# Patient Record
Sex: FEMALE | Race: WHITE | NOT HISPANIC OR LATINO | Employment: UNEMPLOYED | URBAN - METROPOLITAN AREA
[De-identification: names, ages, dates, MRNs, and addresses within clinical notes are randomized per-mention and may not be internally consistent; named-entity substitution may affect disease eponyms.]

---

## 2017-01-19 ENCOUNTER — GENERIC CONVERSION - ENCOUNTER (OUTPATIENT)
Dept: OTHER | Facility: OTHER | Age: 57
End: 2017-01-19

## 2017-02-03 ENCOUNTER — GENERIC CONVERSION - ENCOUNTER (OUTPATIENT)
Dept: OTHER | Facility: OTHER | Age: 57
End: 2017-02-03

## 2017-02-13 ENCOUNTER — GENERIC CONVERSION - ENCOUNTER (OUTPATIENT)
Dept: OTHER | Facility: OTHER | Age: 57
End: 2017-02-13

## 2017-03-05 ENCOUNTER — GENERIC CONVERSION - ENCOUNTER (OUTPATIENT)
Dept: OTHER | Facility: OTHER | Age: 57
End: 2017-03-05

## 2017-03-08 ENCOUNTER — GENERIC CONVERSION - ENCOUNTER (OUTPATIENT)
Dept: OTHER | Facility: OTHER | Age: 57
End: 2017-03-08

## 2017-04-19 ENCOUNTER — ALLSCRIPTS OFFICE VISIT (OUTPATIENT)
Dept: OTHER | Facility: OTHER | Age: 57
End: 2017-04-19

## 2017-04-19 DIAGNOSIS — Z12.31 ENCOUNTER FOR SCREENING MAMMOGRAM FOR MALIGNANT NEOPLASM OF BREAST: ICD-10-CM

## 2017-05-18 ENCOUNTER — GENERIC CONVERSION - ENCOUNTER (OUTPATIENT)
Dept: OTHER | Facility: OTHER | Age: 57
End: 2017-05-18

## 2017-09-05 ENCOUNTER — GENERIC CONVERSION - ENCOUNTER (OUTPATIENT)
Dept: OTHER | Facility: OTHER | Age: 57
End: 2017-09-05

## 2017-09-14 ENCOUNTER — GENERIC CONVERSION - ENCOUNTER (OUTPATIENT)
Dept: OTHER | Facility: OTHER | Age: 57
End: 2017-09-14

## 2017-09-20 ENCOUNTER — GENERIC CONVERSION - ENCOUNTER (OUTPATIENT)
Dept: OTHER | Facility: OTHER | Age: 57
End: 2017-09-20

## 2017-10-27 ENCOUNTER — ALLSCRIPTS OFFICE VISIT (OUTPATIENT)
Dept: OTHER | Facility: OTHER | Age: 57
End: 2017-10-27

## 2017-10-28 NOTE — PROGRESS NOTES
Assessment  1  Frequent headaches (784 0) (R51)    Plan  Frequent headaches    · Amoxicillin-Pot Clavulanate 875-125 MG Oral Tablet; TAKE 1 TABLET EVERY 12  HOURS DAILY    Discussion/Summary    Possible frontal sinusitis to call if not better after treatmentto the office as needed  Possible side effects of new medications were reviewed with the patient/guardian today  The treatment plan was reviewed with the patient/guardian  The patient/guardian understands and agrees with the treatment plan      Chief Complaint  Patient presents for c/o headache x 2 weeks and eye discomfort  nil/lpn      History of Present Illness  HPI: 55years old female seen for a frontal headache and pain behind both eyes for last 3 weeks, nasal congestion, started as upper respiratory infection, has been using Tylenol, Advil cold and Sinus was minimal help, no fever      Review of Systems    Constitutional: No fever, no chills, feels well, no tiredness, no recent weight gain or loss  ENT: no earache,-- no sore throat,-- no nasal discharge-- and-- no hoarseness  Respiratory: no complaints of shortness of breath, no wheezing, no dyspnea on exertion, no orthopnea or PND  Gastrointestinal: no complaints of abdominal pain, no constipation, no nausea or diarrhea, no vomiting, no bloody stools  Family History  Father    1  Family history of Warren Disease (V17 2)  Family History    2  Family history of Coronary Artery Disease (V17 49)    Social History   · Being A Social Drinker   · Daily Coffee Consumption (2  Cups/Day)   · Former smoker (V15 82) (L75 243)  The social history was reviewed and updated today  Surgical History  1  History of Diagnostic Esophagogastroduodenoscopy   2  History of Laparoscopic Sling Operation For Stress Incontinence    Current Meds   1  Actemra 200 MG/10ML Intravenous Solution; as per Rheumatologist;   Therapy: 47DEQ4263 to (Last Rx:20Nov2013) Ordered   2   BuPROPion HCl ER (XL) 300 MG Oral Tablet Extended Release 24 Hour; 1 EVERY   MORNING  Requested for: 14Zyk3167; Last Rx:25Wxu0603 Ordered   3  Diclofenac Sodium 75 MG Oral Tablet Delayed Release; Therapy: (Recorded:12Oct2012) to Recorded   4  Escitalopram Oxalate 20 MG Oral Tablet; One daily  Requested for: 27Oct2016; Last   Rx:63Rat7793 Ordered   5  Famciclovir 250 MG Oral Tablet; TAKE 1 TABLET BY MOUTH ONCE A DAY; Therapy: 52KKV5058 to (Sherry Cha)  Requested for: 69Mvz2217; Last   Rx:97Mta2055 Ordered   6  Folic Acid 1 MG Oral Tablet; Therapy: (Recorded:12Oct2012) to Recorded   7  Levothyroxine Sodium 125 MCG Oral Tablet; TAKE 1 TABLET BY MOUTH ONCE A DAY; Therapy: 59FMJ2949 to (Evaluate:04Ngn2059)  Requested for: 55YTS6136; Last   Rx:27Ibr5135 Ordered   8  Methotrexate 2 5 MG Oral Tablet; TAKE 4 TABLET WEEKLY; Therapy: (Recorded:78Wll4483) to Recorded   9  Plaquenil 200 MG Oral Tablet; Therapy: (Recorded:12Oct2012) to Recorded   10  PredniSONE 5 MG Oral Tablet; TAKE 1 TABLET AS DIRECTED; Therapy: (Recorded:26Scv1535) to Recorded   11  Simvastatin 10 MG Oral Tablet; TAKE 1 TABLET BY MOUTH ONCE A DAY; Therapy: 79GTX5438 to (Evaluate:02Iml0862)  Requested for: 33Xbq8059; Last    Rx:98Dvy5913; Status: ACTIVE - Transmit to [facility] Verification Ordered   12  Vitamin D3 2000 UNIT Oral Capsule; TAKE 1 CAPSULE BY MOUTH ONCE A DAY; Therapy: (Recorded:08Jun2015) to Recorded    The medication list was reviewed and updated today  Allergies  1  Sulfa Drugs   2  Valtrex TABS    Vitals   Recorded: 04XSJ1253 02:40PM   Temperature 98 F   Heart Rate 80   Respiration Quality Normal   Respiration 16   Systolic 124   Diastolic 90   Height 5 ft 0 25 in   Weight 115 lb 8 oz   BMI Calculated 22 37   BSA Calculated 1 48     Physical Exam    Constitutional   General appearance: No acute distress, well appearing and well nourished  Eyes   Conjunctiva and lids: No swelling, erythema or discharge      Ears, Nose, Mouth, and Throat Otoscopic examination: Tympanic membranes translucent with normal light reflex  Canals patent without erythema  Oropharynx: Normal with no erythema, edema, exudate or lesions  Pulmonary   Respiratory effort: No increased work of breathing or signs of respiratory distress  Auscultation of lungs: Clear to auscultation           Signatures   Electronically signed by : RANCHO Agosto ; Oct 27 2017  3:09PM EST                       (Author)

## 2017-11-02 ENCOUNTER — GENERIC CONVERSION - ENCOUNTER (OUTPATIENT)
Dept: OTHER | Facility: OTHER | Age: 57
End: 2017-11-02

## 2017-11-06 ENCOUNTER — APPOINTMENT (OUTPATIENT)
Dept: PHYSICAL THERAPY | Facility: CLINIC | Age: 57
End: 2017-11-06
Payer: COMMERCIAL

## 2017-11-06 PROCEDURE — G8978 MOBILITY CURRENT STATUS: HCPCS

## 2017-11-06 PROCEDURE — 97163 PT EVAL HIGH COMPLEX 45 MIN: CPT

## 2017-11-06 PROCEDURE — G8979 MOBILITY GOAL STATUS: HCPCS

## 2017-11-09 ENCOUNTER — APPOINTMENT (OUTPATIENT)
Dept: PHYSICAL THERAPY | Facility: CLINIC | Age: 57
End: 2017-11-09
Payer: COMMERCIAL

## 2017-11-13 ENCOUNTER — APPOINTMENT (OUTPATIENT)
Dept: PHYSICAL THERAPY | Facility: CLINIC | Age: 57
End: 2017-11-13
Payer: COMMERCIAL

## 2017-11-13 PROCEDURE — 97112 NEUROMUSCULAR REEDUCATION: CPT

## 2017-11-13 PROCEDURE — 97110 THERAPEUTIC EXERCISES: CPT

## 2017-11-15 ENCOUNTER — APPOINTMENT (OUTPATIENT)
Dept: PHYSICAL THERAPY | Facility: CLINIC | Age: 57
End: 2017-11-15
Payer: COMMERCIAL

## 2017-11-15 PROCEDURE — 97110 THERAPEUTIC EXERCISES: CPT

## 2017-11-15 PROCEDURE — 97112 NEUROMUSCULAR REEDUCATION: CPT

## 2017-11-20 ENCOUNTER — APPOINTMENT (OUTPATIENT)
Dept: PHYSICAL THERAPY | Facility: CLINIC | Age: 57
End: 2017-11-20
Payer: COMMERCIAL

## 2017-11-20 PROCEDURE — 97112 NEUROMUSCULAR REEDUCATION: CPT

## 2017-11-22 ENCOUNTER — APPOINTMENT (OUTPATIENT)
Dept: PHYSICAL THERAPY | Facility: CLINIC | Age: 57
End: 2017-11-22
Payer: COMMERCIAL

## 2017-11-22 PROCEDURE — 97112 NEUROMUSCULAR REEDUCATION: CPT

## 2017-11-22 PROCEDURE — 97110 THERAPEUTIC EXERCISES: CPT

## 2017-11-27 ENCOUNTER — APPOINTMENT (OUTPATIENT)
Dept: PHYSICAL THERAPY | Facility: CLINIC | Age: 57
End: 2017-11-27
Payer: COMMERCIAL

## 2017-11-27 PROCEDURE — 97110 THERAPEUTIC EXERCISES: CPT

## 2017-11-27 PROCEDURE — 97112 NEUROMUSCULAR REEDUCATION: CPT

## 2017-11-30 ENCOUNTER — APPOINTMENT (OUTPATIENT)
Dept: PHYSICAL THERAPY | Facility: CLINIC | Age: 57
End: 2017-11-30
Payer: COMMERCIAL

## 2017-11-30 PROCEDURE — 97112 NEUROMUSCULAR REEDUCATION: CPT

## 2017-11-30 PROCEDURE — 97110 THERAPEUTIC EXERCISES: CPT

## 2017-12-04 ENCOUNTER — APPOINTMENT (OUTPATIENT)
Dept: PHYSICAL THERAPY | Facility: CLINIC | Age: 57
End: 2017-12-04
Payer: COMMERCIAL

## 2017-12-04 PROCEDURE — 97112 NEUROMUSCULAR REEDUCATION: CPT

## 2017-12-04 PROCEDURE — G8979 MOBILITY GOAL STATUS: HCPCS

## 2017-12-04 PROCEDURE — G8980 MOBILITY D/C STATUS: HCPCS

## 2017-12-05 ENCOUNTER — GENERIC CONVERSION - ENCOUNTER (OUTPATIENT)
Dept: FAMILY MEDICINE CLINIC | Facility: CLINIC | Age: 57
End: 2017-12-05

## 2017-12-05 ENCOUNTER — GENERIC CONVERSION - ENCOUNTER (OUTPATIENT)
Dept: OTHER | Facility: OTHER | Age: 57
End: 2017-12-05

## 2017-12-07 ENCOUNTER — APPOINTMENT (OUTPATIENT)
Dept: PHYSICAL THERAPY | Facility: CLINIC | Age: 57
End: 2017-12-07
Payer: COMMERCIAL

## 2017-12-11 ENCOUNTER — APPOINTMENT (OUTPATIENT)
Dept: PHYSICAL THERAPY | Facility: CLINIC | Age: 57
End: 2017-12-11
Payer: COMMERCIAL

## 2017-12-14 ENCOUNTER — APPOINTMENT (OUTPATIENT)
Dept: PHYSICAL THERAPY | Facility: CLINIC | Age: 57
End: 2017-12-14
Payer: COMMERCIAL

## 2017-12-19 ENCOUNTER — APPOINTMENT (OUTPATIENT)
Dept: PHYSICAL THERAPY | Facility: CLINIC | Age: 57
End: 2017-12-19
Payer: COMMERCIAL

## 2017-12-21 ENCOUNTER — APPOINTMENT (OUTPATIENT)
Dept: PHYSICAL THERAPY | Facility: CLINIC | Age: 57
End: 2017-12-21
Payer: COMMERCIAL

## 2017-12-26 ENCOUNTER — APPOINTMENT (OUTPATIENT)
Dept: PHYSICAL THERAPY | Facility: CLINIC | Age: 57
End: 2017-12-26
Payer: COMMERCIAL

## 2017-12-28 ENCOUNTER — APPOINTMENT (OUTPATIENT)
Dept: PHYSICAL THERAPY | Facility: CLINIC | Age: 57
End: 2017-12-28
Payer: COMMERCIAL

## 2018-01-09 NOTE — RESULT NOTES
Verified Results  * XR FEMUR 2 VIEW RIGHT 19Tlz2033 11:43AM Urban Ivanoff Order Number: [id number]     Test Name Result Flag Reference   XR FEMUR 2 VW RIGHT (Report)     RIGHT FEMUR     INDICATION: Js Finner last week, mid thigh pain  COMPARISON: None     VIEWS: AP and lateral; 4 images     FINDINGS:     There is no acute fracture or dislocation  No degenerative changes  No lytic or blastic lesions are seen  Soft tissues are unremarkable  IMPRESSION:     No acute osseous abnormality         Workstation performed: [id number]     Signed by:   Disha Weston MD   2/29/16

## 2018-01-11 NOTE — MISCELLANEOUS
Message   Recorded as Task   Date: 02/29/2016 08:14 AM, Created By: Jennifer Craig   Task Name: Medical Complaint Callback   Assigned To: Uziel Del Angel   Regarding Patient: Sebastian Larkin, Status: Active   [facility] - 29 Feb 2016 8:14 AM     TASK CREATED  Caller: Self; [phone] (Home)  DR Federico Robles PT IS NOT GETTING ANY BETTER WHAT SHOULD SHE DO NEXT   [facility] - 29 Feb 2016 8:15 AM     TASK REASSIGNED: Previously Assigned To [address]   Patient continues to have right thigh pain  This started with her fall  She rested and used ice all weekend without improvement  We'll get x-ray of the femur and recommend physical therapy      Plan  Pain of right thigh    · * XR FEMUR 2 VIEW RIGHT; Status:Active;  Requested for:52Htk2106;    · Physical Therapy Referral Other Physician Referral  Consult  Status: Hold For -  Scheduling  Requested for: 44GIS6160  are Referring to a non- Preferred Provider : Established Patient  Care Summary provided  : Yes    Signatures   Electronically signed by : RANCHO Gallegos ; Feb 29 2016 10:12AM EST                       (Author)

## 2018-01-12 VITALS
SYSTOLIC BLOOD PRESSURE: 130 MMHG | DIASTOLIC BLOOD PRESSURE: 90 MMHG | HEART RATE: 80 BPM | BODY MASS INDEX: 22.68 KG/M2 | HEIGHT: 60 IN | RESPIRATION RATE: 16 BRPM | WEIGHT: 115.5 LBS | TEMPERATURE: 98 F

## 2018-01-12 VITALS
OXYGEN SATURATION: 98 % | WEIGHT: 118.4 LBS | TEMPERATURE: 98.5 F | HEART RATE: 78 BPM | DIASTOLIC BLOOD PRESSURE: 80 MMHG | BODY MASS INDEX: 23.25 KG/M2 | HEIGHT: 60 IN | RESPIRATION RATE: 16 BRPM | SYSTOLIC BLOOD PRESSURE: 100 MMHG

## 2018-01-12 NOTE — RESULT NOTES
Verified Results  ([facility]) Pap IG, rfx HPV ASCU 84OLI7401 12:00AM Bill Julio     Test Name Result Flag Reference   DIAGNOSIS: Comment     NEGATIVE FOR INTRAEPITHELIAL LESION AND MALIGNANCY  THE CYTOLOGY PROCESSING WAS PERFORMED AT THE [facility] LOCATED AT  [facility], [address] [place] [address], [address] [place] [id number]  Specimen adequacy: Comment     Satisfactory for evaluation  Endocervical and/or squamous metaplastic  cells (endocervical component) are present  Clinician provided ICD10: Comment     Z01 419   Performed by: Sai Garcia, Cytotechnologist (ASCP)     Carlos Angela Note: Comment     The Pap smear is a screening test designed to aid in the detection of  premalignant and malignant conditions of the uterine cervix  It is not a  diagnostic procedure and should not be used as the sole means of detecting  cervical cancer  Both false-positive and false-negative reports do occur    Comment     The HPV DNA reflex criteria were not met with this specimen result  therefore, no HPV testing was performed         Discussion/Summary   normal PAP test - Dr LEZAMA

## 2018-01-14 NOTE — PROGRESS NOTES
Assessment    1  Annual physical exam (V70 0) (Z00 00)   2  Encounter for gynecological examination with Papanicolaou smear of cervix   (V72 31,V76 2) (D02 234,B54  4)    Plan  Encounter for gynecological examination with Papanicolaou smear of cervix    · (1) THIN PREP PAP FOLLOW UP WITH IMAGING; Status:Active; Requested  IEM:98WUZ5442;   Maturation index required? : No  HPV? : if ASCUS  Herpes simplex infection    · Famciclovir 250 MG Oral Tablet; 1 tab PO daily  Hypercholesterolemia, Hypothyroidism    · (1) COMPREHENSIVE METABOLIC PANEL; Status:Active; Requested TVI:11JFL1985;    · (1) LIPID PANEL, FASTING; Status:Active; Requested SFB:50QAD5433;    · (1) TSH; Status:Active; Requested FXE:43HUN0670;     Discussion/Summary  health maintenance visit healthy adult female Currently, she eats an adequate diet and has an inadequate exercise regimen  Pap test was done today cervical cancer screening is needed every three years Breast cancer screening: the risks and benefits of breast cancer screening were discussed and mammogram is current  Colorectal cancer screening: colonoscopy has been ordered  Patient discussion: discussed with the patient  Rto in 1 y  Chief Complaint  Annual PE  kss,cma      History of Present Illness  HM, Adult Female: The patient is being seen for a health maintenance and gynecology evaluation  The last health maintenance visit was 1 year(s) ago  General Health: The patient's health since the last visit is described as good  She has regular dental visits  She complains of vision problems  Vision care includes wearing glasses and having regular eye examinations  She denies hearing loss  Immunizations status: up to date  Lifestyle:  She consumes a diverse and healthy diet  She does not have any weight concerns  She does not exercise regularly  She does not use tobacco  She denies alcohol use  She denies drug use  Reproductive health: the patient is postmenopausal   LMP 8 y ago  Screening:      Review of Systems    Constitutional: No fever, no chills, feels well, no tiredness, no recent weight gain or weight loss  Eyes: No complaints of eye pain, no red eyes, no eyesight problems, no discharge, no dry eyes, no itching of eyes  ENT: no complaints of earache, no loss of hearing, no nose bleeds, no nasal discharge, no sore throat, no hoarseness  Cardiovascular: No complaints of slow heart rate, no fast heart rate, no chest pain, no palpitations, no leg claudication, no lower extremity edema  Respiratory: No complaints of shortness of breath, no wheezing, no cough, no SOB on exertion, no orthopnea, no PND  Gastrointestinal: No complaints of abdominal pain, no constipation, no nausea or vomiting, no diarrhea, no bloody stools  Genitourinary: No complaints of dysuria, no incontinence, no pelvic pain, no dysmenorrhea, no vaginal discharge or bleeding  Musculoskeletal: No complaints of arthralgias, no myalgias, no joint swelling or stiffness, no limb pain or swelling  Integumentary: No complaints of skin rash or lesions, no itching, no skin wounds, no breast pain or lump  Neurological: No complaints of headache, no confusion, no convulsions, no numbness, no dizziness or fainting, no tingling, no limb weakness, no difficulty walking  Psychiatric: Not suicidal, no sleep disturbance, no anxiety or depression, no change in personality, no emotional problems  Endocrine: No complaints of proptosis, no hot flashes, no muscle weakness, no deepening of the voice, no feelings of weakness  Hematologic/Lymphatic: No complaints of swollen glands, no swollen glands in the neck, does not bleed easily, does not bruise easily  Active Problems    1  Annual physical exam (V70 0) (Z00 00)   2  Depression with anxiety (300 4) (F41 8)   3  Encounter for routine gynecological examination (V72 31) (Z01 419)   4  Encounter for screening for malignant neoplasm of colon (V76 51) (Z12 11)   5  Encounter for screening for malignant neoplasm of colon (V76 51) (Z12 11)   6  Encounter for screening mammogram for malignant neoplasm of breast (V76 12)   (Z12 31)   7  Herpes gingivostomatitis (054 2) (B00 2)   8  High risk for fracture due to osteoporosis by DEXA scan (733 00) (M81 0)   9  [place] disease (333 4) (G10)   10  Hypercholesterolemia (272 0) (E78 0)   11  Hypothyroidism (244 9) (E03 9)   12  Need for prophylactic vaccination and inoculation against influenza (V04 81) (Z23)   13  Sjogren's syndrome (710 2) (M35 00)   14  Sleep disorder (780 50) (G47 9)   15  Vitamin D deficiency (268 9) (E55 9)    Past Medical History    · History of Benign Polyps Of The Large Intestine (V12 72)    Surgical History    · History of Diagnostic Esophagogastroduodenoscopy   · History of Laparoscopic Sling Operation For Stress Incontinence    Family History  Father    · Family history of [place] Disease (V17 2)  Family History    · Family history of Coronary Artery Disease (V17 49)    Social History    · Being A Social Drinker   · Daily Coffee Consumption (2  Cups/Day)   · Former smoker (V15 82) (Q23 087)    Current Meds   1  Actemra 200 MG/10ML Intravenous Solution; as per Rheumatologist;   Therapy: 09PNH7295 to (Last Rx:20Nov2013) Ordered   2  BuPROPion HCl ER (XL) 300 MG Oral Tablet Extended Release 24 Hour; 1 EVERY   MORNING  Requested for: 03MVL3646; Last Rx:96Ege8743 Ordered   3  Diclofenac Sodium 75 MG Oral Tablet Delayed Release; Therapy: (Recorded:12Oct2012) to Recorded   4  Escitalopram Oxalate 20 MG Oral Tablet; One daily  Requested for: 41Sbf4454; Last   Rx:13Yhz9065 Ordered   5  Fexofenadine-Pseudoephed -240 MG Oral Tablet Extended Release 24 Hour;   TAKE 1 TABLET DAILY; Therapy: 51UAQ7228 to (Evaluate:43Vnm5194)  Requested for: 97RVT9482; Last   Rx:13Mar2015 Ordered   6  Folic Acid 1 MG Oral Tablet; Therapy: (Recorded:12Oct2012) to Recorded   7   Methotrexate 2 5 MG Oral Tablet; TAKE 1 TABLET WEEKLY; Therapy: (Recorded:12Oct2012) to Recorded   8  Nasonex 50 MCG/ACT Nasal Suspension; 2 PUFFS EACH NOSTRIL DAILY; Therapy: 21UHS3868 to (Last Rx:13Mar2015)  Requested for: 51QND7638 Ordered   9  Plaquenil 200 MG Oral Tablet; Therapy: (Recorded:12Oct2012) to Recorded   10  PredniSONE 5 MG Oral Tablet; TAKE 1 TABLET AS DIRECTED; Therapy: (Recorded:12Oct2012) to Recorded   11  Simvastatin 10 MG Oral Tablet; take 1 tablet by mouth every day; Therapy: 39XPX4043 to (Evaluate:16Oct2016)  Requested for: 20Jan2016; Last    Rx:20Jan2016 Ordered   12  Synthroid 125 MCG Oral Tablet; TAKE 1 TABLET BY MOUTH ONCE A DAY; Therapy: 51Cnh6672 to (Last Rx:68Qtp1185)  Requested for: 92Mev8574 Ordered   13  Vitamin D3 2000 UNIT Oral Capsule; TAKE 1 CAPSULE BY MOUTH ONCE A DAY; Therapy: (Recorded:08Jun2015) to Recorded    Allergies    1  Sulfa Drugs   2  Valtrex TABS    Vitals   Recorded: 31CIT3280 33:39SO   Systolic 564   Diastolic 82   Heart Rate 84   Respiration 16   Temperature 98 F   Height 5 ft 0 25 in   Weight 115 lb 4 oz   BMI Calculated 22 32   BSA Calculated 1 48     Physical Exam    Constitutional   General appearance: No acute distress, well appearing and well nourished  Head and Face   Head and face: Normal     Eyes   Conjunctiva and lids: No swelling, erythema or discharge  Ears, Nose, Mouth, and Throat   Hearing: Normal     Oropharynx: Normal with no erythema, edema, exudate or lesions  Neck   Neck: Supple, symmetric, trachea midline, no masses  Thyroid: Normal, no thyromegaly  Pulmonary   Respiratory effort: No increased work of breathing or signs of respiratory distress  Auscultation of lungs: Clear to auscultation  Cardiovascular   Auscultation of heart: Normal rate and rhythm, normal S1 and S2, no murmurs  Examination of extremities for edema and/or varicosities: Normal     Chest   Breasts: Normal, no dimpling or skin changes appreciated      Palpation of breasts and axillae: Normal, no masses palpated  Abdomen   Abdomen: Non-tender, no masses  Genitourinary   External genitalia and vagina: Normal, no lesions appreciated  + atrophy  Cervix: Normal, no lesions  Uterus: Normal size, no tenderness, no masses  Adnexa/Parametria: Normal, no masses or tenderness  Lymphatic   Palpation of lymph nodes in neck: No lymphadenopathy  Palpation of lymph nodes in axillae: No lymphadenopathy  Musculoskeletal   Gait and station: Normal     Joints, bones, and muscles: Normal     Muscle strength/tone: Normal     Skin   Skin and subcutaneous tissue: Normal without rashes or lesions  Neurologic   Cranial nerves: Cranial nerves II-XII intact  Coordination: Normal finger to nose and heel to shin  Psychiatric   Judgment and insight: Normal     Mood and affect: Normal        Health Management  Encounter for screening mammogram for malignant neoplasm of breast   Digital Bilateral Screening Mammogram With CAD; every 1 year; Last 10JLL3206; Next  Due: 96Nis4765; Overdue  History of Need for prophylactic vaccination and inoculation against influenza   Influenza; every 1 year; Next Due: 18Vbm4752;  Overdue    Signatures   Electronically signed by : RANCHO Villalobos ; Sep 27 2016  1:49PM EST                       (Author)

## 2018-01-15 NOTE — RESULT NOTES
Verified Results  * MAMMO SCREENING BILATERAL W CAD 42Jtb1994 08:42AM Radha Bautista Order Number: [id number]     Test Name Result Flag Reference   MAMMO SCREENING BILATERAL W CAD (Report)     Patient History:   Patient is postmenopausal    Took hormonal contraceptives for 5 years beginning at age 22  Patient's BMI is 22 6  Reason for exam: screening (asymptomatic)  Mammo Screening Bilateral W CAD: July 12, 2016 - Check In #:    [de-identified]   Bilateral CC and MLO view(s) were taken  Technologist: RT Raul(R)(M)   Prior study comparison: February 3, 2015, bilateral screening    mammogram performed at [address]  August 23, 2012, bilateral screening mammogram performed at [address]  The breast tissue is almost entirely fat  No new dominant soft    tissue mass, architectural distortion or suspicious    calcifications are noted  The skin and nipple structures are    within normal limits  No mammographic evidence of malignancy  No    significant changes when compared with prior studies  ASSESSMENT: BiRad:2 - Benign     Recommendation:   Routine screening mammogram of both breasts in 1 year  Analyzed by CAD     8-10% of cancers will be missed on mammography  Management of a    palpable abnormality must be based on clinical grounds  Patients   will be notified of their results via letter from our facility  Accredited by [employer] of [employer] and FDA  Transcription Location: [facility] 98: [id number]     Risk Value(s):   Tyrer-Cuzick 10 Year: 1 998%, Tyrer-Cuzick Lifetime: 7 144%,    Myriad Table: 1 5%, ISRAEL 5 Year: 1 6%, NCI Lifetime: 11 4%   Signed by:   Romain Burnham MD   7/12/16       Discussion/Summary   normal mammogram  - DR LEZAMA

## 2018-01-16 NOTE — RESULT NOTES
Verified Results  (1) COMPREHENSIVE METABOLIC PANEL [id number] 65:22US Timmothy Opitz     Test Name Result Flag Reference   Glucose, Serum 93 mg/dL  65-99   BUN 9 mg/dL  6-24   Creatinine, Serum 0 76 mg/dL  0 57-1 00   eGFR If NonAfricn Am 89 mL/min/1 73  >59   eGFR If Africn Am 103 mL/min/1 73  >59   BUN/Creatinine Ratio 12  9-23   Sodium, Serum 139 mmol/L  134-144   Potassium, Serum 4 8 mmol/L  3 5-5 2   Chloride, Serum 100 mmol/L     Carbon Dioxide, Total 25 mmol/L  18-29   Calcium, Serum 9 5 mg/dL  8 7-10 2   Protein, Total, Serum 6 6 g/dL  6 0-8 5   Albumin, Serum 4 5 g/dL  3 5-5 5   Globulin, Total 2 1 g/dL  1 5-4 5   A/G Ratio 2 1  1 1-2 5   Bilirubin, Total 0 3 mg/dL  0 0-1 2   Alkaline Phosphatase, S 58 IU/L     AST (SGOT) 15 IU/L  0-40   ALT (SGPT) 14 IU/L  0-32     (1) LIPID PANEL, FASTING [id number] 12:51PM Timmothy Opitz     Test Name Result Flag Reference   Cholesterol, Total 230 mg/dL H 100-199   Triglycerides 148 mg/dL  0-149   HDL Cholesterol 77 mg/dL  >39   According to ATP-III Guidelines, HDL-C >59 mg/dL is considered a  negative risk factor for CHD  VLDL Cholesterol Rene 30 mg/dL  5-40   LDL Cholesterol Calc 123 mg/dL H 0-99   T  Chol/HDL Ratio 3 0 ratio units  0 0-4 4   T  Chol/HDL Ratio                                                             Men  Women                                               1/2 Avg  Risk  3 4    3 3                                                   Avg Risk  5 0    4 4                                                2X Avg  Risk  9 6    7 1                                                3X Avg  Risk 23 4   11 0     (1) TSH [id number] 12:51PM Timmothy Opitz     Test Name Result Flag Reference   TSH 0 701 uIU/mL  0 450-4 500     [facility]) Cardiovascular Risk Assessment [id number] 12:51PM Timmothy Opitz     Test Name Result Flag Reference   Interpretation Note     -------------------------------  CARDIOVASCULAR REPORT:  -------------------------------  Current available clinical information suggests the  patient's risk is at least LOW  If the patient has two or  more major risk factors, the risk category is intermediate  If the patient has CHD or a CHD risk equivalent, the risk  category is high  If patient does not have CHD or a CHD risk  equivalent, consider use of the Pooled Cohort Equations to  estimate 10-year CVD risk, as individuals with greater than  7 5% risk may warrant more intensive therapy  The calculator  can be found at:  http://tools  cardiosource org/HGSRP-Kbvl-Akptgvztn/  -  Insulin resistance, obesity, excessive alcohol use, smoking,  nephrotic syndrome, liver disease, and certain medications  can cause secondary dyslipidemia  Consider evaluation if  clinically indicated  -  Therapeutic lifestyle changes are always valuable to achieve  optimal blood lipid status (diet, exercise, weight  management)  -------------------------------  LIPID MANAGEMENT  Select one patient risk category based upon medical history  and clinical judgment  Additional risk factors such as  personal or family history of premature CHD, smoking, and  hypertension modify a patient's goals of therapy  In CVD  prevention, the intensity of therapy should be adjusted to  the level of patient risk  MODERATE intensity statin therapy  generally results in an average LDL-C reduction of 30% to  less than 50% from the untreated baseline  Examples include  (daily doses): atorvastatin 10-20 mg, rosuvastatin 5-10 mg,  simvastatin 20-40 mg, pravastatin 40-80 mg, lovastatin 40  mg  HIGH intensity statin therapy generally results in an  average LDL-C reduction of 50% or more from the untreated  baseline  Examples include (daily doses): atorvastatin 40-80  mg and rosuvastatin 20 mg   -------------------------------  LOW RISK ASSESSMENT AND TREATMENT SUGGESTIONS  -------------------------------  LDL-C is acceptable, was 107 and now is 123 mg/dL  Non-HDL  Cholesterol is acceptable, was 121 and now is 153 mg/dL  -  Considerations for use of statin therapy include family  history of premature atherosclerotic disease, elevated  coronary artery calcium score, ankle-brachial index < 0 9,  elevated CRP, or elevated 10-year or lifetime CVD risk   -------------------------------  INTERMEDIATE RISK ASSESSMENT AND TREATMENT SUGGESTIONS  -------------------------------  LDL-C is acceptable, was 107 and now is 123 mg/dL  Non-HDL  Cholesterol is acceptable, was 121 and now is 153 mg/dL  -  Consider measurement of LDL particle number or Apo B to  adjudicate need for further LDL lowering therapy  Consider  beginning or increasing statin  Factors that may influence  statin use include family history of premature  atherosclerotic disease, elevated coronary artery calcium  score, ankle-brachial index < 0 9, elevated CRP, or elevated  10-year or lifetime CVD risk  If statin cannot be tolerated  or increased, alternatives include use of an intestinal  agent (ezetimibe or bile acid sequestrant) or niacin   -------------------------------  HIGH RISK ASSESSMENT AND TREATMENT SUGGESTIONS  -------------------------------  LDL-C is borderline high, was 107 and now is 123 mg/dL  Non-HDL Cholesterol is borderline high, was 121 and now is  153 mg/dL  -  Begin statin  If statin already in use, consider increasing  dose to achieve at least a 50% LDL reduction from baseline  Moderate or high intensity statin is preferred  If statin  cannot be tolerated or increased, alternatives include use  of an intestinal agent (ezetimibe or bile acid sequestrant)  or niacin   -------------------------------  DISCLAIMER  These assessments and treatment suggestions are provided as  a convenience in support of the physician-patient  relationship and are not intended to replace the physician's  clinical judgment   They are derived from the national  guidelines in addition to other evidence and expert opinion  The clinician should consider this information within the  context of clinical opinion and the individual patient  SEE GUIDANCE FOR CARDIOVASCULAR REPORT: National Heart,  Lung, and Blood Smiley's Third Report of the NCEP Expert  Panel on Detection, Evaluation and Treatment of High Blood  Cholesterol in Adults (ATP III) (2002  NIH publication  ); Collins et al  Diabetes Care 2008; 31(4):811-82;  Lincoln et al  Clin Chem 2009; 55(3):407-419; Pam Mcfarland et  al  2013 ACC/AHA guideline on the treatment of blood  cholesterol to reduce atherosclerotic cardiovascular risk in  adults: a report of the [employer] of  Cardiology/American Heart Association Task Force on Practice  Guidelines  Circulation 3473;921(ANAMARIA 2):S1? S45  PDF Image            Discussion/Summary   all labs are good except for worsening Lipids frm last time - LDL is still OK - pl, cont with Simvastatin and follow low cholestero diet - Dr LEZAMA

## 2018-02-01 ENCOUNTER — CLINICAL SUPPORT (OUTPATIENT)
Dept: FAMILY MEDICINE CLINIC | Facility: CLINIC | Age: 58
End: 2018-02-01
Payer: COMMERCIAL

## 2018-02-01 DIAGNOSIS — Z23 NEED FOR INFLUENZA VACCINATION: Primary | ICD-10-CM

## 2018-02-01 PROCEDURE — 99999 PR OFFICE/OUTPT VISIT,PROCEDURE ONLY: CPT

## 2018-02-01 PROCEDURE — 90688 IIV4 VACCINE SPLT 0.5 ML IM: CPT | Performed by: FAMILY MEDICINE

## 2018-02-01 PROCEDURE — 90472 IMMUNIZATION ADMIN EACH ADD: CPT

## 2018-02-01 PROCEDURE — 90746 HEPB VACCINE 3 DOSE ADULT IM: CPT

## 2018-02-01 PROCEDURE — 99999 PR OFFICE/OUTPT VISIT,PROCEDURE ONLY: CPT | Performed by: FAMILY MEDICINE

## 2018-02-08 ENCOUNTER — TELEPHONE (OUTPATIENT)
Dept: FAMILY MEDICINE CLINIC | Facility: CLINIC | Age: 58
End: 2018-02-08

## 2018-02-14 ENCOUNTER — OFFICE VISIT (OUTPATIENT)
Dept: FAMILY MEDICINE CLINIC | Facility: CLINIC | Age: 58
End: 2018-02-14
Payer: COMMERCIAL

## 2018-02-14 VITALS
TEMPERATURE: 97.9 F | BODY MASS INDEX: 21.6 KG/M2 | DIASTOLIC BLOOD PRESSURE: 80 MMHG | SYSTOLIC BLOOD PRESSURE: 118 MMHG | RESPIRATION RATE: 16 BRPM | HEART RATE: 82 BPM | WEIGHT: 110 LBS | HEIGHT: 60 IN

## 2018-02-14 DIAGNOSIS — G10 HUNTINGTON DISEASE (HCC): ICD-10-CM

## 2018-02-14 DIAGNOSIS — Z12.39 BREAST CANCER SCREENING: ICD-10-CM

## 2018-02-14 DIAGNOSIS — E78.00 HYPERCHOLESTEROLEMIA: ICD-10-CM

## 2018-02-14 DIAGNOSIS — Z12.11 SCREENING FOR COLON CANCER: ICD-10-CM

## 2018-02-14 DIAGNOSIS — E78.5 HYPERLIPIDEMIA, UNSPECIFIED HYPERLIPIDEMIA TYPE: ICD-10-CM

## 2018-02-14 DIAGNOSIS — E03.9 HYPOTHYROIDISM, UNSPECIFIED TYPE: Primary | ICD-10-CM

## 2018-02-14 PROBLEM — G89.4 CHRONIC PAIN SYNDROME: Status: ACTIVE | Noted: 2018-02-14

## 2018-02-14 PROCEDURE — 3008F BODY MASS INDEX DOCD: CPT | Performed by: FAMILY MEDICINE

## 2018-02-14 PROCEDURE — 3725F SCREEN DEPRESSION PERFORMED: CPT | Performed by: FAMILY MEDICINE

## 2018-02-14 PROCEDURE — 99214 OFFICE O/P EST MOD 30 MIN: CPT | Performed by: FAMILY MEDICINE

## 2018-02-14 RX ORDER — SIMVASTATIN 10 MG
1 TABLET ORAL DAILY
COMMUNITY
Start: 2014-03-25 | End: 2018-02-14 | Stop reason: SDUPTHER

## 2018-02-14 RX ORDER — ESCITALOPRAM OXALATE 20 MG/1
1 TABLET ORAL DAILY
COMMUNITY
Start: 2017-11-21 | End: 2018-12-05 | Stop reason: SDUPTHER

## 2018-02-14 RX ORDER — SIMVASTATIN 10 MG
TABLET ORAL
Qty: 30 TABLET | Refills: 0 | Status: SHIPPED | OUTPATIENT
Start: 2018-02-14 | End: 2018-05-10 | Stop reason: SDUPTHER

## 2018-02-14 RX ORDER — LEVOTHYROXINE SODIUM 0.12 MG/1
1 TABLET ORAL DAILY
COMMUNITY
Start: 2017-04-09 | End: 2018-02-14 | Stop reason: SDUPTHER

## 2018-02-14 RX ORDER — BUPROPION HYDROCHLORIDE 300 MG/1
TABLET ORAL
COMMUNITY
End: 2018-04-21 | Stop reason: SDUPTHER

## 2018-02-14 RX ORDER — FAMCICLOVIR 250 MG/1
1 TABLET, FILM COATED ORAL DAILY
COMMUNITY
Start: 2016-09-27 | End: 2018-06-15 | Stop reason: SDUPTHER

## 2018-02-14 RX ORDER — HYDROXYCHLOROQUINE SULFATE 200 MG/1
200 TABLET, FILM COATED ORAL
COMMUNITY
End: 2021-10-27 | Stop reason: SDUPTHER

## 2018-02-14 RX ORDER — DICLOFENAC SODIUM 75 MG/1
75 TABLET, DELAYED RELEASE ORAL 2 TIMES DAILY
COMMUNITY
End: 2021-06-10

## 2018-02-14 RX ORDER — LEVOTHYROXINE SODIUM 0.12 MG/1
TABLET ORAL
Qty: 30 TABLET | Refills: 2 | Status: SHIPPED | OUTPATIENT
Start: 2018-02-14 | End: 2018-06-14 | Stop reason: SDUPTHER

## 2018-02-14 NOTE — PROGRESS NOTES
Chief Complaint   Patient presents with    Follow-up    Hyperlipidemia    Hypothyroidism        Patient ID: Lm Marc is a 65 y o  female  Pt seen and examined  Here because she lost her orders and needs new ones  Need referral for c-scope-- sees Dr Jayshree Cordova rheum yesterday-- needs EMG for carpal tunnel  Sees neuro for Anne Marie--feels quite fatigued     Sees ortho          The following portions of the patient's history were reviewed and updated as appropriate: allergies, current medications, past family history, past medical history, past social history, past surgical history and problem list     Review of Systems   Constitutional: Positive for activity change and fatigue  Negative for appetite change  Respiratory: Negative for cough and chest tightness  Cardiovascular: Negative for chest pain, palpitations and leg swelling  Gastrointestinal: Negative for abdominal pain, constipation, diarrhea, nausea and vomiting  Genitourinary: Negative for difficulty urinating  Musculoskeletal: Positive for arthralgias, gait problem and myalgias  Neurological: Negative for dizziness, weakness and headaches  Hematological: Negative for adenopathy  Psychiatric/Behavioral: Negative for behavioral problems  The patient is not nervous/anxious            Current Outpatient Prescriptions   Medication Sig Dispense Refill    buPROPion (WELLBUTRIN XL) 300 mg 24 hr tablet Take by mouth      diclofenac (VOLTAREN) 75 mg EC tablet Take by mouth      escitalopram (LEXAPRO) 20 mg tablet Take 1 tablet by mouth daily      famciclovir (FAMVIR) 250 MG tablet Take 1 tablet by mouth daily      hydroxychloroquine (PLAQUENIL) 200 mg tablet Take by mouth      levothyroxine 125 mcg tablet Take 1 tablet by mouth daily      simvastatin (ZOCOR) 10 mg tablet Take 1 tablet by mouth daily      tocilizumab (ACTEMRA) 200 mg/10 mL Infuse into a venous catheter       No current facility-administered medications for this visit  Objective:    /80 (BP Location: Left arm, Patient Position: Sitting, Cuff Size: Standard)   Pulse 82   Temp 97 9 °F (36 6 °C)   Resp 16   Ht 5' 0 25" (1 53 m)   Wt 49 9 kg (110 lb)   BMI 21 30 kg/m²        Physical Exam   Constitutional: She is oriented to person, place, and time  thin   Eyes: Conjunctivae are normal    Neck: Normal range of motion  No thyromegaly present  Cardiovascular: Normal rate, regular rhythm, normal heart sounds and intact distal pulses  Pulmonary/Chest: Effort normal and breath sounds normal    Abdominal: Soft  Musculoskeletal: She exhibits no edema  Neurological: She is alert and oriented to person, place, and time  Psychiatric: She has a normal mood and affect  Assessment/Plan:        Diagnoses and all orders for this visit:    Hypothyroidism, unspecified type  -     TSH, 3rd generation with T4 reflex; Future  -     TSH, 3rd generation with T4 reflex    Hypercholesterolemia  -     Lipid panel; Future  -     Lipid panel    Anne Marie disease (HCC)  -     CBC and differential; Future  -     Comprehensive metabolic panel; Future  -     Vitamin D 25 hydroxy; Future  -     CBC and differential  -     Comprehensive metabolic panel  -     Vitamin D 25 hydroxy    Breast cancer screening  -     Mammo screening bilateral w cad; Future    Screening for colon cancer  -     Ambulatory referral to Gastroenterology;  Future                  Return for Annual physical           Duong Henderson DO

## 2018-04-21 DIAGNOSIS — F32.A DEPRESSION, UNSPECIFIED DEPRESSION TYPE: Primary | ICD-10-CM

## 2018-04-23 RX ORDER — BUPROPION HYDROCHLORIDE 300 MG/1
TABLET ORAL
Qty: 30 TABLET | Refills: 3 | Status: SHIPPED | OUTPATIENT
Start: 2018-04-23 | End: 2018-11-01 | Stop reason: SDUPTHER

## 2018-04-24 ENCOUNTER — TELEPHONE (OUTPATIENT)
Dept: FAMILY MEDICINE CLINIC | Facility: CLINIC | Age: 58
End: 2018-04-24

## 2018-04-24 NOTE — TELEPHONE ENCOUNTER
Dr Bassem Palma,    Patient would like a recommendation of a Neuro doctor that you would recommend for her to see? Please call patient back and advise

## 2018-05-09 LAB
25(OH)D3+25(OH)D2 SERPL-MCNC: 23.8 NG/ML (ref 30–100)
ALBUMIN SERPL-MCNC: 4.2 G/DL (ref 3.5–5.5)
ALBUMIN/GLOB SERPL: 2 {RATIO} (ref 1.2–2.2)
ALP SERPL-CCNC: 67 IU/L (ref 39–117)
ALT SERPL-CCNC: 15 IU/L (ref 0–32)
AST SERPL-CCNC: 19 IU/L (ref 0–40)
BASOPHILS # BLD AUTO: 0 X10E3/UL (ref 0–0.2)
BASOPHILS NFR BLD AUTO: 0 %
BILIRUB SERPL-MCNC: 0.4 MG/DL (ref 0–1.2)
BUN SERPL-MCNC: 8 MG/DL (ref 6–24)
BUN/CREAT SERPL: 9 (ref 9–23)
CALCIUM SERPL-MCNC: 9.4 MG/DL (ref 8.7–10.2)
CHLORIDE SERPL-SCNC: 98 MMOL/L (ref 96–106)
CHOLEST SERPL-MCNC: 245 MG/DL (ref 100–199)
CHOLEST/HDLC SERPL: 3 RATIO (ref 0–4.4)
CO2 SERPL-SCNC: 24 MMOL/L (ref 18–29)
CREAT SERPL-MCNC: 0.89 MG/DL (ref 0.57–1)
EOSINOPHIL # BLD AUTO: 0.1 X10E3/UL (ref 0–0.4)
EOSINOPHIL NFR BLD AUTO: 3 %
ERYTHROCYTE [DISTWIDTH] IN BLOOD BY AUTOMATED COUNT: 13.1 % (ref 12.3–15.4)
GLOBULIN SER-MCNC: 2.1 G/DL (ref 1.5–4.5)
GLUCOSE SERPL-MCNC: 88 MG/DL (ref 65–99)
HCT VFR BLD AUTO: 41.1 % (ref 34–46.6)
HDLC SERPL-MCNC: 83 MG/DL
HGB BLD-MCNC: 13.9 G/DL (ref 11.1–15.9)
IMM GRANULOCYTES # BLD: 0 X10E3/UL (ref 0–0.1)
IMM GRANULOCYTES NFR BLD: 0 %
LDLC SERPL CALC-MCNC: 144 MG/DL (ref 0–99)
LYMPHOCYTES # BLD AUTO: 1.1 X10E3/UL (ref 0.7–3.1)
LYMPHOCYTES NFR BLD AUTO: 33 %
MCH RBC QN AUTO: 30.5 PG (ref 26.6–33)
MCHC RBC AUTO-ENTMCNC: 33.8 G/DL (ref 31.5–35.7)
MCV RBC AUTO: 90 FL (ref 79–97)
MICRODELETION SYND BLD/T FISH: NORMAL
MONOCYTES # BLD AUTO: 0.2 X10E3/UL (ref 0.1–0.9)
MONOCYTES NFR BLD AUTO: 6 %
NEUTROPHILS # BLD AUTO: 1.9 X10E3/UL (ref 1.4–7)
NEUTROPHILS NFR BLD AUTO: 58 %
PLATELET # BLD AUTO: 198 X10E3/UL (ref 150–379)
POTASSIUM SERPL-SCNC: 4.1 MMOL/L (ref 3.5–5.2)
PROT SERPL-MCNC: 6.3 G/DL (ref 6–8.5)
RBC # BLD AUTO: 4.56 X10E6/UL (ref 3.77–5.28)
SL AMB EGFR AFRICAN AMERICAN: 84 ML/MIN/1.73
SL AMB EGFR NON AFRICAN AMERICAN: 73 ML/MIN/1.73
SL AMB VLDL CHOLESTEROL CALC: 18 MG/DL (ref 5–40)
SODIUM SERPL-SCNC: 139 MMOL/L (ref 134–144)
TRIGL SERPL-MCNC: 92 MG/DL (ref 0–149)
TSH SERPL DL<=0.005 MIU/L-ACNC: 1.2 UIU/ML (ref 0.45–4.5)
WBC # BLD AUTO: 3.4 X10E3/UL (ref 3.4–10.8)

## 2018-05-10 DIAGNOSIS — E78.5 HYPERLIPIDEMIA, UNSPECIFIED HYPERLIPIDEMIA TYPE: ICD-10-CM

## 2018-05-10 RX ORDER — SIMVASTATIN 10 MG
TABLET ORAL
Qty: 30 TABLET | Refills: 0 | Status: SHIPPED | OUTPATIENT
Start: 2018-05-10 | End: 2018-06-17 | Stop reason: SDUPTHER

## 2018-05-11 ENCOUNTER — TELEPHONE (OUTPATIENT)
Dept: FAMILY MEDICINE CLINIC | Facility: CLINIC | Age: 58
End: 2018-05-11

## 2018-05-11 NOTE — TELEPHONE ENCOUNTER
----- Message from Ashley Gray DO sent at 5/11/2018 12:17 PM EDT -----  Lipid panel is elevated  Continue heart healthy diet     Will recheck in 6 months

## 2018-06-14 DIAGNOSIS — E03.9 HYPOTHYROIDISM, UNSPECIFIED TYPE: ICD-10-CM

## 2018-06-15 DIAGNOSIS — B00.9 HERPES: Primary | ICD-10-CM

## 2018-06-15 RX ORDER — FAMCICLOVIR 250 MG/1
250 TABLET, FILM COATED ORAL DAILY
Qty: 30 TABLET | Refills: 0 | Status: SHIPPED | OUTPATIENT
Start: 2018-06-15 | End: 2018-10-02 | Stop reason: SDUPTHER

## 2018-06-15 RX ORDER — LEVOTHYROXINE SODIUM 0.12 MG/1
TABLET ORAL
Qty: 30 TABLET | Refills: 1 | Status: SHIPPED | OUTPATIENT
Start: 2018-06-15 | End: 2018-08-16 | Stop reason: SDUPTHER

## 2018-06-17 DIAGNOSIS — E78.5 HYPERLIPIDEMIA, UNSPECIFIED HYPERLIPIDEMIA TYPE: ICD-10-CM

## 2018-06-18 RX ORDER — SIMVASTATIN 10 MG
TABLET ORAL
Qty: 30 TABLET | Refills: 3 | Status: SHIPPED | OUTPATIENT
Start: 2018-06-18 | End: 2019-04-01 | Stop reason: SDUPTHER

## 2018-08-16 DIAGNOSIS — E03.9 HYPOTHYROIDISM, UNSPECIFIED TYPE: ICD-10-CM

## 2018-08-17 RX ORDER — LEVOTHYROXINE SODIUM 0.12 MG/1
125 TABLET ORAL DAILY
Qty: 30 TABLET | Refills: 0 | Status: SHIPPED | OUTPATIENT
Start: 2018-08-17 | End: 2018-09-30 | Stop reason: SDUPTHER

## 2018-09-30 DIAGNOSIS — E03.9 HYPOTHYROIDISM, UNSPECIFIED TYPE: ICD-10-CM

## 2018-10-01 RX ORDER — LEVOTHYROXINE SODIUM 0.12 MG/1
TABLET ORAL
Qty: 30 TABLET | Refills: 5 | Status: SHIPPED | OUTPATIENT
Start: 2018-10-01 | End: 2019-04-01 | Stop reason: SDUPTHER

## 2018-10-02 DIAGNOSIS — B00.9 HERPES: ICD-10-CM

## 2018-10-02 RX ORDER — FAMCICLOVIR 250 MG/1
TABLET, FILM COATED ORAL
Qty: 30 TABLET | Refills: 2 | Status: SHIPPED | OUTPATIENT
Start: 2018-10-02 | End: 2019-04-05 | Stop reason: SDUPTHER

## 2018-10-03 ENCOUNTER — TRANSCRIBE ORDERS (OUTPATIENT)
Dept: ADMINISTRATIVE | Facility: HOSPITAL | Age: 58
End: 2018-10-03

## 2018-10-03 DIAGNOSIS — M85.89 OTHER SPECIFIED DISORDERS OF BONE DENSITY AND STRUCTURE, MULTIPLE SITES: Primary | ICD-10-CM

## 2018-10-08 ENCOUNTER — HOSPITAL ENCOUNTER (OUTPATIENT)
Dept: RADIOLOGY | Facility: CLINIC | Age: 58
Discharge: HOME | End: 2018-10-08
Payer: COMMERCIAL

## 2018-10-08 DIAGNOSIS — M85.89 OTHER SPECIFIED DISORDERS OF BONE DENSITY AND STRUCTURE, MULTIPLE SITES: ICD-10-CM

## 2018-10-08 PROCEDURE — 77080 DXA BONE DENSITY AXIAL: CPT

## 2018-10-09 ENCOUNTER — CLINICAL SUPPORT (OUTPATIENT)
Dept: FAMILY MEDICINE CLINIC | Facility: CLINIC | Age: 58
End: 2018-10-09
Payer: COMMERCIAL

## 2018-10-09 DIAGNOSIS — Z23 NEED FOR INFLUENZA VACCINATION: Primary | ICD-10-CM

## 2018-10-09 PROCEDURE — 90471 IMMUNIZATION ADMIN: CPT

## 2018-10-09 PROCEDURE — 90686 IIV4 VACC NO PRSV 0.5 ML IM: CPT

## 2018-10-15 ENCOUNTER — OFFICE VISIT (OUTPATIENT)
Dept: FAMILY MEDICINE CLINIC | Facility: CLINIC | Age: 58
End: 2018-10-15
Payer: COMMERCIAL

## 2018-10-15 ENCOUNTER — TRANSCRIBE ORDERS (OUTPATIENT)
Dept: RADIOLOGY | Facility: CLINIC | Age: 58
End: 2018-10-15

## 2018-10-15 ENCOUNTER — HOSPITAL ENCOUNTER (OUTPATIENT)
Dept: RADIOLOGY | Facility: CLINIC | Age: 58
Discharge: HOME | End: 2018-10-15
Payer: COMMERCIAL

## 2018-10-15 VITALS
DIASTOLIC BLOOD PRESSURE: 80 MMHG | HEIGHT: 60 IN | TEMPERATURE: 97.6 F | SYSTOLIC BLOOD PRESSURE: 130 MMHG | HEART RATE: 78 BPM | RESPIRATION RATE: 16 BRPM | WEIGHT: 111 LBS | BODY MASS INDEX: 21.79 KG/M2

## 2018-10-15 DIAGNOSIS — F41.8 DEPRESSION WITH ANXIETY: ICD-10-CM

## 2018-10-15 DIAGNOSIS — E78.00 HYPERCHOLESTEROLEMIA: ICD-10-CM

## 2018-10-15 DIAGNOSIS — Z12.11 SCREENING FOR COLON CANCER: ICD-10-CM

## 2018-10-15 DIAGNOSIS — E55.9 VITAMIN D DEFICIENCY: ICD-10-CM

## 2018-10-15 DIAGNOSIS — Z12.39 SCREENING FOR MALIGNANT NEOPLASM OF BREAST: ICD-10-CM

## 2018-10-15 DIAGNOSIS — E03.9 HYPOTHYROIDISM, UNSPECIFIED TYPE: Primary | ICD-10-CM

## 2018-10-15 PROBLEM — M48.061 SPINAL STENOSIS OF LUMBAR REGION: Status: ACTIVE | Noted: 2018-10-15

## 2018-10-15 PROBLEM — H40.9 GLAUCOMA: Status: ACTIVE | Noted: 2018-10-15

## 2018-10-15 PROBLEM — G93.32 CHRONIC FATIGUE SYNDROME: Status: ACTIVE | Noted: 2018-10-15

## 2018-10-15 PROBLEM — M06.9 RHEUMATOID ARTHRITIS (HCC): Status: ACTIVE | Noted: 2017-02-08

## 2018-10-15 PROBLEM — M48.00 SPINAL STENOSIS: Status: ACTIVE | Noted: 2018-10-15

## 2018-10-15 PROBLEM — R53.82 CHRONIC FATIGUE SYNDROME: Status: ACTIVE | Noted: 2018-10-15

## 2018-10-15 PROCEDURE — 1036F TOBACCO NON-USER: CPT | Performed by: FAMILY MEDICINE

## 2018-10-15 PROCEDURE — 77067 SCR MAMMO BI INCL CAD: CPT

## 2018-10-15 PROCEDURE — 3008F BODY MASS INDEX DOCD: CPT | Performed by: FAMILY MEDICINE

## 2018-10-15 PROCEDURE — 99214 OFFICE O/P EST MOD 30 MIN: CPT | Performed by: FAMILY MEDICINE

## 2018-10-15 NOTE — PROGRESS NOTES
Laura Rust 1960 female MRN: [id number]    [facility] - [address]  Follow-up Visit    ASSESSMENT/PLAN  Laura Rust is a 65 y o  female with significant PmhX of [place], Hypothyroidism, Sjorgrens, RA, Chronic fatigue, HLD, Vit D def , depression with anxiety presents to the office for     1  Screening Mammogram  - Mammo screening bilateral w cad; Future    2  Screening for colon cancer  - Ambulatory referral to Gastroenterology; Future    3  Hypothyroidism, unspecified type  TSH of 1 20  - Continue on Levothyroxine 125mcg daily    4  Vitamin D deficiency  - Started on 50,000 units x 8 weeks, then will continue taking 2000 units daily w/ repeat in 6 months  5  Hypercholesterolemia  - Reviewed FLP  - Continue on Simvastatin 10mg daily    6  Depression w/ Anxiety  - Controlled on Lexapro 20mg and Wellbutrin 300mg  Recently started Austedo 6mg per nuerology  - Of note patient with memory difficulties, will asses at our next visit  Disposition: Return to the clinic in 6 months      Future Appointments  Date Time Provider Yoon Haider   4/15/2019 9:30 AM Stan Laboy MD [facility]          SUBJECTIVE  CC: Follow-up (med check  and est PCP)      HPI:  Laura Rust is a 65 y o  female with significant PmhX of [place], Hypothyroidism, Sjorgrens, RA, Chronic fatigue, HLD, Vit D def , depression with anxiety presents to the office for chronic conditions    - Started on Austedo given her symptoms of memory diffculties 2/2 to [place] disease  She told her son, she is worried and hasn't told her daughter, struggling with how to do it  - Vit D  Just got prescribed 50,000 weekly by her rheumatologist   - Glaucoma being managed by opthal    -Depression w/ anxiety, takes Wellbutrin/ lexpro  She usually get refill from here  NO concerns with the medications  Feels controlled  - Thyroid : takes medications without s/e     HLD: Takes simvastatin w/ s/e  Denies any CP,SOB,GI/ symptoms at this time  Has no complaints today to discuss  Review of Systems   Constitutional: Positive for fatigue (chronic)  Negative for activity change, appetite change, chills and fever  HENT: Negative for congestion  Eyes: Negative for visual disturbance  Respiratory: Negative for cough, chest tightness and shortness of breath  Cardiovascular: Negative for chest pain and leg swelling  Gastrointestinal: Negative for abdominal distention, abdominal pain, constipation, diarrhea, nausea and vomiting  Musculoskeletal: Positive for arthralgias (chronic) and back pain (chronic)  Allergic/Immunologic: Negative for environmental allergies  Neurological: Negative for dizziness, light-headedness and headaches  Psychiatric/Behavioral: Positive for confusion (given huntingtons)  All other systems reviewed and are negative        Historical Information   The patient history was reviewed as follows:    Past Medical History:   Diagnosis Date    Allergic rhinitis     Glaucoma     Herpes gingivostomatitis     Herpes simplex infection     Multiple benign polyps of large intestine     Osteopenia      Past Surgical History:   Procedure Laterality Date    ESOPHAGOGASTRODUODENOSCOPY      DIAGNOSTIC    INCONTINENCE SURGERY      LAPAROSCOPIC SLING OPERATION FOR STRESS INCONTINENCE      Family History   Problem Relation Age of Onset    [place]'s disease Father     Coronary artery disease Family       Social History   History   Alcohol Use    Yes     Comment: SOCIAL      History   Drug use: Unknown     History   Smoking Status    Former Smoker   Smokeless Tobacco    Never Used       Medications:     Current Outpatient Prescriptions:     buPROPion (WELLBUTRIN XL) 300 mg 24 hr tablet, TAKE ONE TABLET BY MOUTH EVERY DAY IN THE MORNING  , Disp: 30 tablet, Rfl: 3    Deutetrabenazine (AUSTEDO) 6 MG TABS, Take by mouth, Disp: , Rfl:     diclofenac (VOLTAREN) 75 mg EC tablet, Take by mouth, Disp: , Rfl:     escitalopram (LEXAPRO) 20 mg tablet, Take 1 tablet by mouth daily, Disp: , Rfl:     famciclovir (FAMVIR) 250 MG tablet, TAKE 1 TABLET BY MOUTH ONCE A DAY , Disp: 30 tablet, Rfl: 2    hydroxychloroquine (PLAQUENIL) 200 mg tablet, Take by mouth, Disp: , Rfl:     levothyroxine 125 mcg tablet, TAKE 1 TABLET BY MOUTH ONCE A DAY , Disp: 30 tablet, Rfl: 5    simvastatin (ZOCOR) 10 mg tablet, TAKE 1 TABLET BY MOUTH ONCE A DAY , Disp: 30 tablet, Rfl: 3    tocilizumab (ACTEMRA) 200 mg/10 mL, Infuse into a venous catheter, Disp: , Rfl:   Allergies   Allergen Reactions    Sulfa Antibiotics Hives    Valacyclovir Palpitations       OBJECTIVE    Vitals:   Vitals:    10/15/18 1040   BP: 130/80   BP Location: Left arm   Patient Position: Sitting   Cuff Size: Standard   Pulse: 78   Resp: 16   Temp: 97 6 °F (36 4 °C)   Weight: 50 3 kg (111 lb)   Height: 5' (1 524 m)           Physical Exam   Constitutional: She is oriented to person, place, and time  Vital signs are normal  She appears well-developed and well-nourished  HENT:   Head: Normocephalic and atraumatic  Right Ear: Hearing normal    Left Ear: Hearing normal    Eyes: Pupils are equal, round, and reactive to light  Conjunctivae and EOM are normal    Neck: Normal range of motion  Neck supple  Cardiovascular: Normal rate, regular rhythm, S1 normal, S2 normal, normal heart sounds and intact distal pulses  No murmur heard  Pulmonary/Chest: Effort normal and breath sounds normal  No respiratory distress  She has no wheezes  Musculoskeletal: Normal range of motion  She exhibits no edema  Neurological: She is alert and oriented to person, place, and time  She has normal strength  Skin: Skin is warm  No rash noted  Psychiatric: Her speech is normal and behavior is normal  Judgment and thought content normal    Restless didn't sit for the entire visit, states that his is her baseline   Vitals reviewed  Alessandra Gutierres MD  [facility] [id number]

## 2018-11-01 DIAGNOSIS — F32.A DEPRESSION, UNSPECIFIED DEPRESSION TYPE: ICD-10-CM

## 2018-11-02 RX ORDER — BUPROPION HYDROCHLORIDE 300 MG/1
TABLET ORAL
Qty: 30 TABLET | Refills: 2 | Status: SHIPPED | OUTPATIENT
Start: 2018-11-02 | End: 2019-03-07 | Stop reason: SDUPTHER

## 2018-12-05 DIAGNOSIS — F32.A DEPRESSION, UNSPECIFIED DEPRESSION TYPE: Primary | ICD-10-CM

## 2018-12-05 RX ORDER — ESCITALOPRAM OXALATE 20 MG/1
TABLET ORAL
Qty: 30 TABLET | Refills: 1 | Status: SHIPPED | OUTPATIENT
Start: 2018-12-05 | End: 2019-04-25 | Stop reason: SDUPTHER

## 2019-03-07 DIAGNOSIS — F32.A DEPRESSION, UNSPECIFIED DEPRESSION TYPE: ICD-10-CM

## 2019-03-08 RX ORDER — BUPROPION HYDROCHLORIDE 300 MG/1
300 TABLET ORAL EVERY MORNING
Qty: 30 TABLET | Refills: 0 | Status: SHIPPED | OUTPATIENT
Start: 2019-03-08 | End: 2019-03-18 | Stop reason: SDUPTHER

## 2019-03-18 DIAGNOSIS — F32.A DEPRESSION, UNSPECIFIED DEPRESSION TYPE: ICD-10-CM

## 2019-03-18 RX ORDER — BUPROPION HYDROCHLORIDE 300 MG/1
300 TABLET ORAL EVERY MORNING
Qty: 30 TABLET | Refills: 0 | Status: SHIPPED | OUTPATIENT
Start: 2019-03-18 | End: 2019-05-13 | Stop reason: SDUPTHER

## 2019-04-01 DIAGNOSIS — E78.5 HYPERLIPIDEMIA, UNSPECIFIED HYPERLIPIDEMIA TYPE: ICD-10-CM

## 2019-04-01 DIAGNOSIS — E03.9 HYPOTHYROIDISM, UNSPECIFIED TYPE: ICD-10-CM

## 2019-04-01 RX ORDER — LEVOTHYROXINE SODIUM 0.12 MG/1
125 TABLET ORAL DAILY
Qty: 90 TABLET | Refills: 5 | Status: SHIPPED | OUTPATIENT
Start: 2019-04-01 | End: 2019-08-23 | Stop reason: SDUPTHER

## 2019-04-01 RX ORDER — SIMVASTATIN 10 MG
10 TABLET ORAL DAILY
Qty: 30 TABLET | Refills: 3 | Status: SHIPPED | OUTPATIENT
Start: 2019-04-01 | End: 2019-07-26 | Stop reason: SDUPTHER

## 2019-04-05 DIAGNOSIS — B00.9 HERPES: ICD-10-CM

## 2019-04-05 RX ORDER — FAMCICLOVIR 250 MG/1
250 TABLET, FILM COATED ORAL DAILY
Qty: 30 TABLET | Refills: 2 | Status: SHIPPED | OUTPATIENT
Start: 2019-04-05 | End: 2019-08-19 | Stop reason: SDUPTHER

## 2019-04-15 ENCOUNTER — OFFICE VISIT (OUTPATIENT)
Dept: FAMILY MEDICINE CLINIC | Facility: CLINIC | Age: 59
End: 2019-04-15
Payer: COMMERCIAL

## 2019-04-15 VITALS
HEIGHT: 60 IN | BODY MASS INDEX: 22.07 KG/M2 | WEIGHT: 112.4 LBS | RESPIRATION RATE: 12 BRPM | DIASTOLIC BLOOD PRESSURE: 84 MMHG | TEMPERATURE: 98 F | HEART RATE: 76 BPM | SYSTOLIC BLOOD PRESSURE: 132 MMHG

## 2019-04-15 DIAGNOSIS — Z12.4 PAP SMEAR FOR CERVICAL CANCER SCREENING: ICD-10-CM

## 2019-04-15 DIAGNOSIS — N95.2 VAGINAL ATROPHY: ICD-10-CM

## 2019-04-15 DIAGNOSIS — Z00.00 PHYSICAL EXAM: Primary | ICD-10-CM

## 2019-04-15 PROCEDURE — 99396 PREV VISIT EST AGE 40-64: CPT | Performed by: FAMILY MEDICINE

## 2019-04-17 LAB
CYTOLOGIST CVX/VAG CYTO: NORMAL
DX ICD CODE: NORMAL
HPV I/H RISK 1 DNA CVX QL PROBE+SIG AMP: NEGATIVE
OTHER STN SPEC: NORMAL
PATH REPORT.FINAL DX SPEC: NORMAL
SL AMB NOTE:: NORMAL
SL AMB SPECIMEN ADEQUACY: NORMAL

## 2019-04-18 ENCOUNTER — TELEPHONE (OUTPATIENT)
Dept: FAMILY MEDICINE CLINIC | Facility: CLINIC | Age: 59
End: 2019-04-18

## 2019-04-25 DIAGNOSIS — F32.A DEPRESSION, UNSPECIFIED DEPRESSION TYPE: ICD-10-CM

## 2019-04-25 RX ORDER — ESCITALOPRAM OXALATE 20 MG/1
20 TABLET ORAL DAILY
Qty: 90 TABLET | Refills: 2 | Status: SHIPPED | OUTPATIENT
Start: 2019-04-25 | End: 2019-08-23 | Stop reason: SDUPTHER

## 2019-05-10 ENCOUNTER — TELEPHONE (OUTPATIENT)
Dept: GASTROENTEROLOGY | Facility: CLINIC | Age: 59
End: 2019-05-10

## 2019-05-10 ENCOUNTER — OFFICE VISIT (OUTPATIENT)
Dept: GASTROENTEROLOGY | Facility: CLINIC | Age: 59
End: 2019-05-10
Payer: COMMERCIAL

## 2019-05-10 VITALS
TEMPERATURE: 98.6 F | SYSTOLIC BLOOD PRESSURE: 147 MMHG | BODY MASS INDEX: 22.78 KG/M2 | HEIGHT: 59 IN | WEIGHT: 113 LBS | HEART RATE: 69 BPM | DIASTOLIC BLOOD PRESSURE: 99 MMHG

## 2019-05-10 DIAGNOSIS — Z12.11 COLON CANCER SCREENING: Primary | ICD-10-CM

## 2019-05-10 DIAGNOSIS — K21.9 GASTROESOPHAGEAL REFLUX DISEASE, ESOPHAGITIS PRESENCE NOT SPECIFIED: ICD-10-CM

## 2019-05-10 PROCEDURE — 99244 OFF/OP CNSLTJ NEW/EST MOD 40: CPT | Performed by: INTERNAL MEDICINE

## 2019-05-10 RX ORDER — OMEPRAZOLE 20 MG/1
20 CAPSULE, DELAYED RELEASE ORAL DAILY
Qty: 30 CAPSULE | Refills: 3 | Status: SHIPPED | OUTPATIENT
Start: 2019-05-10 | End: 2019-09-09 | Stop reason: SDUPTHER

## 2019-05-10 RX ORDER — FOLIC ACID 1 MG/1
1 TABLET ORAL DAILY
COMMUNITY

## 2019-05-10 RX ORDER — ATORVASTATIN CALCIUM 10 MG/1
TABLET, FILM COATED ORAL DAILY
COMMUNITY
End: 2019-05-15

## 2019-05-13 DIAGNOSIS — F32.A DEPRESSION, UNSPECIFIED DEPRESSION TYPE: ICD-10-CM

## 2019-05-13 RX ORDER — BUPROPION HYDROCHLORIDE 300 MG/1
300 TABLET ORAL EVERY MORNING
Qty: 90 TABLET | Refills: 1 | Status: SHIPPED | OUTPATIENT
Start: 2019-05-13 | End: 2019-09-09 | Stop reason: SDUPTHER

## 2019-05-15 ENCOUNTER — ANESTHESIA EVENT (OUTPATIENT)
Dept: GASTROENTEROLOGY | Facility: AMBULARY SURGERY CENTER | Age: 59
End: 2019-05-15

## 2019-05-16 ENCOUNTER — HOSPITAL ENCOUNTER (OUTPATIENT)
Dept: GASTROENTEROLOGY | Facility: AMBULARY SURGERY CENTER | Age: 59
Setting detail: OUTPATIENT SURGERY
Discharge: HOME | End: 2019-05-16
Attending: INTERNAL MEDICINE
Payer: COMMERCIAL

## 2019-05-16 ENCOUNTER — ANESTHESIA (OUTPATIENT)
Dept: GASTROENTEROLOGY | Facility: AMBULARY SURGERY CENTER | Age: 59
End: 2019-05-16

## 2019-05-16 VITALS
TEMPERATURE: 99.1 F | BODY MASS INDEX: 22.82 KG/M2 | RESPIRATION RATE: 18 BRPM | SYSTOLIC BLOOD PRESSURE: 133 MMHG | HEIGHT: 59 IN | DIASTOLIC BLOOD PRESSURE: 64 MMHG | HEART RATE: 72 BPM | OXYGEN SATURATION: 96 %

## 2019-05-16 DIAGNOSIS — Z12.11 COLON CANCER SCREENING: ICD-10-CM

## 2019-05-16 PROCEDURE — 43239 EGD BIOPSY SINGLE/MULTIPLE: CPT | Performed by: INTERNAL MEDICINE

## 2019-05-16 PROCEDURE — NC001 PR NO CHARGE: Performed by: INTERNAL MEDICINE

## 2019-05-16 PROCEDURE — 88305 TISSUE EXAM BY PATHOLOGIST: CPT | Performed by: PATHOLOGY

## 2019-05-16 PROCEDURE — 45380 COLONOSCOPY AND BIOPSY: CPT | Performed by: INTERNAL MEDICINE

## 2019-05-16 RX ORDER — ONDANSETRON 2 MG/ML
4 INJECTION INTRAMUSCULAR; INTRAVENOUS ONCE AS NEEDED
Status: CANCELLED | OUTPATIENT
Start: 2019-05-16

## 2019-05-16 RX ORDER — SODIUM CHLORIDE, SODIUM LACTATE, POTASSIUM CHLORIDE, CALCIUM CHLORIDE 600; 310; 30; 20 MG/100ML; MG/100ML; MG/100ML; MG/100ML
125 INJECTION, SOLUTION INTRAVENOUS CONTINUOUS
Status: DISCONTINUED | OUTPATIENT
Start: 2019-05-16 | End: 2019-05-20 | Stop reason: HOSPADM

## 2019-05-16 RX ORDER — LIDOCAINE HYDROCHLORIDE 10 MG/ML
INJECTION, SOLUTION EPIDURAL; INFILTRATION; INTRACAUDAL; PERINEURAL AS NEEDED
Status: DISCONTINUED | OUTPATIENT
Start: 2019-05-16 | End: 2019-05-16 | Stop reason: SURG

## 2019-05-16 RX ORDER — PROPOFOL 10 MG/ML
INJECTION, EMULSION INTRAVENOUS AS NEEDED
Status: DISCONTINUED | OUTPATIENT
Start: 2019-05-16 | End: 2019-05-16 | Stop reason: SURG

## 2019-05-16 RX ADMIN — PROPOFOL 20 MG: 10 INJECTION, EMULSION INTRAVENOUS at 11:03

## 2019-05-16 RX ADMIN — PROPOFOL 20 MG: 10 INJECTION, EMULSION INTRAVENOUS at 11:25

## 2019-05-16 RX ADMIN — PROPOFOL 20 MG: 10 INJECTION, EMULSION INTRAVENOUS at 11:15

## 2019-05-16 RX ADMIN — SODIUM CHLORIDE, SODIUM LACTATE, POTASSIUM CHLORIDE, AND CALCIUM CHLORIDE: .6; .31; .03; .02 INJECTION, SOLUTION INTRAVENOUS at 10:44

## 2019-05-16 RX ADMIN — PROPOFOL 20 MG: 10 INJECTION, EMULSION INTRAVENOUS at 11:05

## 2019-05-16 RX ADMIN — PROPOFOL 20 MG: 10 INJECTION, EMULSION INTRAVENOUS at 11:09

## 2019-05-16 RX ADMIN — PROPOFOL 20 MG: 10 INJECTION, EMULSION INTRAVENOUS at 11:13

## 2019-05-16 RX ADMIN — PROPOFOL 100 MG: 10 INJECTION, EMULSION INTRAVENOUS at 11:01

## 2019-05-16 RX ADMIN — PROPOFOL 20 MG: 10 INJECTION, EMULSION INTRAVENOUS at 11:21

## 2019-05-16 RX ADMIN — PROPOFOL 20 MG: 10 INJECTION, EMULSION INTRAVENOUS at 11:16

## 2019-05-16 RX ADMIN — PROPOFOL 20 MG: 10 INJECTION, EMULSION INTRAVENOUS at 11:18

## 2019-05-16 RX ADMIN — LIDOCAINE HYDROCHLORIDE 50 MG: 10 INJECTION, SOLUTION EPIDURAL; INFILTRATION; INTRACAUDAL; PERINEURAL at 11:01

## 2019-05-16 RX ADMIN — PROPOFOL 20 MG: 10 INJECTION, EMULSION INTRAVENOUS at 11:06

## 2019-05-16 RX ADMIN — PROPOFOL 20 MG: 10 INJECTION, EMULSION INTRAVENOUS at 11:11

## 2019-05-22 ENCOUNTER — TELEPHONE (OUTPATIENT)
Dept: GASTROENTEROLOGY | Facility: AMBULARY SURGERY CENTER | Age: 59
End: 2019-05-22

## 2019-07-26 DIAGNOSIS — E78.5 HYPERLIPIDEMIA, UNSPECIFIED HYPERLIPIDEMIA TYPE: ICD-10-CM

## 2019-07-26 RX ORDER — SIMVASTATIN 10 MG
10 TABLET ORAL DAILY
Qty: 90 TABLET | Refills: 1 | Status: SHIPPED | OUTPATIENT
Start: 2019-07-26 | End: 2019-08-23 | Stop reason: SDUPTHER

## 2019-08-19 DIAGNOSIS — B00.9 HERPES: ICD-10-CM

## 2019-08-19 RX ORDER — FAMCICLOVIR 250 MG/1
250 TABLET, FILM COATED ORAL DAILY
Qty: 90 TABLET | Refills: 0 | Status: SHIPPED | OUTPATIENT
Start: 2019-08-19 | End: 2019-12-04 | Stop reason: SDUPTHER

## 2019-08-23 DIAGNOSIS — E03.9 HYPOTHYROIDISM, UNSPECIFIED TYPE: ICD-10-CM

## 2019-08-23 DIAGNOSIS — E78.5 HYPERLIPIDEMIA, UNSPECIFIED HYPERLIPIDEMIA TYPE: ICD-10-CM

## 2019-08-23 DIAGNOSIS — F32.A DEPRESSION, UNSPECIFIED DEPRESSION TYPE: ICD-10-CM

## 2019-08-23 RX ORDER — ESCITALOPRAM OXALATE 20 MG/1
20 TABLET ORAL DAILY
Qty: 90 TABLET | Refills: 0 | Status: SHIPPED | OUTPATIENT
Start: 2019-08-23 | End: 2021-01-22 | Stop reason: SDUPTHER

## 2019-08-23 RX ORDER — SIMVASTATIN 10 MG
10 TABLET ORAL DAILY
Qty: 90 TABLET | Refills: 0 | Status: SHIPPED | OUTPATIENT
Start: 2019-08-23 | End: 2019-11-23 | Stop reason: SDUPTHER

## 2019-08-23 RX ORDER — LEVOTHYROXINE SODIUM 0.12 MG/1
125 TABLET ORAL DAILY
Qty: 90 TABLET | Refills: 0 | Status: SHIPPED | OUTPATIENT
Start: 2019-08-23 | End: 2019-11-23 | Stop reason: SDUPTHER

## 2019-09-09 ENCOUNTER — TELEPHONE (OUTPATIENT)
Dept: GASTROENTEROLOGY | Facility: AMBULARY SURGERY CENTER | Age: 59
End: 2019-09-09

## 2019-09-09 DIAGNOSIS — Z12.11 COLON CANCER SCREENING: ICD-10-CM

## 2019-09-09 DIAGNOSIS — F32.A DEPRESSION, UNSPECIFIED DEPRESSION TYPE: ICD-10-CM

## 2019-09-09 RX ORDER — OMEPRAZOLE 20 MG/1
20 CAPSULE, DELAYED RELEASE ORAL DAILY
Qty: 30 CAPSULE | Refills: 5 | Status: SHIPPED | OUTPATIENT
Start: 2019-09-09 | End: 2019-09-10 | Stop reason: SDUPTHER

## 2019-09-09 RX ORDER — BUPROPION HYDROCHLORIDE 300 MG/1
300 TABLET ORAL EVERY MORNING
Qty: 90 TABLET | Refills: 0 | Status: SHIPPED | OUTPATIENT
Start: 2019-09-09 | End: 2019-09-18 | Stop reason: SDUPTHER

## 2019-09-09 NOTE — TELEPHONE ENCOUNTER
[facility] patient    [facility] to get refill    Refill Request for Medication: omeprazole    Dose: 20 mg    Quantity: 30 caps    Pharmacy: [facility]

## 2019-09-10 DIAGNOSIS — Z12.11 COLON CANCER SCREENING: ICD-10-CM

## 2019-09-10 RX ORDER — OMEPRAZOLE 20 MG/1
20 CAPSULE, DELAYED RELEASE ORAL DAILY
Qty: 90 CAPSULE | Refills: 1 | Status: SHIPPED | OUTPATIENT
Start: 2019-09-10 | End: 2019-09-11 | Stop reason: SDUPTHER

## 2019-09-11 DIAGNOSIS — Z12.11 COLON CANCER SCREENING: ICD-10-CM

## 2019-09-11 DIAGNOSIS — F32.A DEPRESSION, UNSPECIFIED DEPRESSION TYPE: ICD-10-CM

## 2019-09-11 RX ORDER — OMEPRAZOLE 20 MG/1
20 CAPSULE, DELAYED RELEASE ORAL DAILY
Qty: 90 CAPSULE | Refills: 1 | Status: SHIPPED | OUTPATIENT
Start: 2019-09-11 | End: 2020-04-09 | Stop reason: SDUPTHER

## 2019-09-11 RX ORDER — BUPROPION HYDROCHLORIDE 300 MG/1
300 TABLET ORAL EVERY MORNING
Qty: 90 TABLET | Refills: 0 | OUTPATIENT
Start: 2019-09-11

## 2019-09-18 DIAGNOSIS — F32.A DEPRESSION, UNSPECIFIED DEPRESSION TYPE: ICD-10-CM

## 2019-09-18 RX ORDER — BUPROPION HYDROCHLORIDE 300 MG/1
300 TABLET ORAL EVERY MORNING
Qty: 90 TABLET | Refills: 0 | Status: SHIPPED | OUTPATIENT
Start: 2019-09-18 | End: 2020-01-28

## 2019-10-24 ENCOUNTER — CLINICAL SUPPORT (OUTPATIENT)
Dept: FAMILY MEDICINE CLINIC | Facility: CLINIC | Age: 59
End: 2019-10-24
Payer: COMMERCIAL

## 2019-10-24 DIAGNOSIS — Z23 NEEDS FLU SHOT: Primary | ICD-10-CM

## 2019-10-24 PROCEDURE — 90471 IMMUNIZATION ADMIN: CPT

## 2019-10-24 PROCEDURE — 90682 RIV4 VACC RECOMBINANT DNA IM: CPT

## 2019-11-23 DIAGNOSIS — E78.5 HYPERLIPIDEMIA, UNSPECIFIED HYPERLIPIDEMIA TYPE: ICD-10-CM

## 2019-11-23 DIAGNOSIS — E03.9 HYPOTHYROIDISM, UNSPECIFIED TYPE: ICD-10-CM

## 2019-11-25 RX ORDER — SIMVASTATIN 10 MG
TABLET ORAL
Qty: 30 TABLET | Refills: 2 | Status: SHIPPED | OUTPATIENT
Start: 2019-11-25 | End: 2020-02-28

## 2019-11-25 RX ORDER — LEVOTHYROXINE SODIUM 0.12 MG/1
TABLET ORAL
Qty: 30 TABLET | Refills: 2 | Status: SHIPPED | OUTPATIENT
Start: 2019-11-25 | End: 2020-02-28

## 2019-12-04 DIAGNOSIS — B00.9 HERPES: ICD-10-CM

## 2019-12-04 RX ORDER — FAMCICLOVIR 250 MG/1
250 TABLET, FILM COATED ORAL DAILY
Qty: 90 TABLET | Refills: 0 | Status: SHIPPED | OUTPATIENT
Start: 2019-12-04 | End: 2020-03-12

## 2020-01-26 DIAGNOSIS — F32.A DEPRESSION, UNSPECIFIED DEPRESSION TYPE: ICD-10-CM

## 2020-01-28 RX ORDER — BUPROPION HYDROCHLORIDE 300 MG/1
TABLET ORAL
Qty: 30 TABLET | Refills: 4 | Status: SHIPPED | OUTPATIENT
Start: 2020-01-28 | End: 2020-05-04 | Stop reason: SDUPTHER

## 2020-02-28 DIAGNOSIS — E78.5 HYPERLIPIDEMIA, UNSPECIFIED HYPERLIPIDEMIA TYPE: ICD-10-CM

## 2020-02-28 DIAGNOSIS — Z12.31 ENCOUNTER FOR SCREENING MAMMOGRAM FOR BREAST CANCER: Primary | ICD-10-CM

## 2020-02-28 DIAGNOSIS — E03.9 HYPOTHYROIDISM, UNSPECIFIED TYPE: ICD-10-CM

## 2020-02-28 RX ORDER — LEVOTHYROXINE SODIUM 0.12 MG/1
TABLET ORAL
Qty: 30 TABLET | Refills: 2 | Status: SHIPPED | OUTPATIENT
Start: 2020-02-28 | End: 2020-04-09 | Stop reason: SDUPTHER

## 2020-02-28 RX ORDER — SIMVASTATIN 10 MG
TABLET ORAL
Qty: 30 TABLET | Refills: 2 | Status: SHIPPED | OUTPATIENT
Start: 2020-02-28 | End: 2021-01-22 | Stop reason: SDUPTHER

## 2020-02-28 NOTE — TELEPHONE ENCOUNTER
Refill given, However patietn is due for repeat labs and a follow up on chronic conditions  Also do for a Mammo see below  Please advise patient that we have a notification that says she is due for a Mammogram     If she already had one, please obtain info [facility]    If not please place an order, and explain that she will need to call [phone]  Explain to her that Order is placed in the computer and doesn't need to be picked up by the patient

## 2020-03-12 DIAGNOSIS — B00.9 HERPES: ICD-10-CM

## 2020-03-12 RX ORDER — FAMCICLOVIR 250 MG/1
TABLET, FILM COATED ORAL
Qty: 30 TABLET | Refills: 2 | Status: SHIPPED | OUTPATIENT
Start: 2020-03-12 | End: 2020-03-23 | Stop reason: SDUPTHER

## 2020-03-23 ENCOUNTER — TELEPHONE (OUTPATIENT)
Dept: FAMILY MEDICINE CLINIC | Facility: CLINIC | Age: 60
End: 2020-03-23

## 2020-03-23 DIAGNOSIS — B00.9 HERPES: ICD-10-CM

## 2020-03-23 RX ORDER — FAMCICLOVIR 250 MG/1
250 TABLET, FILM COATED ORAL DAILY
Qty: 30 TABLET | Refills: 2 | Status: SHIPPED | OUTPATIENT
Start: 2020-03-23 | End: 2020-08-27

## 2020-03-23 NOTE — TELEPHONE ENCOUNTER
Dr Lilli Marie,      Please resend her medications to [facility]  Phone number is [phone]

## 2020-03-23 NOTE — TELEPHONE ENCOUNTER
Sent in medication Famvir   Please call and see if she wants any other medications to be called into this pharmacy

## 2020-04-09 ENCOUNTER — TELEPHONE (OUTPATIENT)
Dept: GASTROENTEROLOGY | Facility: AMBULARY SURGERY CENTER | Age: 60
End: 2020-04-09

## 2020-04-09 DIAGNOSIS — Z12.11 COLON CANCER SCREENING: ICD-10-CM

## 2020-04-09 DIAGNOSIS — E03.9 HYPOTHYROIDISM, UNSPECIFIED TYPE: ICD-10-CM

## 2020-04-09 RX ORDER — LEVOTHYROXINE SODIUM 0.12 MG/1
125 TABLET ORAL DAILY
Qty: 90 TABLET | Refills: 2 | Status: SHIPPED | OUTPATIENT
Start: 2020-04-09 | End: 2020-11-14

## 2020-04-09 RX ORDER — OMEPRAZOLE 20 MG/1
20 CAPSULE, DELAYED RELEASE ORAL DAILY
Qty: 90 CAPSULE | Refills: 1 | Status: SHIPPED | OUTPATIENT
Start: 2020-04-09 | End: 2020-10-15 | Stop reason: SDUPTHER

## 2020-05-04 DIAGNOSIS — F32.A DEPRESSION, UNSPECIFIED DEPRESSION TYPE: ICD-10-CM

## 2020-05-04 RX ORDER — BUPROPION HYDROCHLORIDE 300 MG/1
300 TABLET ORAL EVERY MORNING
Qty: 60 TABLET | Refills: 0 | Status: SHIPPED | OUTPATIENT
Start: 2020-05-04 | End: 2020-09-02 | Stop reason: SDUPTHER

## 2020-06-04 ENCOUNTER — OFFICE VISIT (OUTPATIENT)
Dept: FAMILY MEDICINE CLINIC | Facility: CLINIC | Age: 60
End: 2020-06-04
Payer: COMMERCIAL

## 2020-06-04 VITALS
WEIGHT: 116 LBS | HEART RATE: 72 BPM | RESPIRATION RATE: 14 BRPM | HEIGHT: 59 IN | BODY MASS INDEX: 23.39 KG/M2 | DIASTOLIC BLOOD PRESSURE: 78 MMHG | SYSTOLIC BLOOD PRESSURE: 118 MMHG | TEMPERATURE: 99.2 F

## 2020-06-04 DIAGNOSIS — E03.9 HYPOTHYROIDISM, UNSPECIFIED TYPE: Primary | ICD-10-CM

## 2020-06-04 DIAGNOSIS — E78.00 HYPERCHOLESTEROLEMIA: ICD-10-CM

## 2020-06-04 DIAGNOSIS — Z00.00 PHYSICAL EXAM: ICD-10-CM

## 2020-06-04 PROCEDURE — 3008F BODY MASS INDEX DOCD: CPT | Performed by: FAMILY MEDICINE

## 2020-06-04 PROCEDURE — 99396 PREV VISIT EST AGE 40-64: CPT | Performed by: FAMILY MEDICINE

## 2020-08-26 DIAGNOSIS — B00.9 HERPES: ICD-10-CM

## 2020-08-27 RX ORDER — FAMCICLOVIR 250 MG/1
TABLET, FILM COATED ORAL
Qty: 30 TABLET | Refills: 2 | Status: SHIPPED | OUTPATIENT
Start: 2020-08-27 | End: 2021-01-22 | Stop reason: SDUPTHER

## 2020-09-02 DIAGNOSIS — F32.A DEPRESSION, UNSPECIFIED DEPRESSION TYPE: ICD-10-CM

## 2020-09-02 RX ORDER — BUPROPION HYDROCHLORIDE 300 MG/1
300 TABLET ORAL EVERY MORNING
Qty: 90 TABLET | Refills: 0 | Status: SHIPPED | OUTPATIENT
Start: 2020-09-02 | End: 2020-09-17

## 2020-09-17 DIAGNOSIS — F32.A DEPRESSION, UNSPECIFIED DEPRESSION TYPE: ICD-10-CM

## 2020-09-17 RX ORDER — BUPROPION HYDROCHLORIDE 300 MG/1
TABLET ORAL
Qty: 30 TABLET | Refills: 4 | Status: SHIPPED | OUTPATIENT
Start: 2020-09-17 | End: 2020-11-14

## 2020-10-15 DIAGNOSIS — Z12.11 COLON CANCER SCREENING: ICD-10-CM

## 2020-10-15 RX ORDER — OMEPRAZOLE 20 MG/1
20 CAPSULE, DELAYED RELEASE ORAL DAILY
Qty: 90 CAPSULE | Refills: 1 | Status: SHIPPED | OUTPATIENT
Start: 2020-10-15 | End: 2020-12-09 | Stop reason: SDUPTHER

## 2020-10-28 ENCOUNTER — TRANSCRIBE ORDERS (OUTPATIENT)
Dept: ADMINISTRATIVE | Facility: HOSPITAL | Age: 60
End: 2020-10-28

## 2020-10-28 DIAGNOSIS — M81.0 AGE-RELATED OSTEOPOROSIS WITHOUT CURRENT PATHOLOGICAL FRACTURE: Primary | ICD-10-CM

## 2020-11-09 ENCOUNTER — CLINICAL SUPPORT (OUTPATIENT)
Dept: FAMILY MEDICINE CLINIC | Facility: CLINIC | Age: 60
End: 2020-11-09
Payer: COMMERCIAL

## 2020-11-09 DIAGNOSIS — Z23 NEED FOR VACCINATION: Primary | ICD-10-CM

## 2020-11-09 PROCEDURE — 90471 IMMUNIZATION ADMIN: CPT

## 2020-11-09 PROCEDURE — 90682 RIV4 VACC RECOMBINANT DNA IM: CPT

## 2020-11-10 ENCOUNTER — TELEPHONE (OUTPATIENT)
Dept: FAMILY MEDICINE CLINIC | Facility: CLINIC | Age: 60
End: 2020-11-10

## 2020-11-14 DIAGNOSIS — F32.A DEPRESSION, UNSPECIFIED DEPRESSION TYPE: ICD-10-CM

## 2020-11-14 DIAGNOSIS — E03.9 HYPOTHYROIDISM, UNSPECIFIED TYPE: ICD-10-CM

## 2020-11-14 RX ORDER — LEVOTHYROXINE SODIUM 0.12 MG/1
TABLET ORAL
Qty: 90 TABLET | Refills: 2 | Status: SHIPPED | OUTPATIENT
Start: 2020-11-14 | End: 2022-02-28

## 2020-11-14 RX ORDER — BUPROPION HCL 300 MG
TABLET, EXTENDED RELEASE 24 HR ORAL
Qty: 90 TABLET | Refills: 3 | Status: SHIPPED | OUTPATIENT
Start: 2020-11-14 | End: 2021-09-24

## 2020-11-16 ENCOUNTER — HOSPITAL ENCOUNTER (OUTPATIENT)
Dept: RADIOLOGY | Facility: HOSPITAL | Age: 60
Discharge: HOME | End: 2020-11-16
Payer: COMMERCIAL

## 2020-11-16 DIAGNOSIS — M81.0 AGE-RELATED OSTEOPOROSIS WITHOUT CURRENT PATHOLOGICAL FRACTURE: ICD-10-CM

## 2020-11-16 PROCEDURE — 77080 DXA BONE DENSITY AXIAL: CPT

## 2020-12-09 ENCOUNTER — OFFICE VISIT (OUTPATIENT)
Dept: GASTROENTEROLOGY | Facility: CLINIC | Age: 60
End: 2020-12-09
Payer: COMMERCIAL

## 2020-12-09 VITALS
TEMPERATURE: 96.9 F | DIASTOLIC BLOOD PRESSURE: 93 MMHG | HEART RATE: 76 BPM | WEIGHT: 115.6 LBS | HEIGHT: 59 IN | SYSTOLIC BLOOD PRESSURE: 124 MMHG | BODY MASS INDEX: 23.31 KG/M2

## 2020-12-09 DIAGNOSIS — K59.00 CONSTIPATION, UNSPECIFIED CONSTIPATION TYPE: ICD-10-CM

## 2020-12-09 DIAGNOSIS — Z12.11 COLON CANCER SCREENING: ICD-10-CM

## 2020-12-09 DIAGNOSIS — K29.70 GASTRITIS WITHOUT BLEEDING, UNSPECIFIED CHRONICITY, UNSPECIFIED GASTRITIS TYPE: Primary | ICD-10-CM

## 2020-12-09 PROCEDURE — 1036F TOBACCO NON-USER: CPT | Performed by: INTERNAL MEDICINE

## 2020-12-09 PROCEDURE — 99214 OFFICE O/P EST MOD 30 MIN: CPT | Performed by: INTERNAL MEDICINE

## 2020-12-09 PROCEDURE — 3008F BODY MASS INDEX DOCD: CPT | Performed by: INTERNAL MEDICINE

## 2020-12-09 RX ORDER — PREDNISONE 1 MG/1
5 TABLET ORAL DAILY
COMMUNITY
End: 2021-10-08 | Stop reason: SDUPTHER

## 2020-12-09 RX ORDER — ERGOCALCIFEROL 1.25 MG/1
CAPSULE ORAL
COMMUNITY
End: 2022-01-31

## 2020-12-09 RX ORDER — OMEPRAZOLE 20 MG/1
20 CAPSULE, DELAYED RELEASE ORAL DAILY
Qty: 90 CAPSULE | Refills: 2 | Status: SHIPPED | OUTPATIENT
Start: 2020-12-09 | End: 2021-11-17

## 2020-12-09 RX ORDER — ZOLEDRONIC ACID 5 MG/100ML
INJECTION, SOLUTION INTRAVENOUS
COMMUNITY

## 2021-01-22 DIAGNOSIS — E78.5 HYPERLIPIDEMIA, UNSPECIFIED HYPERLIPIDEMIA TYPE: ICD-10-CM

## 2021-01-22 DIAGNOSIS — F32.A DEPRESSION, UNSPECIFIED DEPRESSION TYPE: ICD-10-CM

## 2021-01-22 DIAGNOSIS — B00.9 HERPES: ICD-10-CM

## 2021-01-22 RX ORDER — FAMCICLOVIR 250 MG/1
250 TABLET, FILM COATED ORAL DAILY
Qty: 30 TABLET | Refills: 2 | Status: SHIPPED | OUTPATIENT
Start: 2021-01-22 | End: 2021-06-27

## 2021-01-22 RX ORDER — ESCITALOPRAM OXALATE 20 MG/1
20 TABLET ORAL DAILY
Qty: 90 TABLET | Refills: 0 | Status: SHIPPED | OUTPATIENT
Start: 2021-01-22 | End: 2021-05-22

## 2021-01-22 RX ORDER — SIMVASTATIN 10 MG
10 TABLET ORAL DAILY
Qty: 90 TABLET | Refills: 2 | Status: SHIPPED | OUTPATIENT
Start: 2021-01-22 | End: 2021-01-27 | Stop reason: SDUPTHER

## 2021-01-23 LAB
ALBUMIN SERPL-MCNC: 4.1 G/DL (ref 3.8–4.9)
ALBUMIN SERPL-MCNC: 4.3 G/DL (ref 3.8–4.9)
ALBUMIN/GLOB SERPL: 1.8 {RATIO} (ref 1.2–2.2)
ALBUMIN/GLOB SERPL: 1.8 {RATIO} (ref 1.2–2.2)
ALP SERPL-CCNC: 34 IU/L (ref 39–117)
ALP SERPL-CCNC: 35 IU/L (ref 39–117)
ALT SERPL-CCNC: 14 IU/L (ref 0–32)
ALT SERPL-CCNC: 14 IU/L (ref 0–32)
AST SERPL-CCNC: 15 IU/L (ref 0–40)
AST SERPL-CCNC: 18 IU/L (ref 0–40)
BASOPHILS # BLD AUTO: 0 X10E3/UL (ref 0–0.2)
BASOPHILS # BLD AUTO: 0 X10E3/UL (ref 0–0.2)
BASOPHILS NFR BLD AUTO: 0 %
BASOPHILS NFR BLD AUTO: 0 %
BILIRUB SERPL-MCNC: 0.4 MG/DL (ref 0–1.2)
BILIRUB SERPL-MCNC: 0.4 MG/DL (ref 0–1.2)
BUN SERPL-MCNC: 8 MG/DL (ref 6–24)
BUN SERPL-MCNC: 9 MG/DL (ref 6–24)
BUN/CREAT SERPL: 11 (ref 9–23)
BUN/CREAT SERPL: 12 (ref 9–23)
CALCIUM SERPL-MCNC: 8.9 MG/DL (ref 8.7–10.2)
CALCIUM SERPL-MCNC: 9.2 MG/DL (ref 8.7–10.2)
CHLORIDE SERPL-SCNC: 102 MMOL/L (ref 96–106)
CHLORIDE SERPL-SCNC: 103 MMOL/L (ref 96–106)
CHOLEST SERPL-MCNC: 316 MG/DL (ref 100–199)
CO2 SERPL-SCNC: 24 MMOL/L (ref 20–29)
CO2 SERPL-SCNC: 24 MMOL/L (ref 20–29)
CREAT SERPL-MCNC: 0.7 MG/DL (ref 0.57–1)
CREAT SERPL-MCNC: 0.74 MG/DL (ref 0.57–1)
EOSINOPHIL # BLD AUTO: 0.1 X10E3/UL (ref 0–0.4)
EOSINOPHIL # BLD AUTO: 0.1 X10E3/UL (ref 0–0.4)
EOSINOPHIL NFR BLD AUTO: 2 %
EOSINOPHIL NFR BLD AUTO: 2 %
ERYTHROCYTE [DISTWIDTH] IN BLOOD BY AUTOMATED COUNT: 12.7 % (ref 11.7–15.4)
ERYTHROCYTE [DISTWIDTH] IN BLOOD BY AUTOMATED COUNT: 12.9 % (ref 11.7–15.4)
GLOBULIN SER-MCNC: 2.3 G/DL (ref 1.5–4.5)
GLOBULIN SER-MCNC: 2.4 G/DL (ref 1.5–4.5)
GLUCOSE SERPL-MCNC: 83 MG/DL (ref 65–99)
GLUCOSE SERPL-MCNC: 87 MG/DL (ref 65–99)
HCT VFR BLD AUTO: 41.3 % (ref 34–46.6)
HCT VFR BLD AUTO: 41.5 % (ref 34–46.6)
HDLC SERPL-MCNC: 82 MG/DL
HGB BLD-MCNC: 14.2 G/DL (ref 11.1–15.9)
HGB BLD-MCNC: 14.5 G/DL (ref 11.1–15.9)
IMM GRANULOCYTES # BLD: 0 X10E3/UL (ref 0–0.1)
IMM GRANULOCYTES # BLD: 0 X10E3/UL (ref 0–0.1)
IMM GRANULOCYTES NFR BLD: 0 %
IMM GRANULOCYTES NFR BLD: 0 %
LDLC SERPL CALC-MCNC: 214 MG/DL (ref 0–99)
LYMPHOCYTES # BLD AUTO: 0.8 X10E3/UL (ref 0.7–3.1)
LYMPHOCYTES # BLD AUTO: 0.8 X10E3/UL (ref 0.7–3.1)
LYMPHOCYTES NFR BLD AUTO: 19 %
LYMPHOCYTES NFR BLD AUTO: 20 %
MAGNESIUM SERPL-MCNC: 2.1 MG/DL (ref 1.6–2.3)
MCH RBC QN AUTO: 30.6 PG (ref 26.6–33)
MCH RBC QN AUTO: 31.4 PG (ref 26.6–33)
MCHC RBC AUTO-ENTMCNC: 34.2 G/DL (ref 31.5–35.7)
MCHC RBC AUTO-ENTMCNC: 35.1 G/DL (ref 31.5–35.7)
MCV RBC AUTO: 89 FL (ref 79–97)
MCV RBC AUTO: 89 FL (ref 79–97)
MONOCYTES # BLD AUTO: 0.4 X10E3/UL (ref 0.1–0.9)
MONOCYTES # BLD AUTO: 0.4 X10E3/UL (ref 0.1–0.9)
MONOCYTES NFR BLD AUTO: 10 %
MONOCYTES NFR BLD AUTO: 9 %
NEUTROPHILS # BLD AUTO: 2.8 X10E3/UL (ref 1.4–7)
NEUTROPHILS # BLD AUTO: 2.8 X10E3/UL (ref 1.4–7)
NEUTROPHILS NFR BLD AUTO: 69 %
NEUTROPHILS NFR BLD AUTO: 69 %
PLATELET # BLD AUTO: 182 X10E3/UL (ref 150–450)
PLATELET # BLD AUTO: 185 X10E3/UL (ref 150–450)
POTASSIUM SERPL-SCNC: 3.8 MMOL/L (ref 3.5–5.2)
POTASSIUM SERPL-SCNC: 3.8 MMOL/L (ref 3.5–5.2)
PROT SERPL-MCNC: 6.4 G/DL (ref 6–8.5)
PROT SERPL-MCNC: 6.7 G/DL (ref 6–8.5)
RBC # BLD AUTO: 4.62 X10E6/UL (ref 3.77–5.28)
RBC # BLD AUTO: 4.64 X10E6/UL (ref 3.77–5.28)
SL AMB EGFR AFRICAN AMERICAN: 103 ML/MIN/1.73
SL AMB EGFR AFRICAN AMERICAN: 110 ML/MIN/1.73
SL AMB EGFR NON AFRICAN AMERICAN: 89 ML/MIN/1.73
SL AMB EGFR NON AFRICAN AMERICAN: 95 ML/MIN/1.73
SL AMB VLDL CHOLESTEROL CALC: 20 MG/DL (ref 5–40)
SODIUM SERPL-SCNC: 139 MMOL/L (ref 134–144)
SODIUM SERPL-SCNC: 139 MMOL/L (ref 134–144)
TRIGL SERPL-MCNC: 118 MG/DL (ref 0–149)
TSH SERPL DL<=0.005 MIU/L-ACNC: 0.65 UIU/ML (ref 0.45–4.5)
TSH SERPL DL<=0.005 MIU/L-ACNC: 0.74 UIU/ML (ref 0.45–4.5)
VIT B12 SERPL-MCNC: 562 PG/ML (ref 232–1245)
WBC # BLD AUTO: 4.1 X10E3/UL (ref 3.4–10.8)
WBC # BLD AUTO: 4.1 X10E3/UL (ref 3.4–10.8)

## 2021-01-26 ENCOUNTER — TELEPHONE (OUTPATIENT)
Dept: FAMILY MEDICINE CLINIC | Facility: CLINIC | Age: 61
End: 2021-01-26

## 2021-01-26 NOTE — TELEPHONE ENCOUNTER
----- Message from Santosh Chance MD sent at 1/26/2021  8:39 AM EST -----  Needs a virtual appointment to discussed abnormal labs

## 2021-01-27 ENCOUNTER — TELEPHONE (OUTPATIENT)
Dept: FAMILY MEDICINE CLINIC | Facility: CLINIC | Age: 61
End: 2021-01-27

## 2021-01-27 ENCOUNTER — TELEMEDICINE (OUTPATIENT)
Dept: FAMILY MEDICINE CLINIC | Facility: CLINIC | Age: 61
End: 2021-01-27
Payer: COMMERCIAL

## 2021-01-27 DIAGNOSIS — H10.32 ACUTE CONJUNCTIVITIS OF LEFT EYE, UNSPECIFIED ACUTE CONJUNCTIVITIS TYPE: Primary | ICD-10-CM

## 2021-01-27 DIAGNOSIS — F41.8 DEPRESSION WITH ANXIETY: ICD-10-CM

## 2021-01-27 DIAGNOSIS — E78.5 HYPERLIPIDEMIA, UNSPECIFIED HYPERLIPIDEMIA TYPE: ICD-10-CM

## 2021-01-27 DIAGNOSIS — E03.9 HYPOTHYROIDISM, UNSPECIFIED TYPE: ICD-10-CM

## 2021-01-27 PROCEDURE — 99214 OFFICE O/P EST MOD 30 MIN: CPT | Performed by: FAMILY MEDICINE

## 2021-01-27 RX ORDER — SIMVASTATIN 20 MG
20 TABLET ORAL DAILY
Qty: 90 TABLET | Refills: 0 | Status: SHIPPED | OUTPATIENT
Start: 2021-01-27 | End: 2021-05-22

## 2021-01-27 RX ORDER — OFLOXACIN 3 MG/ML
1 SOLUTION/ DROPS OPHTHALMIC 4 TIMES DAILY
Qty: 5 ML | Refills: 0 | Status: SHIPPED | OUTPATIENT
Start: 2021-01-27 | End: 2021-02-03

## 2021-01-27 NOTE — TELEPHONE ENCOUNTER
----- Message from Charlene Ortega MD sent at 1/27/2021 11:26 AM EST -----  Please do the following1  Call and schedule for a physical in [place]   Email the patient her repeat order for cholesterol ( please write on the top of the paper repeat first week of April)

## 2021-01-27 NOTE — PROGRESS NOTES
Virtual Regular Visit      Assessment/Plan:    Problem List Items Addressed This Visit        Endocrine    Hypothyroidism       Other    Depression with anxiety      Other Visit Diagnoses     Acute conjunctivitis of left eye, unspecified acute conjunctivitis type    -  Primary    Relevant Medications    ofloxacin (OCUFLOX) 0 3 % ophthalmic solution    Hyperlipidemia, unspecified hyperlipidemia type        Relevant Medications    simvastatin (ZOCOR) 20 mg tablet    Other Relevant Orders    Lipid panel      Patient just recently got her blood work done from her previous physical in June  Her cholesterol was significantly elevated LDL was 214  Spent some time educating the patient on diet modifications  Increasing her simvastatin to 20 mg; start fish oil 2000 units daily  Repeat in 3 months  Hypothyroidism  No changes to be made  Depression anxiety well controlled  Recommend starting ofloxacin 1 drop 4 times a day for 7 days for acute conjunctivitis  Recommend bilateral eye drops to prevent reinfection  If symptoms do not improve recommend to contact our office         Reason for visit is   Chief Complaint   Patient presents with    Virtual Regular Visit        Encounter provider Ashutosh Oliver MD    Provider located at [address] [place] [id number]      Recent Visits  Date Type Provider Dept   01/26/21 [facility] recent visits within past 7 days and meeting all other requirements     Today's Visits  Date Type Provider Dept   01/27/21 Telemedicine Ashutosh Oliver MD [address] [place] [address] today's visits and meeting all other requirements     Future Appointments  No visits were found meeting these conditions  Showing future appointments within next 150 days and meeting all other requirements        The patient was identified by name and date of birth   Juwan Wilson was informed that this is a telemedicine visit and that the visit is being conducted through [facility] and patient was informed that this is a secure, HIPAA-compliant platform  She agrees to proceed     My office door was closed  No one else was in the room  She acknowledged consent and understanding of privacy and security of the video platform  The patient has agreed to participate and understands they can discontinue the visit at any time  Patient is aware this is a billable service  Ibeth Rodriguez is a 62 y o  female    Patient states that this morning she woke up with left eye crusty pain discharge  About to go to physical therapy in told her that she was highly contagious and to see her doctor  Patient denies any on the other side  Patient states that she overall eats a lot a turkey and has been eating healthy  She does not eat as much vegetables  Does not take fish oil  Does take her cholesterol medications as prescribed without any difficulties  Patient takes her thyroid medications as prescribed without any difficulties as well  Denies any acute symptoms besides her eye    Depression and anxiety well controlled per patient on medications        Past Medical History:   Diagnosis Date    Allergic rhinitis     Anxiety     Colitis     Colon polyps     + history for colon polyps    Depression     Disease of thyroid gland     Full dentures     GERD (gastroesophageal reflux disease)     Glaucoma     Glaucoma     Herpes gingivostomatitis     Herpes simplex infection     [place]'s [facility]     Hyperlipidemia     Hypertension     new onset 05/2019    Multiple benign polyps of large intestine     Osteopenia     Osteoporosis     RA [facility]rheumatoid arthritis) [facility]     Sjogren's disease ([facility] )        Past Surgical History:   Procedure Laterality Date    DENTAL SURGERY      upper and lower extractions    ESOPHAGOGASTRODUODENOSCOPY      DIAGNOSTIC    INCONTINENCE SURGERY      LAPAROSCOPIC SLING OPERATION FOR STRESS INCONTINENCE        Current Outpatient Medications   Medication Sig Dispense Refill    Cholecalciferol (VITAMIN D3) 2000 units CHEW Daily      Deutetrabenazine (AUSTEDO) 6 MG TABS Take by mouth daily at bedtime       diclofenac (VOLTAREN) 75 mg EC tablet Take 75 mg by mouth 2 (two) times a day       Docusate Calcium (STOOL SOFTENER PO) Stool Softener   1 qd      ergocalciferol (VITAMIN D2) 50,000 units ergocalciferol (vitamin D2) 1,250 mcg (50,000 unit) capsule   TAKE 1 CAPSULE BY MOUTH ONE TIME PER WEEK      escitalopram (LEXAPRO) 20 mg tablet Take 1 tablet (20 mg total) by mouth daily 90 tablet 0    famciclovir (FAMVIR) 250 MG tablet Take 1 tablet (250 mg total) by mouth daily 30 tablet 2    Fexofenadine HCl (ALLEGRA PO) Allegra Allergy      folic acid (FOLVITE) 1 mg tablet Daily      hydroxychloroquine (PLAQUENIL) 200 mg tablet Take by mouth      levothyroxine 125 mcg tablet TAKE 1 TABLET DAILY 90 tablet 2    methotrexate 2 5 mg tablet Take by mouth      Na Sulfate-K Sulfate-Mg Sulf 17 5-3 13-1 6 TG/292RF SOLN Take 1 applicator by mouth once for 1 dose 1 Bottle 0    ofloxacin (OCUFLOX) 0 3 % ophthalmic solution Administer 1 drop to both eyes 4 (four) times a day for 7 days 5 mL 0    omeprazole (PriLOSEC) 20 mg delayed release capsule Take 1 capsule (20 mg total) by mouth daily 90 capsule 2    Polyethyl Glycol-Propyl Glycol (SYSTANE OP) Systane (PF) 0 4 %-0 3 % eye drops in a dropperette   TID      prednisoLONE (MILLIPRED PO) prednisolone   5mg      predniSONE 5 mg tablet Take by mouth      simvastatin (ZOCOR) 20 mg tablet Take 1 tablet (20 mg total) by mouth daily 90 tablet 0    tocilizumab (ACTEMRA) 200 mg/10 mL Infuse into a venous catheter       Wellbutrin  MG 24 hr tablet TAKE 1 TABLET (300 MG TOTAL) EVERY MORNING 90 tablet 3    zoledronic acid (RECLAST) 5 mg/100 mL IV infusion (premix) zoledronic acid 5 mg/100 mL in mannitol 5 %-water intravenous piggybck yearly       No current facility-administered medications for this visit  Allergies   Allergen Reactions    Gold Itching    Sulfa Antibiotics Hives    Valacyclovir Palpitations       Review of Systems   Constitutional: Negative for activity change, appetite change, chills, fatigue and fever  HENT: Negative for congestion  Eyes: Positive for discharge and redness  Respiratory: Negative for cough, chest tightness and shortness of breath  Cardiovascular: Negative for chest pain and leg swelling  Gastrointestinal: Negative for abdominal distention, abdominal pain, constipation, diarrhea, nausea and vomiting  All other systems reviewed and are negative  Video Exam    There were no vitals filed for this visit  Physical Exam  Constitutional:       General: She is not in acute distress  Appearance: She is well-developed  She is not ill-appearing  Eyes:      Conjunctiva/sclera:      Left eye: Left conjunctiva is injected (discahrge)  Pulmonary:      Effort: Pulmonary effort is normal    Musculoskeletal: Normal range of motion  Skin:     Capillary Refill: Capillary refill takes less than 2 seconds  Neurological:      Mental Status: She is alert and oriented to person, place, and time  Psychiatric:         Behavior: Behavior normal          Thought Content: Thought content normal          Judgment: Judgment normal           I spent 15 minutes directly with the patient during this visit      [address] acknowledges that she has consented to an online visit or consultation  She understands that the online visit is based solely on information provided by her, and that, in the absence of a face-to-face physical evaluation by the physician, the diagnosis she receives is both limited and provisional in terms of accuracy and completeness  This is not intended to replace a full medical face-to-face evaluation by the physician   Lela Perla understands and accepts these terms

## 2021-01-27 NOTE — TELEPHONE ENCOUNTER
Called patient and scheduled PE 6/7  Emailed lab order to patient and noted for it to be done 1st week in April

## 2021-01-27 NOTE — Clinical Note
Please do the following    1  Call and schedule for a physical in June 2   Email the patient her repeat order for cholesterol ( please write on the top of the paper repeat first week of April)

## 2021-02-03 ENCOUNTER — HOSPITAL ENCOUNTER (OUTPATIENT)
Dept: RADIOLOGY | Facility: HOSPITAL | Age: 61
Discharge: HOME | End: 2021-02-03
Attending: FAMILY MEDICINE
Payer: COMMERCIAL

## 2021-02-03 DIAGNOSIS — Z12.31 ENCOUNTER FOR SCREENING MAMMOGRAM FOR BREAST CANCER: ICD-10-CM

## 2021-02-03 PROCEDURE — 77063 BREAST TOMOSYNTHESIS BI: CPT

## 2021-02-03 PROCEDURE — 77067 SCR MAMMO BI INCL CAD: CPT

## 2021-05-22 DIAGNOSIS — F32.A DEPRESSION, UNSPECIFIED DEPRESSION TYPE: ICD-10-CM

## 2021-05-22 DIAGNOSIS — E78.5 HYPERLIPIDEMIA, UNSPECIFIED HYPERLIPIDEMIA TYPE: ICD-10-CM

## 2021-05-22 RX ORDER — SIMVASTATIN 20 MG
TABLET ORAL
Qty: 30 TABLET | Refills: 2 | Status: SHIPPED | OUTPATIENT
Start: 2021-05-22 | End: 2021-08-28

## 2021-05-22 RX ORDER — ESCITALOPRAM OXALATE 20 MG/1
TABLET ORAL
Qty: 30 TABLET | Refills: 2 | Status: SHIPPED | OUTPATIENT
Start: 2021-05-22 | End: 2021-08-28

## 2021-06-02 ENCOUNTER — RA CDI HCC (OUTPATIENT)
Dept: OTHER | Facility: HOSPITAL | Age: 61
End: 2021-06-02

## 2021-06-02 NOTE — PROGRESS NOTES
[place] [address]  coding opportunities             Chart reviewed, (number of) suggestions sent to provider: 4     Problem listed updated  Provider Accepted, (number of) suggestions accepted: 4        Patients insurance company: [employer] [email or web address] (Medicare and Commercial for [employer] and [employer])     Visit status: Patient canceled the appointment        [place] [address]  coding opportunities             Chart reviewed, (number of) suggestions sent to provider: 4     Problem listed updated  Provider Accepted, (number of) suggestions accepted: 4        Patients insurance company: [facility] [employer] (Medicare and Commercial for [employer] and [employer])           [place] [address]  coding opportunities             Chart reviewed, (number of) suggestions sent to provider: 4  G10  Cotton's disease  M06 9  Rheumatoid arthritis, unspecified  M35 00  Sicca syndrome, unspecified    Consider splitting anxiety and depression into 2 separate codes  Anxiety does not risk adjust but most depression codes do      F32 0  Major depressive disorder, single episode, mild         Patients insurance company: [employer] (Medicare and Commercial for [employer] and [employer])

## 2021-06-03 PROBLEM — F32.0 MAJOR DEPRESSIVE DISORDER, SINGLE EPISODE, MILD (HCC): Status: ACTIVE | Noted: 2021-06-03

## 2021-06-10 ENCOUNTER — OFFICE VISIT (OUTPATIENT)
Dept: FAMILY MEDICINE CLINIC | Facility: CLINIC | Age: 61
End: 2021-06-10
Payer: COMMERCIAL

## 2021-06-10 VITALS
WEIGHT: 108 LBS | BODY MASS INDEX: 21.77 KG/M2 | RESPIRATION RATE: 14 BRPM | TEMPERATURE: 97.7 F | HEIGHT: 59 IN | DIASTOLIC BLOOD PRESSURE: 90 MMHG | HEART RATE: 72 BPM | SYSTOLIC BLOOD PRESSURE: 122 MMHG

## 2021-06-10 DIAGNOSIS — Z00.00 PE (PHYSICAL EXAM), ANNUAL: Primary | ICD-10-CM

## 2021-06-10 DIAGNOSIS — M81.0 OSTEOPOROSIS, UNSPECIFIED OSTEOPOROSIS TYPE, UNSPECIFIED PATHOLOGICAL FRACTURE PRESENCE: ICD-10-CM

## 2021-06-10 DIAGNOSIS — M35.00 SJOGREN'S SYNDROME, WITH UNSPECIFIED ORGAN INVOLVEMENT (HCC): ICD-10-CM

## 2021-06-10 DIAGNOSIS — E78.5 HYPERLIPIDEMIA, UNSPECIFIED HYPERLIPIDEMIA TYPE: ICD-10-CM

## 2021-06-10 DIAGNOSIS — F41.8 DEPRESSION WITH ANXIETY: ICD-10-CM

## 2021-06-10 DIAGNOSIS — Z23 NEED FOR VACCINATION: ICD-10-CM

## 2021-06-10 DIAGNOSIS — B00.9 HERPES: ICD-10-CM

## 2021-06-10 DIAGNOSIS — E03.9 HYPOTHYROIDISM, UNSPECIFIED TYPE: ICD-10-CM

## 2021-06-10 DIAGNOSIS — G10 HUNTINGTON DISEASE (HCC): ICD-10-CM

## 2021-06-10 DIAGNOSIS — M06.9 RHEUMATOID ARTHRITIS, INVOLVING UNSPECIFIED SITE, UNSPECIFIED WHETHER RHEUMATOID FACTOR PRESENT (HCC): ICD-10-CM

## 2021-06-10 PROBLEM — K57.90 DIVERTICULOSIS: Status: ACTIVE | Noted: 2021-06-10

## 2021-06-10 LAB
SL AMB  POCT GLUCOSE, UA: NORMAL
SL AMB LEUKOCYTE ESTERASE,UA: NORMAL
SL AMB POCT BILIRUBIN,UA: NORMAL
SL AMB POCT BLOOD,UA: NORMAL
SL AMB POCT CLARITY,UA: CLEAR
SL AMB POCT COLOR,UA: YELLOW
SL AMB POCT KETONES,UA: NORMAL
SL AMB POCT NITRITE,UA: NORMAL
SL AMB POCT PH,UA: 5
SL AMB POCT SPECIFIC GRAVITY,UA: 1
SL AMB POCT URINE PROTEIN: NORMAL
SL AMB POCT UROBILINOGEN: NORMAL

## 2021-06-10 PROCEDURE — 3725F SCREEN DEPRESSION PERFORMED: CPT | Performed by: FAMILY MEDICINE

## 2021-06-10 PROCEDURE — 99396 PREV VISIT EST AGE 40-64: CPT | Performed by: FAMILY MEDICINE

## 2021-06-10 PROCEDURE — 90471 IMMUNIZATION ADMIN: CPT | Performed by: FAMILY MEDICINE

## 2021-06-10 PROCEDURE — 90715 TDAP VACCINE 7 YRS/> IM: CPT | Performed by: FAMILY MEDICINE

## 2021-06-10 PROCEDURE — 1036F TOBACCO NON-USER: CPT | Performed by: FAMILY MEDICINE

## 2021-06-10 PROCEDURE — 3008F BODY MASS INDEX DOCD: CPT | Performed by: FAMILY MEDICINE

## 2021-06-10 PROCEDURE — 81003 URINALYSIS AUTO W/O SCOPE: CPT | Performed by: FAMILY MEDICINE

## 2021-06-10 NOTE — PROGRESS NOTES
[address] [facility]    NAME: Esa Hernandez  AGE: 62 y o  SEX: female  : 1960     DATE: 2021     Assessment and Plan:     Problem List Items Addressed This Visit        Endocrine    Hypothyroidism       Nervous and Auditory    [place] disease ([facility] )       Musculoskeletal and Integument    Rheumatoid arthritis ([facility] )       Other    Depression with anxiety    Sjogren's syndrome ([facility] [address] [facility] )      Other Visit Diagnoses     PE (physical exam), annual    -  Primary    Relevant Orders    POCT urine dip auto non-scope (Completed)    Hyperlipidemia, unspecified hyperlipidemia type        Herpes        Osteoporosis, unspecified osteoporosis type, unspecified pathological fracture presence        Need for vaccination        Relevant Orders    TDAP VACCINE GREATER THAN OR EQUAL TO 8YO IM (Completed)      [place] disease: stable continue recommendation from Neurology  Osteoporosis/RA/ Sjogrens: Being managed by Rheumatology  TDAP given today  Depression with anxiety: well controlled on medications  Hypothyroidism: levels recently repeated by specialist  Stable continue on medications as prescribed  Herpes" medications  To be continued as prescribed  HLD continue medications as prescribed  Immunizations and preventive care screenings were discussed with patient today  Appropriate education was printed on patient's after visit summary  ·          No follow-ups on file  Chief Complaint:     Chief Complaint   Patient presents with    Physical Exam     see's eye dr Norva Primrose twice a year      History of Present Illness:     Adult Annual Physical   Patient here for a comprehensive physical exam    Rheumatology every 3 months; every 4 weeks is treatment Actemra; then Reclast 1 time a year  Patient is having her Sjogren's, rheumatoid arthritis, osteoporosis managed by him  Patient being managed by dermatologist every 6 months    States that her neurological Zapata's is currently at baseline  Patient states that she has been very well controlled with her depression anxiety with her medications  She just recently had all her blood work done which was stable  Diet and Physical Activity  · Diet/Nutrition: well balanced diet  · Exercise: walking  Depression Screening  PHQ-9 Depression Screening    PHQ-9:   Frequency of the following problems over the past two weeks:      Little interest or pleasure in doing things: 0 - not at all  Feeling down, depressed, or hopeless: 0 - not at all  Trouble falling or staying asleep, or sleeping too much: 0 - not at all  Feeling tired or having little energy: 0 - not at all  Poor appetite or overeatin - not at all  Feeling bad about yourself - or that you are a failure or have let yourself or your family down: 0 - not at all  Trouble concentrating on things, such as reading the newspaper or watching television: 0 - not at all  Moving or speaking so slowly that other people could have noticed  Or the opposite - being so fidgety or restless that you have been moving around a lot more than usual: 0 - not at all  Thoughts that you would be better off dead, or of hurting yourself in some way: 0 - not at all  PHQ-2 Score: 0  PHQ-9 Score: 0       General Health  · Sleep: sleeps well  · Hearing: normal - bilateral   · Vision: Seeing eye doctor 6 months  · Dental: No teeth  hasn't gone for gum checks  Review of Systems:     Review of Systems   Constitutional: Negative for activity change, appetite change, chills, fatigue and fever  HENT: Negative for congestion  Respiratory: Negative for cough, chest tightness and shortness of breath  Cardiovascular: Negative for chest pain and leg swelling  Gastrointestinal: Negative for abdominal distention, abdominal pain, constipation, diarrhea, nausea and vomiting  All other systems reviewed and are negative       Past Medical History:     Past Medical History:   Diagnosis Date    Allergic rhinitis     Anxiety     Colitis     Colon polyps     + history for colon polyps    Depression     Disease of thyroid gland     Full dentures     GERD (gastroesophageal reflux disease)     Glaucoma     Glaucoma     Herpes gingivostomatitis     Herpes simplex infection     [place]'s disease ([place] [address] )     Hyperlipidemia     Hypertension     new onset 05/2019    Multiple benign polyps of large intestine     Osteopenia     Osteoporosis     [facility]) ([facility]     Sjogren's disease ([place] [address] )       Past Surgical History:     Past Surgical History:   Procedure Laterality Date    DENTAL SURGERY      upper and lower extractions    ESOPHAGOGASTRODUODENOSCOPY      DIAGNOSTIC    INCONTINENCE SURGERY      LAPAROSCOPIC SLING OPERATION FOR STRESS INCONTINENCE       Social History:        Social History     Socioeconomic History    Marital status: /Civil Union     Spouse name: None    Number of children: None    Years of education: None    Highest education level: None   Occupational History    None   Tobacco Use    Smoking status: Never Smoker    Smokeless tobacco: Never Used   Substance and Sexual Activity    Alcohol use: Not Currently     Comment: SOCIAL     Drug use: Never    Sexual activity: None   Other Topics Concern    None   Social History Narrative    Daily coffee consumption (2 cups/ day)     Social Determinants of Health     Financial Resource Strain:     Difficulty of Paying Living Expenses:    Food Insecurity:     Worried About Running Out of Food in the Last Year:     Ran Out of Food in the Last Year:    Transportation Needs:     Lack of Transportation (Medical):      Lack of Transportation (Non-Medical):    Physical Activity:     Days of Exercise per Week:     Minutes of Exercise per Session:    Stress:     Feeling of Stress :    Social Connections:     Frequency of Communication with Friends and Family:     Frequency of Social Gatherings with Friends and Family:     Attends Protestant Services:     Active Member of Clubs or Organizations:     Attends Club or Organization Meetings:     Marital Status:    Intimate Partner Violence:     Fear of Current or Ex-Partner:     Emotionally Abused:     Physically Abused:     Sexually Abused:       Family History:     Family History   Problem Relation Age of Onset    Livingston's disease Father     Alcohol abuse Father     Coronary artery disease Family     Hypertension Mother     Diabetes Mother     Meniere's disease Brother     Lupus Daughter     Immunodeficiency Son     Diabetes Paternal Grandmother     Heart disease Paternal Grandmother     No Known Problems Sister     No Known Problems Sister     No Known Problems Sister     Meniere's disease Brother       Current Medications:     Current Outpatient Medications   Medication Sig Dispense Refill    Deutetrabenazine (AUSTEDO) 6 MG TABS Take by mouth daily at bedtime       Docusate Calcium (STOOL SOFTENER PO) Take 100 mg by mouth daily       ergocalciferol (VITAMIN D2) 50,000 units ergocalciferol (vitamin D2) 1,250 mcg (50,000 unit) capsule   TAKE 1 CAPSULE BY MOUTH ONE TIME PER WEEK      escitalopram (LEXAPRO) 20 mg tablet TAKE 1 TABLET BY MOUTH EVERY DAY 30 tablet 2    Fexofenadine HCl (ALLEGRA PO) Take by mouth daily       folic acid (FOLVITE) 1 mg tablet Take 1 mg by mouth daily       hydroxychloroquine (PLAQUENIL) 200 mg tablet Take 200 mg by mouth daily with breakfast       levothyroxine 125 mcg tablet TAKE 1 TABLET DAILY 90 tablet 2    omeprazole (PriLOSEC) 20 mg delayed release capsule Take 1 capsule (20 mg total) by mouth daily 90 capsule 2    Polyethyl Glycol-Propyl Glycol (SYSTANE OP) Systane (PF) 0 4 %-0 3 % eye drops in a dropperette   TID      predniSONE 5 mg tablet Take 5 mg by mouth daily       simvastatin (ZOCOR) 20 mg tablet TAKE 1 TABLET BY MOUTH EVERY DAY 30 tablet 2    Cholecalciferol (VITAMIN D3) 2000 units CHEW Daily      famciclovir (FAMVIR) 250 MG tablet Take 1 tablet (250 mg total) by mouth daily 30 tablet 2    Na Sulfate-K Sulfate-Mg Sulf 17 5-3 13-1 6 LB/579XR SOLN Take 1 applicator by mouth once for 1 dose 1 Bottle 0    tocilizumab (ACTEMRA) 200 mg/10 mL Infuse into a venous catheter       Wellbutrin  MG 24 hr tablet TAKE 1 TABLET (300 MG TOTAL) EVERY MORNING 90 tablet 3    zoledronic acid (RECLAST) 5 mg/100 mL IV infusion (premix) zoledronic acid 5 mg/100 mL in mannitol 5 %-water intravenous piggybck   yearly       No current facility-administered medications for this visit  Allergies: Allergies   Allergen Reactions    Gold Itching    Sulfa Antibiotics Hives    Valacyclovir Palpitations      Physical Exam:     /90 (BP Location: Left arm, Patient Position: Sitting, Cuff Size: Standard)   Pulse 72   Temp 97 7 °F (36 5 °C) (Temporal)   Resp 14   Ht 4' 11" (1 499 m)   Wt 49 kg (108 lb)   BMI 21 81 kg/m²     Physical Exam  Vitals reviewed  Constitutional:       Appearance: Normal appearance  She is well-developed  HENT:      Head: Normocephalic and atraumatic  Right Ear: Tympanic membrane, ear canal and external ear normal  There is no impacted cerumen  Left Ear: Tympanic membrane, ear canal and external ear normal  There is no impacted cerumen  Nose: Nose normal       Mouth/Throat:      Mouth: Mucous membranes are moist       Pharynx: Oropharynx is clear  Eyes:      Conjunctiva/sclera: Conjunctivae normal       Pupils: Pupils are equal, round, and reactive to light  Cardiovascular:      Rate and Rhythm: Normal rate and regular rhythm  Heart sounds: Normal heart sounds  Pulmonary:      Effort: Pulmonary effort is normal       Breath sounds: Normal breath sounds  Abdominal:      General: Abdomen is flat  Bowel sounds are normal       Palpations: Abdomen is soft     Musculoskeletal:         General: Normal range of motion  Cervical back: Normal range of motion and neck supple  Skin:     General: Skin is warm  Capillary Refill: Capillary refill takes less than 2 seconds  Neurological:      General: No focal deficit present  Mental Status: She is alert and oriented to person, place, and time  Mental status is at baseline  Psychiatric:         Mood and Affect: Mood normal          Behavior: Behavior normal          Thought Content:  Thought content normal          Judgment: Judgment normal       Comments: Very positive energy          Ramiro Costello MD  [address]

## 2021-06-11 LAB
CHOLEST SERPL-MCNC: 225 MG/DL (ref 100–199)
HDLC SERPL-MCNC: 67 MG/DL
LDLC SERPL CALC-MCNC: 140 MG/DL (ref 0–99)
SL AMB VLDL CHOLESTEROL CALC: 18 MG/DL (ref 5–40)
TRIGL SERPL-MCNC: 105 MG/DL (ref 0–149)

## 2021-06-13 PROBLEM — M47.817 LUMBOSACRAL SPONDYLOSIS WITHOUT MYELOPATHY: Status: ACTIVE | Noted: 2020-11-05

## 2021-06-13 PROBLEM — Z12.11 ENCOUNTER FOR SCREENING FOR MALIGNANT NEOPLASM OF COLON: Status: ACTIVE | Noted: 2021-06-13

## 2021-06-13 PROBLEM — Z23 NEED FOR IMMUNIZATION AGAINST INFLUENZA: Status: ACTIVE | Noted: 2021-06-13

## 2021-06-13 PROBLEM — R51.9 FREQUENT HEADACHES: Status: ACTIVE | Noted: 2021-06-13

## 2021-06-13 PROBLEM — Z12.31 ENCOUNTER FOR SCREENING MAMMOGRAM FOR MALIGNANT NEOPLASM OF BREAST: Status: ACTIVE | Noted: 2021-06-13

## 2021-06-15 ENCOUNTER — TELEPHONE (OUTPATIENT)
Dept: FAMILY MEDICINE CLINIC | Facility: CLINIC | Age: 61
End: 2021-06-15

## 2021-06-15 NOTE — TELEPHONE ENCOUNTER
----- Message from Christine Perez MD sent at 6/14/2021 12:43 PM EDT -----   Please advise the patient that her cholesterol is improving in the right direction  Her LDL went from 214->140  I recommend that the patient  continue to reduce carbohydrates and try to exercise  Advised patient that yes there is an improvement b  ut is still not at goal   Our goal is to be less than 100 LDL

## 2021-06-26 DIAGNOSIS — B00.9 HERPES: ICD-10-CM

## 2021-06-27 RX ORDER — FAMCICLOVIR 250 MG/1
TABLET, FILM COATED ORAL
Qty: 30 TABLET | Refills: 2 | Status: SHIPPED | OUTPATIENT
Start: 2021-06-27 | End: 2021-10-02

## 2021-07-22 ENCOUNTER — OFFICE VISIT (OUTPATIENT)
Dept: FAMILY MEDICINE CLINIC | Facility: CLINIC | Age: 61
End: 2021-07-22
Payer: COMMERCIAL

## 2021-07-22 VITALS
WEIGHT: 107.8 LBS | HEIGHT: 59 IN | DIASTOLIC BLOOD PRESSURE: 86 MMHG | SYSTOLIC BLOOD PRESSURE: 118 MMHG | HEART RATE: 72 BPM | BODY MASS INDEX: 21.73 KG/M2 | OXYGEN SATURATION: 98 % | TEMPERATURE: 98.4 F | RESPIRATION RATE: 16 BRPM

## 2021-07-22 DIAGNOSIS — H00.013 HORDEOLUM EXTERNUM OF RIGHT EYE, UNSPECIFIED EYELID: ICD-10-CM

## 2021-07-22 DIAGNOSIS — H00.013 HORDEOLUM EXTERNUM OF RIGHT EYE, UNSPECIFIED EYELID: Primary | ICD-10-CM

## 2021-07-22 PROCEDURE — 1036F TOBACCO NON-USER: CPT | Performed by: FAMILY MEDICINE

## 2021-07-22 PROCEDURE — 3008F BODY MASS INDEX DOCD: CPT | Performed by: FAMILY MEDICINE

## 2021-07-22 PROCEDURE — 99213 OFFICE O/P EST LOW 20 MIN: CPT | Performed by: FAMILY MEDICINE

## 2021-07-22 RX ORDER — AMOXICILLIN 500 MG/1
500 CAPSULE ORAL EVERY 12 HOURS SCHEDULED
Qty: 14 CAPSULE | Refills: 0 | Status: SHIPPED | OUTPATIENT
Start: 2021-07-22 | End: 2021-07-22 | Stop reason: SDUPTHER

## 2021-07-22 RX ORDER — ERYTHROMYCIN 5 MG/G
0.5 OINTMENT OPHTHALMIC
Qty: 3.5 G | Refills: 0 | Status: SHIPPED | OUTPATIENT
Start: 2021-07-22 | End: 2021-08-01

## 2021-07-22 RX ORDER — ERYTHROMYCIN 5 MG/G
0.5 OINTMENT OPHTHALMIC
Qty: 3.5 G | Refills: 0 | Status: SHIPPED | OUTPATIENT
Start: 2021-07-22 | End: 2021-07-22 | Stop reason: SDUPTHER

## 2021-07-22 RX ORDER — AMOXICILLIN 500 MG/1
500 CAPSULE ORAL EVERY 12 HOURS SCHEDULED
Qty: 14 CAPSULE | Refills: 0 | Status: SHIPPED | OUTPATIENT
Start: 2021-07-22 | End: 2021-07-29

## 2021-07-22 NOTE — PROGRESS NOTES
Aggie Guan 1960 female MRN: [id number]    [facility] OFFICE VISIT  [facility] - [address]      ASSESSMENT/PLAN  Aggie Guan is a 62 y o  female presents to the office for    Diagnoses and all orders for this visit:    Hordeolum externum of right eye, unspecified eyelid  -     Discontinue: erythromycin (ILOTYCIN) ophthalmic ointment; Administer 0 5 inches to both eyes daily at bedtime for 10 days  -     Discontinue: amoxicillin (AMOXIL) 500 mg capsule; Take 1 capsule (500 mg total) by mouth every 12 (twelve) hours for 7 days       PLAN: Frequent warm soaks, use antibiotic ophthalmic ointment as prescribed, and follow up if symptoms persist or worsen  It may take several days for this to resolve  POCKET script given incase infection persist   Rarely, these persist or enlarge and in that event, she will need to see an Ophthalmologist  Patient agrees with the medical treatment plan  Future Appointments   Date Time Provider Yoon Haider   8/3/2021  9:40 AM EMF [place] NURSE 1 EMF [place] None   8/3/2021 10:40 AM Farooq Mcgarry MD EMF [place] None   9/1/2021  2:40 PM [place]F [place] NURSE 1 EMF [place] None   9/29/2021  1:20 PM [place]F [place] NURSE 1 EMF [place] None   10/27/2021  9:40 AM EM[facility] [place] NURSE 1 EMF [place] None   10/27/2021 10:00 AM Cassia Esparza PA-C EMF [place] None          SUBJECTIVE  CC: Eye Problem (patient here with left eyelid swollen and red for 3 days)      HPI:  Aggie Guan is a 62 y o  female  who complains of a left lower eyelid stye for 3 day(s)  No fever, chills, no URI symptoms, no history of foreign body in the eye  Vision has been normal   Denies any over counter medications being applied to the area  Does admit to working in the yd and washing her hands appropriately  Review of Systems   Constitutional: Negative for activity change, appetite change, chills, fatigue and fever  HENT: Negative for congestion      Eyes: Positive for pain  Respiratory: Negative for cough, chest tightness and shortness of breath  Cardiovascular: Negative for chest pain and leg swelling  Gastrointestinal: Negative for abdominal distention, abdominal pain, constipation, diarrhea, nausea and vomiting  All other systems reviewed and are negative        Historical Information   The patient history was reviewed as follows:  Past Medical History:   Diagnosis Date    Allergic rhinitis     Anxiety     Colitis     Colon polyps     + history for colon polyps    Depression     Disease of thyroid gland     Full dentures     GERD (gastroesophageal reflux disease)     Glaucoma     Glaucoma     Herpes gingivostomatitis     Herpes simplex infection     [place]'s disease ([place] [address] )     Hyperlipidemia     Hypertension     new onset 05/2019    Multiple benign polyps of large intestine     Osteopenia     Osteoporosis     [facility] )     Sjogren's disease (HCC)          Medications:     Current Outpatient Medications:     Cholecalciferol (VITAMIN D3) 2000 units CHEW, Daily, Disp: , Rfl:     Deutetrabenazine (AUSTEDO) 6 MG TABS, Take by mouth daily at bedtime , Disp: , Rfl:     Docusate Calcium (STOOL SOFTENER PO), Take 100 mg by mouth daily , Disp: , Rfl:     ergocalciferol (VITAMIN D2) 50,000 units, ergocalciferol (vitamin D2) 1,250 mcg (50,000 unit) capsule  TAKE 1 CAPSULE BY MOUTH ONE TIME PER WEEK, Disp: , Rfl:     escitalopram (LEXAPRO) 20 mg tablet, TAKE 1 TABLET BY MOUTH EVERY DAY, Disp: 30 tablet, Rfl: 2    famciclovir (FAMVIR) 250 MG tablet, TAKE 1 TABLET BY MOUTH EVERY DAY, Disp: 30 tablet, Rfl: 2    Fexofenadine HCl (ALLEGRA PO), Take by mouth daily , Disp: , Rfl:     folic acid (FOLVITE) 1 mg tablet, Take 1 mg by mouth daily , Disp: , Rfl:     hydroxychloroquine (PLAQUENIL) 200 mg tablet, Take 200 mg by mouth daily with breakfast , Disp: , Rfl:     levothyroxine 125 mcg tablet, TAKE 1 TABLET DAILY, Disp: 90 tablet, Rfl: 2    Na Sulfate-K Sulfate-Mg Sulf 17 5-3 13-1 6 GM/177ML SOLN, Take 1 applicator by mouth once for 1 dose, Disp: 1 Bottle, Rfl: 0    omeprazole (PriLOSEC) 20 mg delayed release capsule, Take 1 capsule (20 mg total) by mouth daily, Disp: 90 capsule, Rfl: 2    Polyethyl Glycol-Propyl Glycol (SYSTANE OP), Systane (PF) 0 4 %-0 3 % eye drops in a dropperette  TID, Disp: , Rfl:     predniSONE 5 mg tablet, Take 5 mg by mouth daily , Disp: , Rfl:     simvastatin (ZOCOR) 20 mg tablet, TAKE 1 TABLET BY MOUTH EVERY DAY, Disp: 30 tablet, Rfl: 2    tocilizumab (ACTEMRA) 200 mg/10 mL, Infuse into a venous catheter , Disp: , Rfl:     Wellbutrin  MG 24 hr tablet, TAKE 1 TABLET (300 MG TOTAL) EVERY MORNING, Disp: 90 tablet, Rfl: 3    zoledronic acid (RECLAST) 5 mg/100 mL IV infusion (premix), zoledronic acid 5 mg/100 mL in mannitol 5 %-water intravenous piggybck  yearly, Disp: , Rfl:     Allergies   Allergen Reactions    Gold Itching    Sulfa Antibiotics Hives    Valacyclovir Palpitations       OBJECTIVE  Vitals:   Vitals:    07/22/21 1351   BP: 118/86   BP Location: Left arm   Patient Position: Sitting   Cuff Size: Standard   Pulse: 72   Resp: 16   Temp: 98 4 °F (36 9 °C)   TempSrc: Temporal   SpO2: 98%   Weight: 48 9 kg (107 lb 12 8 oz)   Height: 4' 11" (1 499 m)         Physical Exam  Vitals reviewed  Constitutional:       Appearance: She is well-developed  HENT:      Head: Normocephalic and atraumatic  Eyes:      General:         Left eye: Hordeolum present  Conjunctiva/sclera: Conjunctivae normal       Pupils: Pupils are equal, round, and reactive to light  Cardiovascular:      Rate and Rhythm: Normal rate and regular rhythm  Heart sounds: Normal heart sounds  Pulmonary:      Effort: Pulmonary effort is normal  No respiratory distress  Breath sounds: Normal breath sounds  Musculoskeletal:         General: Normal range of motion        Cervical back: Normal range of motion and neck supple  Skin:     General: Skin is warm  Capillary Refill: Capillary refill takes less than 2 seconds  Neurological:      Mental Status: She is alert and oriented to person, place, and time                      Paulino Olivo MD,   [facility]  7/22/2021

## 2021-07-22 NOTE — TELEPHONE ENCOUNTER
Dr Neelam Pascual    Patient says amoxicillin and erythromycin were sent to mail away pharmacy  Please resend to [facility]

## 2021-08-27 DIAGNOSIS — E78.5 HYPERLIPIDEMIA, UNSPECIFIED HYPERLIPIDEMIA TYPE: ICD-10-CM

## 2021-08-27 DIAGNOSIS — F32.A DEPRESSION, UNSPECIFIED DEPRESSION TYPE: ICD-10-CM

## 2021-08-28 RX ORDER — SIMVASTATIN 20 MG
TABLET ORAL
Qty: 30 TABLET | Refills: 2 | Status: SHIPPED | OUTPATIENT
Start: 2021-08-28 | End: 2021-12-11

## 2021-08-28 RX ORDER — ESCITALOPRAM OXALATE 20 MG/1
TABLET ORAL
Qty: 30 TABLET | Refills: 2 | Status: SHIPPED | OUTPATIENT
Start: 2021-08-28 | End: 2021-12-11

## 2021-09-24 DIAGNOSIS — F32.A DEPRESSION, UNSPECIFIED DEPRESSION TYPE: ICD-10-CM

## 2021-09-24 RX ORDER — BUPROPION HYDROCHLORIDE 300 MG/1
TABLET ORAL
Qty: 90 TABLET | Refills: 1 | Status: SHIPPED | OUTPATIENT
Start: 2021-09-24 | End: 2022-03-26

## 2021-10-02 DIAGNOSIS — B00.9 HERPES: ICD-10-CM

## 2021-10-02 RX ORDER — FAMCICLOVIR 250 MG/1
TABLET, FILM COATED ORAL
Qty: 30 TABLET | Refills: 2 | Status: SHIPPED | OUTPATIENT
Start: 2021-10-02 | End: 2021-12-30

## 2021-10-27 PROBLEM — Z86.39 HISTORY OF GRAVES' DISEASE: Status: ACTIVE | Noted: 2021-10-27

## 2021-10-27 PROBLEM — M05.79 RHEUMATOID ARTHRITIS INVOLVING MULTIPLE SITES WITH POSITIVE RHEUMATOID FACTOR (HCC): Status: ACTIVE | Noted: 2017-02-08

## 2021-10-27 PROBLEM — Z23 NEED FOR IMMUNIZATION AGAINST INFLUENZA: Status: RESOLVED | Noted: 2021-06-13 | Resolved: 2021-10-27

## 2021-11-16 DIAGNOSIS — Z12.11 COLON CANCER SCREENING: ICD-10-CM

## 2021-11-17 RX ORDER — OMEPRAZOLE 20 MG/1
CAPSULE, DELAYED RELEASE ORAL
Qty: 90 CAPSULE | Refills: 1 | Status: SHIPPED | OUTPATIENT
Start: 2021-11-17 | End: 2021-12-01 | Stop reason: SDUPTHER

## 2021-12-01 ENCOUNTER — OFFICE VISIT (OUTPATIENT)
Dept: GASTROENTEROLOGY | Facility: CLINIC | Age: 61
End: 2021-12-01
Payer: COMMERCIAL

## 2021-12-01 VITALS
WEIGHT: 113.2 LBS | DIASTOLIC BLOOD PRESSURE: 89 MMHG | SYSTOLIC BLOOD PRESSURE: 128 MMHG | HEART RATE: 79 BPM | TEMPERATURE: 97 F | HEIGHT: 60 IN | BODY MASS INDEX: 22.23 KG/M2

## 2021-12-01 DIAGNOSIS — Z12.11 COLON CANCER SCREENING: ICD-10-CM

## 2021-12-01 DIAGNOSIS — K29.30 CHRONIC SUPERFICIAL GASTRITIS WITHOUT BLEEDING: ICD-10-CM

## 2021-12-01 DIAGNOSIS — K21.9 GASTROESOPHAGEAL REFLUX DISEASE WITHOUT ESOPHAGITIS: Primary | ICD-10-CM

## 2021-12-01 PROCEDURE — 3008F BODY MASS INDEX DOCD: CPT | Performed by: PHYSICIAN ASSISTANT

## 2021-12-01 PROCEDURE — 1036F TOBACCO NON-USER: CPT | Performed by: PHYSICIAN ASSISTANT

## 2021-12-01 PROCEDURE — 99213 OFFICE O/P EST LOW 20 MIN: CPT | Performed by: PHYSICIAN ASSISTANT

## 2021-12-11 DIAGNOSIS — F32.A DEPRESSION, UNSPECIFIED DEPRESSION TYPE: ICD-10-CM

## 2021-12-11 DIAGNOSIS — E78.5 HYPERLIPIDEMIA, UNSPECIFIED HYPERLIPIDEMIA TYPE: ICD-10-CM

## 2021-12-11 RX ORDER — SIMVASTATIN 20 MG
TABLET ORAL
Qty: 30 TABLET | Refills: 2 | Status: SHIPPED | OUTPATIENT
Start: 2021-12-11 | End: 2022-03-07

## 2021-12-11 RX ORDER — ESCITALOPRAM OXALATE 20 MG/1
TABLET ORAL
Qty: 30 TABLET | Refills: 2 | Status: SHIPPED | OUTPATIENT
Start: 2021-12-11 | End: 2022-03-07

## 2021-12-30 DIAGNOSIS — B00.9 HERPES: ICD-10-CM

## 2021-12-30 RX ORDER — FAMCICLOVIR 250 MG/1
TABLET, FILM COATED ORAL
Qty: 30 TABLET | Refills: 2 | Status: SHIPPED | OUTPATIENT
Start: 2021-12-30 | End: 2022-03-26

## 2022-02-22 ENCOUNTER — HOSPITAL ENCOUNTER (OUTPATIENT)
Dept: RADIOLOGY | Facility: HOSPITAL | Age: 62
Discharge: HOME | End: 2022-02-22
Attending: FAMILY MEDICINE
Payer: COMMERCIAL

## 2022-02-22 ENCOUNTER — TELEPHONE (OUTPATIENT)
Dept: FAMILY MEDICINE CLINIC | Facility: CLINIC | Age: 62
End: 2022-02-22

## 2022-02-22 ENCOUNTER — OFFICE VISIT (OUTPATIENT)
Dept: FAMILY MEDICINE CLINIC | Facility: CLINIC | Age: 62
End: 2022-02-22
Payer: COMMERCIAL

## 2022-02-22 VITALS
RESPIRATION RATE: 16 BRPM | DIASTOLIC BLOOD PRESSURE: 72 MMHG | SYSTOLIC BLOOD PRESSURE: 128 MMHG | HEIGHT: 60 IN | TEMPERATURE: 98 F | BODY MASS INDEX: 21.6 KG/M2 | WEIGHT: 110 LBS | HEART RATE: 72 BPM

## 2022-02-22 VITALS — HEIGHT: 60 IN | BODY MASS INDEX: 21.6 KG/M2 | WEIGHT: 110 LBS

## 2022-02-22 DIAGNOSIS — E78.00 HYPERCHOLESTEROLEMIA: ICD-10-CM

## 2022-02-22 DIAGNOSIS — M54.50 LOW BACK PAIN, UNSPECIFIED BACK PAIN LATERALITY, UNSPECIFIED CHRONICITY, UNSPECIFIED WHETHER SCIATICA PRESENT: ICD-10-CM

## 2022-02-22 DIAGNOSIS — M05.79 RHEUMATOID ARTHRITIS INVOLVING MULTIPLE SITES WITH POSITIVE RHEUMATOID FACTOR (HCC): ICD-10-CM

## 2022-02-22 DIAGNOSIS — G10 HUNTINGTON DISEASE (HCC): Primary | ICD-10-CM

## 2022-02-22 DIAGNOSIS — Z12.31 ENCOUNTER FOR SCREENING MAMMOGRAM FOR MALIGNANT NEOPLASM OF BREAST: ICD-10-CM

## 2022-02-22 DIAGNOSIS — E89.0 POSTABLATIVE HYPOTHYROIDISM: ICD-10-CM
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
## 2022-02-22 DIAGNOSIS — Z23 NEED FOR VACCINATION: ICD-10-CM

## 2022-02-22 PROCEDURE — 1036F TOBACCO NON-USER: CPT | Performed by: FAMILY MEDICINE

## 2022-02-22 PROCEDURE — 77063 BREAST TOMOSYNTHESIS BI: CPT

## 2022-02-22 PROCEDURE — 90471 IMMUNIZATION ADMIN: CPT | Performed by: FAMILY MEDICINE

## 2022-02-22 PROCEDURE — 90682 RIV4 VACC RECOMBINANT DNA IM: CPT | Performed by: FAMILY MEDICINE

## 2022-02-22 PROCEDURE — 3008F BODY MASS INDEX DOCD: CPT | Performed by: FAMILY MEDICINE

## 2022-02-22 PROCEDURE — 99214 OFFICE O/P EST MOD 30 MIN: CPT | Performed by: FAMILY MEDICINE

## 2022-02-22 PROCEDURE — 77067 SCR MAMMO BI INCL CAD: CPT

## 2022-02-22 NOTE — TELEPHONE ENCOUNTER
[employer] and spoke with Emily  She confirmed that patient had J&J booster on 1/25/22 and she will fax documentation

## 2022-02-22 NOTE — Clinical Note
Please help obtain confirmation that patient got the COVID booster JEDUARDO on 1/25/ at 10:40 am [facility]

## 2022-02-22 NOTE — PROGRESS NOTES
Ermelinda Klinefelter 1960 female MRN: [id number]    [facility] OFFICE VISIT  [facility] - [address]      ASSESSMENT/PLAN  Ermelinda Klinefelter is a 62 y o  female presents to the office for    Diagnoses and all orders for this visit:    [place]wall disease [facility])  -     Ambulatory Referral to Neurology; Future    Need for vaccination  -     FLUBLOK: influenza vaccine, quadrivalent, recombinant, PF, 0 5 mL    Rheumatoid arthritis involving multiple sites with positive rheumatoid factor (HCC)    Hypercholesterolemia  -     Lipid panel; Future  -     Comprehensive metabolic panel; Future    Postablative hypothyroidism  -     TSH, 3rd generation; Future    Low back pain, unspecified back pain laterality, unspecified chronicity, unspecified whether sciatica present  -     Ambulatory Referral to Physical Therapy; Future    Fall, sequela  -     Ambulatory Referral to Physical Therapy; Future       Neurology referral given to establish care  Rheumatoid arthritis controlled by her rheumatologist   Hypothyroidism reviewed previous blood work  Repeat levels  Low back pain secondary to fall recommend that the patient be seen by her physical therapist   Referral given  Flu vaccine given today    Hyperlipidemia continue medications as prescribed    Falls was mechanical not secondary to Stonewall's         Future Appointments   Date Time Provider Yoon Haider   2/23/2022 11:45 AM [address] [place]  [address]  [facility] [place] [address] [place] [facility] OP   3/17/2022  9:20 AM EM[facility] [place] NURSE 1 EMF [place] None   4/14/2022 10:20 AM Tricia Berry PA-C EMF [place] None   4/14/2022 10:20 AM EM[facility] [place] NURSE 1 EMF [place] None   5/11/2022  9:00 AM EM[facility] [place] NURSE 1 EMF [place] None   6/8/2022  9:20 AM EM[facility] [place] NURSE 1 EMF [place] None   7/6/2022  9:20 AM EMF [place] NURSE 1 EMF [place] None   7/6/2022  9:40 AM Tricia Berry PA-C EMF [place] None   11/17/2022  9:30 AM [place] DEX[place] [address] [facility]          SUBJECTIVE  CC: Follow-up (labs cholesterol) and Fall (discuss physical therapy at home)      HPI:  Jose Knapp is a 62 y o  female who presents for a follow-up on multiple conditions  1/29 Fell and feeling that PT will help adjust her  Didn't hurt head  She feels that this is not secondary to McDuffie's it was just secondary to mechanical fall  She does not want to see a neuro physical therapist she would like to see Dede Cain who is down stairs given that she has relationship in the past with him helping her  Patient is seeing the rheumatologist without any changes  Has been trying to get her prednisone reduced  Patient taking her thyroid medication as prescribed the  Taking her cholesterol medication as prescribed  Debbi Lu works [employer]-> epidural in the back tomorrow  Mammo scheduled today   Review of Systems   Constitutional: Negative for activity change, appetite change, chills, fatigue and fever  HENT: Negative for congestion  Respiratory: Negative for cough, chest tightness and shortness of breath  Cardiovascular: Negative for chest pain and leg swelling  Gastrointestinal: Negative for abdominal distention, abdominal pain, constipation, diarrhea, nausea and vomiting  Musculoskeletal: Positive for arthralgias  All other systems reviewed and are negative        Historical Information   The patient history was reviewed as follows:  Past Medical History:   Diagnosis Date    Allergic rhinitis     Anxiety     Colitis     Colon polyps     + history for colon polyps    Depression     Disease of thyroid gland     Full dentures     GERD (gastroesophageal reflux disease)     Glaucoma     Glaucoma     Herpes gingivostomatitis     Herpes simplex infection     [place]'s [facility]     Hyperlipidemia     Hypertension     new onset 05/2019    Multiple benign polyps of large intestine     Osteopenia     Osteoporosis     RA [facility]rheumatoid arthritis) ([facility]     Sjogren's disease (HCC)          Medications: Current Outpatient Medications:     buPROPion (WELLBUTRIN XL) 300 mg 24 hr tablet, TAKE 1 TABLET BY MOUTH EVERY DAY IN THE MORNING, Disp: 90 tablet, Rfl: 1    Cholecalciferol (VITAMIN D3) 2000 units CHEW, Daily, Disp: , Rfl:     Deutetrabenazine (AUSTEDO) 6 MG TABS, Take 9 mg by mouth daily at bedtime  , Disp: , Rfl:     diclofenac (VOLTAREN) 75 mg EC tablet, Take 1 tablet (75 mg total) by mouth 2 (two) times a day, Disp: 180 tablet, Rfl: 1    Docusate Calcium (STOOL SOFTENER PO), Take 100 mg by mouth daily , Disp: , Rfl:     ergocalciferol (VITAMIN D2) 50,000 units, Take 1 capsule (50,000 Units total) by mouth once a week for 12 doses, Disp: 12 capsule, Rfl: 0    escitalopram (LEXAPRO) 20 mg tablet, TAKE 1 TABLET BY MOUTH EVERY DAY, Disp: 30 tablet, Rfl: 2    famciclovir (FAMVIR) 250 MG tablet, TAKE 1 TABLET BY MOUTH EVERY DAY, Disp: 30 tablet, Rfl: 2    Fexofenadine HCl (ALLEGRA PO), Take by mouth daily as needed  , Disp: , Rfl:     folic acid (FOLVITE) 1 mg tablet, Take 1 mg by mouth daily , Disp: , Rfl:     hydroxychloroquine (Plaquenil) 200 mg tablet, Take 1 tablet (200 mg total) by mouth daily with breakfast, Disp: 90 tablet, Rfl: 1    levothyroxine 125 mcg tablet, TAKE 1 TABLET DAILY, Disp: 90 tablet, Rfl: 2    omeprazole (PriLOSEC) 20 mg delayed release capsule, Take 1 capsule (20 mg total) by mouth daily, Disp: 90 capsule, Rfl: 3    Polyethyl Glycol-Propyl Glycol (SYSTANE OP), Systane (PF) 0 4 %-0 3 % eye drops in a dropperette  TID, Disp: , Rfl:     predniSONE 5 mg tablet, Take 1 tablet (5 mg total) by mouth daily, Disp: 90 tablet, Rfl: 1    simvastatin (ZOCOR) 20 mg tablet, TAKE 1 TABLET BY MOUTH EVERY DAY, Disp: 30 tablet, Rfl: 2    tocilizumab (ACTEMRA) 200 mg/10 mL, Infuse into a venous catheter , Disp: , Rfl:     zoledronic acid (RECLAST) 5 mg/100 mL IV infusion (premix), zoledronic acid 5 mg/100 mL in mannitol 5 %-water intravenous piggybck  yearly, Disp: , Rfl:     Na Sulfate-K Sulfate-Mg Sulf 17 5-3 13-1 6 GM/177ML SOLN, Take 1 applicator by mouth once for 1 dose, Disp: 1 Bottle, Rfl: 0    zoledronic acid (RECLAST) 5 mg/100 mL IV infusion (premix), Infuse 100 mL (5 mg total) into a venous catheter once for 1 dose, Disp: 100 mL, Rfl: 0    zoledronic acid (RECLAST) 5 mg/100 mL IV infusion (premix), Infuse 100 mL (5 mg total) into a venous catheter once for 1 dose, Disp: 100 mL, Rfl: 0    Allergies   Allergen Reactions    Gold Itching    Sulfa Antibiotics Hives    Valacyclovir Palpitations       OBJECTIVE  Vitals:   Vitals:    02/22/22 1145   BP: 128/72   BP Location: Left arm   Patient Position: Sitting   Cuff Size: Standard   Pulse: 72   Resp: 16   Temp: 98 °F (36 7 °C)   TempSrc: Temporal   Weight: 49 9 kg (110 lb)   Height: 4' 11 5" (1 511 m)         Physical Exam  Vitals reviewed  Constitutional:       Appearance: She is well-developed  HENT:      Head: Normocephalic and atraumatic  Eyes:      Conjunctiva/sclera: Conjunctivae normal       Pupils: Pupils are equal, round, and reactive to light  Cardiovascular:      Rate and Rhythm: Normal rate and regular rhythm  Heart sounds: Normal heart sounds  Pulmonary:      Effort: Pulmonary effort is normal  No respiratory distress  Breath sounds: Normal breath sounds  Musculoskeletal:         General: Tenderness (Lower back) present  Cervical back: Normal range of motion and neck supple  Skin:     General: Skin is warm  Capillary Refill: Capillary refill takes less than 2 seconds  Neurological:      Mental Status: She is alert and oriented to person, place, and time                      Cruzito Sparks MD,   [facility]  2/22/2022

## 2022-02-22 NOTE — TELEPHONE ENCOUNTER
----- Message from Joann Parry MD sent at 2/22/2022 12:06 PM EST -----  Please help obtain confirmation that patient got the COVID booster JEDUARDO on 1/25/ at 10:40 am [facility]

## 2022-02-23 ENCOUNTER — EVALUATION (OUTPATIENT)
Dept: PHYSICAL THERAPY | Facility: CLINIC | Age: 62
End: 2022-02-23
Payer: COMMERCIAL
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
## 2022-02-23 DIAGNOSIS — M54.50 LOW BACK PAIN, UNSPECIFIED BACK PAIN LATERALITY, UNSPECIFIED CHRONICITY, UNSPECIFIED WHETHER SCIATICA PRESENT: ICD-10-CM

## 2022-02-23 PROCEDURE — 97161 PT EVAL LOW COMPLEX 20 MIN: CPT | Performed by: PHYSICAL THERAPIST

## 2022-02-23 NOTE — PROGRESS NOTES
PT Evaluation     Today's date: 2022  Patient name: Khoa Hook  : 1960  MRN: [id number]  Referring provider: Pollo Hazel MD  Dx:   Encounter Diagnosis     ICD-10-CM    1  Low back pain, unspecified back pain laterality, unspecified chronicity, unspecified whether sciatica present  M54 50 Ambulatory Referral to Physical Therapy   2  Fall, sequela  W19  XXXS Ambulatory Referral to Physical Therapy                  Assessment  Assessment details: [place] Handleypresents with signs and symptoms consistent with Low back pain, unspecified back pain laterality, unspecified chronicity, unspecified whether sciatica present  Fall, sequela, with loss of range of motion, strength and spinal stabilization  Presents with high reactivity  Khoa Hook would benefit with physical therapy to address these impairments to return to prior level of function  Impairments: abnormal gait, abnormal or restricted ROM, abnormal movement, activity intolerance, impaired balance, impaired physical strength, lacks appropriate home exercise program, pain with function and safety issue    Goals  STG  Initiate HEP  Able to decrease pain by 50% in 3 weeks  LTG  Independent with HEP  Able to decrease pain by 90% in 6 weeks  FOTO > 48 in 6 weeks    Plan  Planned therapy interventions: balance, balance/weight bearing training, neuromuscular re-education, postural training, strengthening, stretching, therapeutic exercise and home exercise program  Frequency: 2x week  Duration in visits: 12  Duration in weeks: 6  Treatment plan discussed with: patient        Subjective Evaluation    History of Present Illness  Mechanism of injury: Patient reports a slip and fall on ice 21, but 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  She reports low back pain for a long time  She wants to try PT to help decrease back pain, but also improve balance            Recurrent probem    Quality of life: good    Pain  Current pain rating: 3  At best pain ratin  At worst pain ratin  Location: low back  Quality: dull ache and sharp  Relieving factors: change in position and rest  Aggravating factors: lifting, standing and walking  Progression: worsening    Treatments  Current treatment: physical therapy  Patient Goals  Patient goals for therapy: increased strength, independence with ADLs/IADLs, return to sport/leisure activities, increased motion, improved balance and decreased pain          Objective    Flowsheet Rows      Most Recent Value   PT/OT G-Codes    Current Score 22   Projected Score 48   FOTO information reviewed Yes   Assessment Type Evaluation   G code set Mobility: Walking & Moving Around             Precautions: Medical History  Diagnosis Date Comment Source   Allergic rhinitis      Anxiety      Colitis      Colon polyps  + history for colon polyps    Depression      Disease of thyroid gland      Full dentures      GERD (gastroesophageal reflux disease)      Glaucoma      Glaucoma      Herpes gingivostomatitis      Herpes simplex infection      [place]'s disease ([facility] )      Hyperlipidemia      Hypertension  new onset 2019    Multiple benign polyps of large intestine      Osteopenia      Osteoporosis      RA (rheumatoid arthritis) ([facility] )      Sjogren's disease ([facility] )              Manuals                                                                 Neuro Re-Ed                                                                                                        Ther Ex             Neurac supine pelvic lift 2x5            Neurac Bridge 2x5                                                                                          Ther Activity                                       Gait Training                                       Modalities

## 2022-02-26 DIAGNOSIS — E03.9 HYPOTHYROIDISM, UNSPECIFIED TYPE: ICD-10-CM

## 2022-02-28 ENCOUNTER — OFFICE VISIT (OUTPATIENT)
Dept: PHYSICAL THERAPY | Facility: CLINIC | Age: 62
End: 2022-02-28
Payer: COMMERCIAL
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
## 2022-02-28 DIAGNOSIS — M54.50 LOW BACK PAIN, UNSPECIFIED BACK PAIN LATERALITY, UNSPECIFIED CHRONICITY, UNSPECIFIED WHETHER SCIATICA PRESENT: Primary | ICD-10-CM

## 2022-02-28 PROCEDURE — 97112 NEUROMUSCULAR REEDUCATION: CPT | Performed by: PHYSICAL THERAPIST

## 2022-02-28 RX ORDER — LEVOTHYROXINE SODIUM 0.12 MG/1
TABLET ORAL
Qty: 90 TABLET | Refills: 2 | Status: SHIPPED | OUTPATIENT
Start: 2022-02-28 | End: 2022-02-28 | Stop reason: SDUPTHER

## 2022-02-28 NOTE — TELEPHONE ENCOUNTER
Patient need a  [facility]day immediate refill to [facility] because she is out , then a 90 day sent [facility] home delivery tc/cma

## 2022-02-28 NOTE — PROGRESS NOTES
Daily Note     Today's date: 2022  Patient name: Derian Robison  : 1960  MRN: [id number]  Referring provider: Steve Castillo MD  Dx:   Encounter Diagnosis     ICD-10-CM    1  Low back pain, unspecified back pain laterality, unspecified chronicity, unspecified whether sciatica present  M54 50    2  Fall, sequela  W19  XXXS                   Subjective: I am slightly sore after the exercise  Objective: See treatment diary below      Assessment: Tolerated treatment well  Patient demonstrated fatigue post treatment and exhibited good technique with therapeutic exercises      Plan: Continue per plan of care        Precautions: Medical History  Diagnosis Date Comment Source   Allergic rhinitis      Anxiety      Colitis      Colon polyps  + history for colon polyps    Depression      Disease of thyroid gland      Full dentures      GERD (gastroesophageal reflux disease)      Glaucoma      Glaucoma      Herpes gingivostomatitis      Herpes simplex infection      [place]'s disease ([facility] )      Hyperlipidemia      Hypertension  new onset 2019    Multiple benign polyps of large intestine      Osteopenia      Osteoporosis      RA (rheumatoid arthritis) (HCC)      Sjogren's disease ([facility] )              Manuals                                                                 Neuro Re-Ed                                                                                                        Ther Ex            Neurac supine pelvic lift 2x5 2x5           Neurac Bridge 2x5 2x5           Neurac SDLY hip ABD/ADD  2x5  2x5             Nustep   5m                                                               Ther Activity                                       Gait Training                                       Modalities

## 2022-03-03 ENCOUNTER — OFFICE VISIT (OUTPATIENT)
Dept: PHYSICAL THERAPY | Facility: CLINIC | Age: 62
End: 2022-03-03
Payer: COMMERCIAL

## 2022-03-03 DIAGNOSIS — M54.50 LOW BACK PAIN, UNSPECIFIED BACK PAIN LATERALITY, UNSPECIFIED CHRONICITY, UNSPECIFIED WHETHER SCIATICA PRESENT: Primary | ICD-10-CM
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
## 2022-03-03 PROCEDURE — 97110 THERAPEUTIC EXERCISES: CPT | Performed by: PHYSICAL THERAPIST

## 2022-03-03 PROCEDURE — 97112 NEUROMUSCULAR REEDUCATION: CPT | Performed by: PHYSICAL THERAPIST

## 2022-03-03 NOTE — PROGRESS NOTES
Daily Note     Today's date: 3/3/2022  Patient name: Edilma Isabel  : 1960  MRN: [id number]  Referring provider: Terese Valdez MD  Dx: No diagnosis found  Subjective: I am more stable walking  Objective: See treatment diary below      Assessment: Tolerated treatment well  Patient demonstrated fatigue post treatment and exhibited good technique with therapeutic exercises      Plan: Continue per plan of care        Precautions: Medical History  Diagnosis Date Comment Source   Allergic rhinitis      Anxiety      Colitis      Colon polyps  + history for colon polyps    Depression      Disease of thyroid gland      Full dentures      GERD (gastroesophageal reflux disease)      Glaucoma      Glaucoma      Herpes gingivostomatitis      Herpes simplex infection      [place]'s disease ([facility] [address] )      Hyperlipidemia      Hypertension  new onset 2019    Multiple benign polyps of large intestine      Osteopenia      Osteoporosis      RA (rheumatoid arthritis) ([facility])      Sjogren's disease ([facility] [address] [address] )              Manuals                                                                 Neuro Re-Ed                                                                                                        Ther Ex  3/3          Neurac supine pelvic lift 2x5 2x5 2x5          Neurac Bridge 2x5 2x5 2x5          Neurac SDLY hip ABD/ADD  2x5  2x5   2x5  2x5          Nustep   5m 10m          Lateral step up   4" 10x          Balance reaching on cushion   2x10                                    Ther Activity                                       Gait Training                                       Modalities Hernia, epigastric    History of transurethral prostatectomy  transurethral needle ablation /TUNA of prostate 4 years ago

## 2022-03-08 ENCOUNTER — OFFICE VISIT (OUTPATIENT)
Dept: PHYSICAL THERAPY | Facility: CLINIC | Age: 62
End: 2022-03-08
Payer: COMMERCIAL
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
## 2022-03-08 DIAGNOSIS — M54.50 LOW BACK PAIN, UNSPECIFIED BACK PAIN LATERALITY, UNSPECIFIED CHRONICITY, UNSPECIFIED WHETHER SCIATICA PRESENT: Primary | ICD-10-CM

## 2022-03-08 PROCEDURE — 97110 THERAPEUTIC EXERCISES: CPT

## 2022-03-08 PROCEDURE — 97112 NEUROMUSCULAR REEDUCATION: CPT

## 2022-03-08 NOTE — PROGRESS NOTES
Daily Note     Today's date: 3/8/2022  Patient name: Cait Huynh  : 1960  MRN: [id number]  Referring provider: Miguelina Almanzar MD  Dx:   Encounter Diagnosis     ICD-10-CM    1  Low back pain, unspecified back pain laterality, unspecified chronicity, unspecified whether sciatica present  M54 50    2  Fall, sequela  W19  XXXS        Start Time: 845  Stop Time: 930  Total time in clinic (min): 45 minutes    Subjective: Pt reports that her back feels good this morning, no pain prior to the start of her treatment session  Pt describes still having pain and difficulty vacuuming and performing other ADL's around the house due to her pain  Objective: See treatment diary below      Assessment: Tolerated treatment well  Patient demonstrated fatigue post treatment, exhibited good technique with therapeutic exercises and would benefit from continued PT  PPT and supine marches were added to address and progress abdominal/core strengthening, as well as LTR and FIS to promote lumbar ROM  Pt tolerated new exercises well, required frequent breaks due to fatigue  Plan: Continue per plan of care  Progress treatment as tolerated         Precautions: Medical History  Diagnosis Date Comment Source   Allergic rhinitis      Anxiety      Colitis      Colon polyps  + history for colon polyps    Depression      Disease of thyroid gland      Full dentures      GERD (gastroesophageal reflux disease)      Glaucoma      Glaucoma      Herpes gingivostomatitis      Herpes simplex infection      Humphreys's disease ([facility] [address] )      Hyperlipidemia      Hypertension  new onset 2019    Multiple benign polyps of large intestine      Osteopenia      Osteoporosis      RA (rheumatoid arthritis) ([facility] [address] )      Sjogren's disease ([facility] [address] )              Manuals    3/8                                                             Neuro Re-Ed             PPT    2x5         TA act w/marches    2x5 B Ther Ex 2/23 2/28 3/3 3/8         Neurac supine pelvic lift 2x5 2x5 2x5          Neurac Bridge 2x5 2x5 2x5 2x5         Neurac SDLY hip ABD/ADD  2x5  2x5   2x5  2x5 3x5 add  2x5 abd supine         Nustep   5m 10m 10 mins         Lateral step up   4" 10x 4" 10x B         Balance reaching on cushion   2x10          LTR    10x10s B         FIS    10x3 ways         Ther Activity                                       Gait Training                                       Modalities

## 2022-03-10 ENCOUNTER — OFFICE VISIT (OUTPATIENT)
Dept: PHYSICAL THERAPY | Facility: CLINIC | Age: 62
End: 2022-03-10
Payer: COMMERCIAL

## 2022-03-10 DIAGNOSIS — M54.50 LOW BACK PAIN, UNSPECIFIED BACK PAIN LATERALITY, UNSPECIFIED CHRONICITY, UNSPECIFIED WHETHER SCIATICA PRESENT: Primary | ICD-10-CM

## 2022-03-10 PROCEDURE — 97110 THERAPEUTIC EXERCISES: CPT

## 2022-03-10 PROCEDURE — 97112 NEUROMUSCULAR REEDUCATION: CPT

## 2022-03-10 NOTE — PROGRESS NOTES
Daily Note     Today's date: 3/10/2022  Patient name: Umu York  : 1960  MRN: [id number]  Referring provider: Venu Hannon MD  Dx:   Encounter Diagnosis     ICD-10-CM    1  Low back pain, unspecified back pain laterality, unspecified chronicity, unspecified whether sciatica present  M54 50        Start Time: 1015          Subjective: "I had an epidural in my lower back yesterday  I go about every 6 months "       Objective: See treatment diary below      Assessment: Tolerated treatment fair  Patient demonstrated fatigue post treatment and would benefit from continued PT      Plan: Progress treatment as tolerated         Precautions: Medical History  Diagnosis Date Comment Source   Allergic rhinitis      Anxiety      Colitis      Colon polyps  + history for colon polyps    Depression      Disease of thyroid gland      Full dentures      GERD (gastroesophageal reflux disease)      Glaucoma      Glaucoma      Herpes gingivostomatitis      Herpes simplex infection      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[address] )      Hyperlipidemia      Hypertension  new onset 2019    Multiple benign polyps of large intestine      Osteopenia      Osteoporosis      RA (rheumatoid arthritis) ([facility])      Sjogren's disease ([facility] [address] )              Manuals    3/8 3/10                                                            Neuro Re-Ed             PPT    2x5 10x 3 sec hold         TA act w/marches    2x5 B (B) 2x5 (tactile cues)                                                                          Ther Ex 2/23 2/28 3/3 3/8         Neurac supine pelvic lift 2x5 2x5 2x5  ----        Neurac Bridge 2x5 2x5 2x5 2x5 bridge 2 x 10 reps w/ hip add        Neurac SDLY hip ABD/ADD  2x5  2x5   2x5  2x5 3x5 add  2x5 abd supine ---        Nustep   5m 10m 10 mins 10 min L1        Lateral step up   4" 10x 4" 10x B 4" (B) 20x        Balance reaching on cushion   2x10  --        LTR    10x10s B 10x 10sec hold         FIS    10x3 ways 10x3 ways        Ther Activity     Supine clamshells RTB  2 x 10 reps                                   Gait Training                                       Modalities

## 2022-03-15 ENCOUNTER — OFFICE VISIT (OUTPATIENT)
Dept: PHYSICAL THERAPY | Facility: CLINIC | Age: 62
End: 2022-03-15
Payer: COMMERCIAL

## 2022-03-15 DIAGNOSIS — M54.50 LOW BACK PAIN, UNSPECIFIED BACK PAIN LATERALITY, UNSPECIFIED CHRONICITY, UNSPECIFIED WHETHER SCIATICA PRESENT: Primary | ICD-10-CM
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
## 2022-03-15 PROCEDURE — 97112 NEUROMUSCULAR REEDUCATION: CPT | Performed by: PHYSICAL THERAPIST

## 2022-03-15 NOTE — PROGRESS NOTES
Daily Note     Today's date: 3/15/2022  Patient name: Radha Harley  : 1960  MRN: [id number]  Referring provider: Neisha Hamilton MD  Dx:   Encounter Diagnosis     ICD-10-CM    1  Low back pain, unspecified back pain laterality, unspecified chronicity, unspecified whether sciatica present  M54 50    2  Fall, sequela  W19  XXXS                   Subjective: Overall my back is much better      Objective: See treatment diary below      Assessment: Tolerated treatment well  Patient demonstrated fatigue post treatment and exhibited good technique with therapeutic exercises      Plan: Continue per plan of care        Precautions: Medical History  Diagnosis Date Comment Source   Allergic rhinitis      Anxiety      Colitis      Colon polyps  + history for colon polyps    Depression      Disease of thyroid gland      Full dentures      GERD (gastroesophageal reflux disease)      Glaucoma      Glaucoma      Herpes gingivostomatitis      Herpes simplex infection      [place]as's disease ([facility] [address] )      Hyperlipidemia      Hypertension  new onset 2019    Multiple benign polyps of large intestine      Osteopenia      Osteoporosis      RA (rheumatoid arthritis) ([facility])      Sjogren's disease ([facility] [address] )              Manuals    3/8 3/10                                                            Neuro Re-Ed             PPT    2x5 10x 3 sec hold         TA act w/marches    2x5 B (B) 2x5 (tactile cues)                                                                          Ther Ex 2/23 2/28 3/3 3/8  3/15       Neurac supine pelvic lift 2x5 2x5 2x5  ---- 2x5       Neurac Bridge 2x5 2x5 2x5 2x5 bridge 2 x 10 reps w/ hip add 2x5       Neurac SDLY hip ABD/ADD  2x5  2x5   2x5  2x5 3x5 add  2x5 abd supine --- 2x5  2x5       Nustep   5m 10m 10 mins 10 min L1 10m       Lateral step up   4" 10x 4" 10x B 4" (B) 20x 4" 20x       Balance reaching on cushion   2x10  -- 2x10       LTR    10x10s B 10x 10sec hold         FIS    10x3 ways 10x3 ways Ther Activity     Supine clamshells RTB  2 x 10 reps                                   Gait Training                                       Modalities

## 2022-03-17 ENCOUNTER — OFFICE VISIT (OUTPATIENT)
Dept: PHYSICAL THERAPY | Facility: CLINIC | Age: 62
End: 2022-03-17
Payer: COMMERCIAL

## 2022-03-17 DIAGNOSIS — M54.50 LOW BACK PAIN, UNSPECIFIED BACK PAIN LATERALITY, UNSPECIFIED CHRONICITY, UNSPECIFIED WHETHER SCIATICA PRESENT: Primary | ICD-10-CM

## 2022-03-17 PROCEDURE — 97112 NEUROMUSCULAR REEDUCATION: CPT | Performed by: PHYSICAL THERAPIST

## 2022-03-22 ENCOUNTER — OFFICE VISIT (OUTPATIENT)
Dept: PHYSICAL THERAPY | Facility: CLINIC | Age: 62
End: 2022-03-22
Payer: COMMERCIAL

## 2022-03-22 DIAGNOSIS — M54.50 LOW BACK PAIN, UNSPECIFIED BACK PAIN LATERALITY, UNSPECIFIED CHRONICITY, UNSPECIFIED WHETHER SCIATICA PRESENT: Primary | ICD-10-CM

## 2022-03-22 DIAGNOSIS — E03.9 HYPOTHYROIDISM, UNSPECIFIED TYPE: ICD-10-CM

## 2022-03-22 PROCEDURE — 97112 NEUROMUSCULAR REEDUCATION: CPT | Performed by: PHYSICAL THERAPIST

## 2022-03-22 RX ORDER — LEVOTHYROXINE SODIUM 0.12 MG/1
TABLET ORAL
Qty: 30 TABLET | Refills: 0 | Status: SHIPPED | OUTPATIENT
Start: 2022-03-22 | End: 2022-06-23

## 2022-03-22 NOTE — TELEPHONE ENCOUNTER
Can we please remind the patient to please get her blood work performed so I am able to fill this for 90 day supply I have no recent thyroid level

## 2022-03-22 NOTE — PROGRESS NOTES
Daily Note     Today's date: 3/22/2022  Patient name: Lm Marc  : 1960  MRN: [id number]  Referring provider: David Carver MD  Dx:   Encounter Diagnosis     ICD-10-CM    1  Low back pain, unspecified back pain laterality, unspecified chronicity, unspecified whether sciatica present  M54 50                   Subjective: I am stronger  Objective: See treatment diary below      Assessment: Tolerated treatment well  Patient demonstrated fatigue post treatment and exhibited good technique with therapeutic exercises      Plan: Continue per plan of care        Precautions: Medical History  Diagnosis Date Comment Source   Allergic rhinitis      Anxiety      Colitis      Colon polyps  + history for colon polyps    Depression      Disease of thyroid gland      Full dentures      GERD (gastroesophageal reflux disease)      Glaucoma      Glaucoma      Herpes gingivostomatitis      Herpes simplex infection      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 [address] )      Sjogren's disease ([facility] [address] )              Manuals    3/8 3/10 3/17                                                           Neuro Re-Ed        3/22     PPT    2x5 10x 3 sec hold    2x5     TA act w/marches    2x5 B (B) 2x5 (tactile cues)    2x5                                                                      Ther Ex 2/23 2/28 3/3 3/8  3/15 3/17      Neurac supine pelvic lift 2x5 2x5 2x5  ---- 2x5 2x5 2x5     Neurac Bridge 2x5 2x5 2x5 2x5 bridge 2 x 10 reps w/ hip add 2x5 2x5 2x5     Neurac SDLY hip ABD/ADD  2x5  2x5   2x5  2x5 3x5 add  2x5 abd supine --- 2x5  2x5 2x5  2x5 2x5  2x5     Nustep   5m 10m 10 mins 10 min L1 10m 10m 15m     Lateral step up   4" 10x 4" 10x B 4" (B) 20x 4" 20x 20x 20x     Balance reaching on cushion   2x10  -- 2x10 20x 20x     LTR    10x10s B 10x 10sec hold         FIS 10x3 ways 10x3 ways        Ther Activity     Supine clamshells RTB  2 x 10 reps                                   Gait Training                                       Modalities

## 2022-03-24 ENCOUNTER — APPOINTMENT (OUTPATIENT)
Dept: PHYSICAL THERAPY | Facility: CLINIC | Age: 62
End: 2022-03-24
Payer: COMMERCIAL

## 2022-03-24 ENCOUNTER — OFFICE VISIT (OUTPATIENT)
Dept: PHYSICAL THERAPY | Facility: CLINIC | Age: 62
End: 2022-03-24
Payer: COMMERCIAL

## 2022-03-24 DIAGNOSIS — M54.50 LOW BACK PAIN, UNSPECIFIED BACK PAIN LATERALITY, UNSPECIFIED CHRONICITY, UNSPECIFIED WHETHER SCIATICA PRESENT: Primary | ICD-10-CM

## 2022-03-24 PROCEDURE — 97110 THERAPEUTIC EXERCISES: CPT | Performed by: PHYSICAL THERAPIST

## 2022-03-24 PROCEDURE — 97112 NEUROMUSCULAR REEDUCATION: CPT | Performed by: PHYSICAL THERAPIST

## 2022-03-24 NOTE — PROGRESS NOTES
Daily Note     Today's date: 3/24/2022  Patient name: Becca Aguilar  : 1960  MRN: [id number]  Referring provider: Jefry Ruiz MD  Dx:   Encounter Diagnosis     ICD-10-CM    1  Low back pain, unspecified back pain laterality, unspecified chronicity, unspecified whether sciatica present  M54 50                   Subjective: My back pain improves after [place]      Objective: See treatment diary below      Assessment: Tolerated treatment well  Patient demonstrated fatigue post treatment and exhibited good technique with therapeutic exercises      Plan: Continue per plan of care        Precautions: Medical History  Diagnosis Date Comment Source   Allergic rhinitis      Anxiety      Colitis      Colon polyps  + history for colon polyps    Depression      Disease of thyroid gland      Full dentures      GERD (gastroesophageal reflux disease)      Glaucoma      Glaucoma      Herpes gingivostomatitis      Herpes simplex infection      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[address] )      Hyperlipidemia      Hypertension  new onset 2019    Multiple benign polyps of large intestine      Osteopenia      Osteoporosis      RA (rheumatoid arthritis) ([facility] [address] )      Sjogren's disease ([facility] [address] )              Manuals    3/8 3/10 3/17                                                           Neuro Re-Ed        3/22 3/24    PPT    2x5 10x 3 sec hold    2x5 2x5    TA act w/marches    2x5 B (B) 2x5 (tactile cues)    2x5 2x5                                                                     Ther Ex 2/23 2/28 3/3 3/8  3/15 3/17      Neurac supine pelvic lift 2x5 2x5 2x5  ---- 2x5 2x5 2x5 2x5    Neurac Bridge 2x5 2x5 2x5 2x5 bridge 2 x 10 reps w/ hip add 2x5 2x5 2x5 2x5    Neurac SDLY hip ABD/ADD  2x5  2x5   2x5  2x5 3x5 add  2x5 abd supine --- 2x5  2x5 2x5  2x5 2x5  2x5 2x5  2x5    Nustep   5m 10m 10 mins 10 min L1 10m 10m 15m 15m    Lateral step up   4" 10x 4" 10x B 4" (B) 20x 4" 20x 20x 20x 20x    Balance reaching on cushion   2x10  -- 2x10 20x 20x 20x    LTR    10x10s B 10x 10sec hold         FIS    10x3 ways 10x3 ways        Ther Activity     Supine clamshells RTB  2 x 10 reps                                   Gait Training                                       Modalities

## 2022-03-26 DIAGNOSIS — F32.A DEPRESSION, UNSPECIFIED DEPRESSION TYPE: ICD-10-CM

## 2022-03-26 DIAGNOSIS — B00.9 HERPES: ICD-10-CM

## 2022-03-26 RX ORDER — BUPROPION HYDROCHLORIDE 300 MG/1
TABLET ORAL
Qty: 90 TABLET | Refills: 5 | Status: SHIPPED | OUTPATIENT
Start: 2022-03-26

## 2022-03-26 RX ORDER — FAMCICLOVIR 250 MG/1
TABLET, FILM COATED ORAL
Qty: 30 TABLET | Refills: 2 | Status: SHIPPED | OUTPATIENT
Start: 2022-03-26 | End: 2022-06-27

## 2022-03-29 ENCOUNTER — OFFICE VISIT (OUTPATIENT)
Dept: PHYSICAL THERAPY | Facility: CLINIC | Age: 62
End: 2022-03-29
Payer: COMMERCIAL
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
## 2022-03-29 DIAGNOSIS — M54.50 LOW BACK PAIN, UNSPECIFIED BACK PAIN LATERALITY, UNSPECIFIED CHRONICITY, UNSPECIFIED WHETHER SCIATICA PRESENT: ICD-10-CM

## 2022-03-29 PROCEDURE — 97112 NEUROMUSCULAR REEDUCATION: CPT | Performed by: PHYSICAL THERAPIST

## 2022-03-29 PROCEDURE — 97110 THERAPEUTIC EXERCISES: CPT | Performed by: PHYSICAL THERAPIST

## 2022-03-29 NOTE — PROGRESS NOTES
Daily Note     Today's date: 3/29/2022  Patient name: Alyssa Davis  : 1960  MRN: [id number]  Referring provider: Matt Martinez MD  Dx:   Encounter Diagnosis     ICD-10-CM    1  Fall, sequela  W19  XXXS    2  Low back pain, unspecified back pain laterality, unspecified chronicity, unspecified whether sciatica present  M54 50                   Subjective: I am very tired      Objective: See treatment diary below      Assessment: Tolerated treatment well  Patient demonstrated fatigue post treatment and exhibited good technique with therapeutic exercises      Plan: Continue per plan of care        Precautions: Medical History  Diagnosis Date Comment Source   Allergic rhinitis      Anxiety      Colitis      Colon polyps  + history for colon polyps    Depression      Disease of thyroid gland      Full dentures      GERD (gastroesophageal reflux disease)      Glaucoma      Glaucoma      Herpes gingivostomatitis      Herpes simplex infection      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[address] )      Hyperlipidemia      Hypertension  new onset 2019    Multiple benign polyps of large intestine      Osteopenia      Osteoporosis      RA (rheumatoid arthritis) ([facility] [address] )      Sjogren's disease ([facility] [address] )              Manuals    3/8 3/10 3/17                                                           Neuro Re-Ed        3/22 3/24 3/29   PPT    2x5 10x 3 sec hold    2x5 2x5    TA act w/marches    2x5 B (B) 2x5 (tactile cues)    2x5 2x5                                                                     Ther Ex 2/23 2/28 3/3 3/8  3/15 3/17      Neurac supine pelvic lift 2x5 2x5 2x5  ---- 2x5 2x5 2x5 2x5 2x5   Neurac Bridge 2x5 2x5 2x5 2x5 bridge 2 x 10 reps w/ hip add 2x5 2x5 2x5 2x5 2x5   Neurac SDLY hip ABD/ADD  2x5  2x5   2x5  2x5 3x5 add  2x5 abd supine --- 2x5  2x5 2x5  2x5 2x5  2x5 2x5  2x5 2x5   Nustep   5m 10m 10 mins 10 min L1 10m 10m 15m 15m 20m   Lateral step up   4" 10x 4" 10x B 4" (B) 20x 4" 20x 20x 20x 20x 20x   Balance reaching on cushion   2x10  -- 2x10 20x 20x 20x 20x   LTR    10x10s B 10x 10sec hold         FIS    10x3 ways 10x3 ways        Ther Activity     Supine clamshells RTB  2 x 10 reps                                   Gait Training                                       Modalities

## 2022-04-06 ENCOUNTER — OFFICE VISIT (OUTPATIENT)
Dept: PHYSICAL THERAPY | Facility: CLINIC | Age: 62
End: 2022-04-06
Payer: COMMERCIAL

## 2022-04-06 DIAGNOSIS — M54.50 LOW BACK PAIN, UNSPECIFIED BACK PAIN LATERALITY, UNSPECIFIED CHRONICITY, UNSPECIFIED WHETHER SCIATICA PRESENT: ICD-10-CM
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
## 2022-04-06 PROCEDURE — 97112 NEUROMUSCULAR REEDUCATION: CPT | Performed by: PHYSICAL THERAPIST

## 2022-04-06 NOTE — PROGRESS NOTES
Daily Note     Today's date: 2022  Patient name: Dolores Cummings  : 1960  MRN: [id number]  Referring provider: Mark Barahona MD  Dx:   Encounter Diagnosis     ICD-10-CM    1  Fall, sequela  W19  XXXS    2  Low back pain, unspecified back pain laterality, unspecified chronicity, unspecified whether sciatica present  M54 50                   Subjective: I am more stable with [place] exercise  Objective: See treatment diary below      Assessment: Tolerated treatment well  Patient demonstrated fatigue post treatment and exhibited good technique with therapeutic exercises      Plan: Continue per plan of care        Precautions: Medical History  Diagnosis Date Comment Source   Allergic rhinitis      Anxiety      Colitis      Colon polyps  + history for colon polyps    Depression      Disease of thyroid gland      Full dentures      GERD (gastroesophageal reflux disease)      Glaucoma      Glaucoma      Herpes gingivostomatitis      Herpes simplex infection      [place]'s disease ([facility] [address] )      Hyperlipidemia      Hypertension  new onset 2019    Multiple benign polyps of large intestine      Osteopenia      Osteoporosis      RA (rheumatoid arthritis) ([facility] [address] )      Sjogren's disease ([facility])            Neurac Daily Treatment Diary     Manual                                                     Exercise Diary         Supine Pelvic Tilt 2x5       Supine Bridging 2x5       Prone Bridging 2x5       Side-lying Hip Abduction 2x5       Side-lying Hip Adduction 2x5                               Nustep 10m L1           Modalities

## 2022-04-12 ENCOUNTER — OFFICE VISIT (OUTPATIENT)
Dept: PHYSICAL THERAPY | Facility: CLINIC | Age: 62
End: 2022-04-12
Payer: COMMERCIAL
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
## 2022-04-12 DIAGNOSIS — M54.50 LOW BACK PAIN, UNSPECIFIED BACK PAIN LATERALITY, UNSPECIFIED CHRONICITY, UNSPECIFIED WHETHER SCIATICA PRESENT: ICD-10-CM

## 2022-04-12 PROCEDURE — 97112 NEUROMUSCULAR REEDUCATION: CPT | Performed by: PHYSICAL THERAPIST

## 2022-04-12 NOTE — PROGRESS NOTES
Daily Note     Today's date: 2022  Patient name: Juwan Wilson  : 1960  MRN: [id number]  Referring provider: Maico Nicholson MD  Dx:   Encounter Diagnosis     ICD-10-CM    1  Fall, sequela  W19  XXXS    2  Low back pain, unspecified back pain laterality, unspecified chronicity, unspecified whether sciatica present  M54 50                   Subjective: I am falling less, more balanced with [place]      Objective: See treatment diary below      Assessment: Tolerated treatment well  Patient demonstrated fatigue post treatment and exhibited good technique with therapeutic exercises      Plan: Continue per plan of care        Precautions: Medical History  Diagnosis Date Comment Source   Allergic rhinitis      Anxiety      Colitis      Colon polyps  + history for colon polyps    Depression      Disease of thyroid gland      Full dentures      GERD (gastroesophageal reflux disease)      Glaucoma      Glaucoma      Herpes gingivostomatitis      Herpes simplex infection      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[facility] [address] )      Hyperlipidemia      Hypertension  new onset 2019    Multiple benign polyps of large intestine      Osteopenia      Osteoporosis      RA (rheumatoid arthritis) ([facility] [address] )      Sjogren's disease (HCC)            Neurac Daily Treatment Diary     Manual                                                     Exercise Diary        Supine Pelvic Tilt 2x5 2x5      Supine Bridging 2x5 2x5      Prone Bridging 2x5 2x5      Side-lying Hip Abduction 2x5 2x5      Side-lying Hip Adduction 2x5 2x5                              Nustep 10m L1 10m L2          Modalities

## 2022-04-14 ENCOUNTER — OFFICE VISIT (OUTPATIENT)
Dept: PHYSICAL THERAPY | Facility: CLINIC | Age: 62
End: 2022-04-14
Payer: COMMERCIAL

## 2022-04-14 DIAGNOSIS — M54.50 LOW BACK PAIN, UNSPECIFIED BACK PAIN LATERALITY, UNSPECIFIED CHRONICITY, UNSPECIFIED WHETHER SCIATICA PRESENT: ICD-10-CM
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
## 2022-04-14 PROCEDURE — 97112 NEUROMUSCULAR REEDUCATION: CPT | Performed by: PHYSICAL THERAPIST

## 2022-04-14 NOTE — PROGRESS NOTES
PT Discharge    Today's date: 2022  Patient name: Gerry Ingram  : 1960  MRN: [id number]  Referring provider: Kale Larry MD  Dx:   Encounter Diagnosis     ICD-10-CM    1  Fall, sequela  W19  XXXS    2  Low back pain, unspecified back pain laterality, unspecified chronicity, unspecified whether sciatica present  M54 50                   Assessment  Assessment details: Gerry Ignram has been seen for Fall, sequela  (primary encounter diagnosis)  Low back pain, unspecified back pain laterality, unspecified chronicity, unspecified whether sciatica present,and has met the goals of physical therapy  And is independent with a HEP  Subjective Evaluation    History of Present Illness  Mechanism of injury: I have no back pain, able to walk, not limited          Objective           Precautions standrd        Neurac Daily Treatment Diary     Manual                                                     Exercise Diary         Supine Pelvic Tilt 2x5       Supine Bridging 2x5       Prone Bridging 2x5       Side-lying Hip Abduction 2x5       Side-lying Hip Adduction 2x5                                           Modalities

## 2022-04-15 ENCOUNTER — OFFICE VISIT (OUTPATIENT)
Dept: FAMILY MEDICINE CLINIC | Facility: CLINIC | Age: 62
End: 2022-04-15
Payer: COMMERCIAL

## 2022-04-15 VITALS
SYSTOLIC BLOOD PRESSURE: 140 MMHG | DIASTOLIC BLOOD PRESSURE: 86 MMHG | HEIGHT: 60 IN | RESPIRATION RATE: 18 BRPM | WEIGHT: 111 LBS | HEART RATE: 78 BPM | OXYGEN SATURATION: 98 % | TEMPERATURE: 97.9 F | BODY MASS INDEX: 21.79 KG/M2

## 2022-04-15 DIAGNOSIS — H10.32 ACUTE BACTERIAL CONJUNCTIVITIS OF LEFT EYE: Primary | ICD-10-CM

## 2022-04-15 PROCEDURE — 99213 OFFICE O/P EST LOW 20 MIN: CPT | Performed by: NURSE PRACTITIONER

## 2022-04-15 PROCEDURE — 3008F BODY MASS INDEX DOCD: CPT | Performed by: NURSE PRACTITIONER

## 2022-04-15 PROCEDURE — 1036F TOBACCO NON-USER: CPT | Performed by: NURSE PRACTITIONER

## 2022-04-15 RX ORDER — TOBRAMYCIN AND DEXAMETHASONE 3; 1 MG/ML; MG/ML
1 SUSPENSION/ DROPS OPHTHALMIC
Qty: 5 ML | Refills: 0 | Status: SHIPPED | OUTPATIENT
Start: 2022-04-15 | End: 2022-04-15 | Stop reason: SDUPTHER

## 2022-04-15 RX ORDER — TOBRAMYCIN AND DEXAMETHASONE 3; 1 MG/ML; MG/ML
1 SUSPENSION/ DROPS OPHTHALMIC
Qty: 5 ML | Refills: 0 | Status: SHIPPED | OUTPATIENT
Start: 2022-04-15

## 2022-04-15 NOTE — PROGRESS NOTES
Assessment:      Acute conjunctivitis      Plan:   The case discussed with patient using patient centered shared decision making  The patient was counseled regarding instructions for management,-- risk factor reductions,-- prognosis,-- impressions,-- risks and benefits of treatment options,-- importance of compliance with treatment  I have reviewed the instructions with the patient, answering all questions to her satisfaction  Discussed the diagnosis and proper care of conjunctivitis  Stressed household hygiene  Ophthalmic drops per orders  Warm compress to eye(s)  Local eye care discussed  Follow up Monday if s/s not better  Subjective:      Kale Art is a 62 y o  female who presents for evaluation of redness and itchiness  She has noticed the above symptoms in the left eye for 1 day  Onset was acute  Symptoms have included discharge and itching  Patient denies blurred vision, foreign body sensation, pain, photophobia and visual field deficit  There is a history of allergies and wearing glasses  The following portions of the patient's history were reviewed and updated as appropriate: allergies, current medications, past family history, past medical history, past social history, past surgical history and problem list     Review of Systems  Pertinent items are noted in HPI  Objective:      /86 (BP Location: Left arm, Patient Position: Sitting, Cuff Size: Standard)   Pulse 78   Temp 97 9 °F (36 6 °C)   Resp 18   Ht 4' 11 5" (1 511 m)   Wt 50 3 kg (111 lb)   SpO2 98%   BMI 22 04 kg/m²             General: alert and oriented, in no acute distress   Eyes:  left sclera injected   scant purulent discharge noted   Vision: Not performed   Fluorescein:  not done

## 2022-04-18 ENCOUNTER — APPOINTMENT (OUTPATIENT)
Dept: PHYSICAL THERAPY | Facility: CLINIC | Age: 62
End: 2022-04-18
Payer: COMMERCIAL

## 2022-04-21 ENCOUNTER — APPOINTMENT (OUTPATIENT)
Dept: PHYSICAL THERAPY | Facility: CLINIC | Age: 62
End: 2022-04-21
Payer: COMMERCIAL

## 2022-04-25 ENCOUNTER — APPOINTMENT (OUTPATIENT)
Dept: PHYSICAL THERAPY | Facility: CLINIC | Age: 62
End: 2022-04-25
Payer: COMMERCIAL

## 2022-04-28 ENCOUNTER — APPOINTMENT (OUTPATIENT)
Dept: PHYSICAL THERAPY | Facility: CLINIC | Age: 62
End: 2022-04-28
Payer: COMMERCIAL

## 2022-06-06 ENCOUNTER — TELEPHONE (OUTPATIENT)
Dept: FAMILY MEDICINE CLINIC | Facility: CLINIC | Age: 62
End: 2022-06-06

## 2022-06-06 NOTE — TELEPHONE ENCOUNTER
Pt's neurologist has retired and consult with new one isn't until 10/5  She is requesting refills on austedo 9mg once at bedtime

## 2022-06-06 NOTE — TELEPHONE ENCOUNTER
D/w pt -  was advised to call her insurance for the list of neurologist and start to call them for early addison

## 2022-06-22 DIAGNOSIS — E03.9 HYPOTHYROIDISM, UNSPECIFIED TYPE: ICD-10-CM

## 2022-06-23 RX ORDER — LEVOTHYROXINE SODIUM 0.12 MG/1
TABLET ORAL
Qty: 30 TABLET | Refills: 0 | Status: SHIPPED | OUTPATIENT
Start: 2022-06-23

## 2022-06-26 DIAGNOSIS — B00.9 HERPES: ICD-10-CM

## 2022-06-27 RX ORDER — FAMCICLOVIR 250 MG/1
TABLET, FILM COATED ORAL
Qty: 30 TABLET | Refills: 0 | Status: SHIPPED | OUTPATIENT
Start: 2022-06-27 | End: 2022-07-25

## 2022-07-24 DIAGNOSIS — B00.9 HERPES: ICD-10-CM

## 2022-07-25 RX ORDER — FAMCICLOVIR 250 MG/1
TABLET, FILM COATED ORAL
Qty: 30 TABLET | Refills: 0 | Status: SHIPPED | OUTPATIENT
Start: 2022-07-25 | End: 2022-08-29

## 2022-08-29 DIAGNOSIS — B00.9 HERPES: ICD-10-CM

## 2022-08-29 RX ORDER — FAMCICLOVIR 250 MG/1
TABLET, FILM COATED ORAL
Qty: 30 TABLET | Refills: 0 | Status: SHIPPED | OUTPATIENT
Start: 2022-08-29 | End: 2022-09-12

## 2022-09-08 DIAGNOSIS — E03.9 HYPOTHYROIDISM, UNSPECIFIED TYPE: ICD-10-CM

## 2022-09-08 RX ORDER — LEVOTHYROXINE SODIUM 0.12 MG/1
TABLET ORAL
Qty: 90 TABLET | Refills: 1 | Status: SHIPPED | OUTPATIENT
Start: 2022-09-08

## 2022-09-12 DIAGNOSIS — B00.9 HERPES: ICD-10-CM

## 2022-09-12 RX ORDER — FAMCICLOVIR 250 MG/1
TABLET, FILM COATED ORAL
Qty: 90 TABLET | Refills: 1 | Status: SHIPPED | OUTPATIENT
Start: 2022-09-12 | End: 2022-10-20

## 2022-10-05 ENCOUNTER — CONSULT (OUTPATIENT)
Dept: NEUROLOGY | Facility: CLINIC | Age: 62
End: 2022-10-05
Payer: COMMERCIAL

## 2022-10-05 VITALS
SYSTOLIC BLOOD PRESSURE: 118 MMHG | BODY MASS INDEX: 22.2 KG/M2 | DIASTOLIC BLOOD PRESSURE: 82 MMHG | WEIGHT: 111.8 LBS | HEART RATE: 89 BPM

## 2022-10-05 DIAGNOSIS — G10 HUNTINGTON DISEASE (HCC): Primary | ICD-10-CM

## 2022-10-05 PROCEDURE — 99205 OFFICE O/P NEW HI 60 MIN: CPT | Performed by: PSYCHIATRY & NEUROLOGY

## 2022-10-05 NOTE — PROGRESS NOTES
Review of Systems   Constitutional: Positive for appetite change (Always hungry) and fatigue  Negative for fever  HENT: Positive for trouble swallowing and voice change  Negative for hearing loss and tinnitus  Eyes: Positive for pain (Ongoing)  Negative for photophobia  Respiratory: Negative  Negative for shortness of breath  Cardiovascular: Negative  Negative for palpitations  Gastrointestinal: Negative  Negative for nausea and vomiting  Endocrine: Negative  Negative for cold intolerance  Genitourinary: Negative  Negative for dysuria, frequency and urgency  Musculoskeletal: Positive for arthralgias (Hands) and gait problem  Negative for myalgias and neck pain  Balance Issues  Spinal Stenosis   Skin: Negative  Negative for rash  Allergic/Immunologic: Negative  Neurological: Negative for dizziness, tremors, seizures, syncope, facial asymmetry, speech difficulty, weakness, light-headedness, numbness and headaches  Hematological: Bruises/bleeds easily (Bruise)  Psychiatric/Behavioral: Negative  Negative for confusion, hallucinations and sleep disturbance  All other systems reviewed and are negative

## 2022-10-05 NOTE — PATIENT INSTRUCTIONS
Chorea affecting ADL's  We will increase Austedo to 6mg bid   If no improvement contact the office and we will further increase to 9mg bid  Contact the office if having side effects  Will refer to PT and OT

## 2022-10-05 NOTE — PROGRESS NOTES
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[facility] )  Anne Marie's disease with moderate chorea which contributes to imbalance and difficulty with hand coordination affecting some finer activities  She previously was on Austedo 9mg qhs with partial improvement but ran out of medication so it was restarted on 6mg qhs  Never on higher or bid dosing  She denies any active depression or side effects  We will increase Austedo to 6mg bid  If no improvement she will contact the office and we will further increase to 9mg bid  Instructed to ontact the office if having side effects    She would benefit from physical therapy to improve gait and balance  She has cognitive symptoms with difficulty multitasking and prioritizing her time  It takes her longer to eat and perform daily tasks in part related to dry mouth due to Sjogren's  Recommended she follow through with medication trial suggested by her rheumatologist for dry mouth  Would benefit from occuptional therapy for exercises to improve coordination/ ADL's and cognitive therapy  Diagnoses and all orders for this visit:    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[facility] )  -     Ambulatory Referral to Neurology  -     Deutetrabenazine 6 MG TABS; Take 6 mg by mouth daily at bedtime  -     Ambulatory Referral to Physical Therapy; Future  -     Ambulatory Referral to Occupational Therapy; Future      Spent 85 minutes on patient visit with cognitive assessment, review of chart, and documentation  Subjective:      Patient is a 62year old with seropositive rheumatoid arthritis, Sjogren's, spinal stenosis, and Hahira's disease, who presents for movement disorder evaluation to establish care  Sister is Lamar Good  She was diagnosed about 9 years ago  They all had been tested at [facility] 9 years ago  She did not fund out result  Symptoms noted at 29years old after the birth of her daughter   She was noted to be physically and mentally "out of control"  At the time she also had thyroid issue and underwent radioactive treatment  She was aware of HD in her family  Around  her and 4 of her sister went to [facility], underwent genetic counseling and got tested  They did not want the results until ready  She requested her results about 9 years ago () which were positive  She has followed at [facility] and with local neurologist Dr Madison Feldman  Father and paternal grandfather had HD ([place] ancestry)  Oldest brother and 3 sisters with no symptoms of HD  Monica Caceres oldest  at 46 with HD and Zoran passed 62  Lianna Corona has HD  She lives with her   Her 22year old daughter is in college and is with them at times  She had a 27year old son  She is having difficulty with normal activities of daily living  It takes longer  She can dress and bathe independently  Her  maintains the home  She attempts to help with simple things  She eats small amount throughout the day  She will choke if she is not paying attention  Sjogren's contribute to her limitations given lack of saliva  She follows with Dr Brad San, rheumatology  She has medications for dry mouth but she has yet to try this  She has glaucoma and was given eye drops and she has yet to try them  She has difficulty organizing her time and tends to spend much of her day eating or doing routine tasks  She keeps herself busy socializing with friends  She has difficulty focusing  She sleeps well and is tired in the day  She is more forgetful  She has difficulty matching socks  She has trouble prioritizing important thing in life  She has imbalance and trips easily  She falls perhaps twice a month  She has chorea which does affect balance  She was on Austedo 9mg nightly with partial improvement  She ran out and PCP restarted at 6mg  She was never on higher doses  She denies any depression, anxiety or compulsive thoughts  She denies any hallucinations  She is on Welbutrin     She had a  evaluation at [facility] but it is unclear when this occurred  Objective:    /82 (BP Location: Left arm, Patient Position: Sitting, Cuff Size: Standard)   Pulse 89   Wt 50 7 kg (111 lb 12 8 oz)   BMI 22 20 kg/m²       Physical Exam  Vitals reviewed  Eyes:      Extraocular Movements: Extraocular movements intact  Pupils: Pupils are equal, round, and reactive to light  Neurological:      Deep Tendon Reflexes: Strength normal    Psychiatric:         Speech: Speech normal          Neurological Exam  Mental Status   Oriented to person, place, time and situation  Recent and remote memory are intact  Memory: Registered 4/5  Recalled  4/5  Unable to copy figure  Clock drawing is normal  Speech is normal  Able to name objects  Follows complex commands  Unable to perform serial calculations  Digit span was 5 forward  MOCA 21  Cranial Nerves  CN II: Right funduscopic exam: not visualized  Left funduscopic exam: not visualized  CN III, IV, VI: Extraocular movements intact bilaterally  Slowed initiation  Pupils equal round and reactive to light bilaterally  CN VII: Full and symmetric facial movement  CN VIII: Hearing is normal   CN IX, X: Palate elevates symmetrically  CN XI: Shoulder shrug strength is normal   CN XII: Tongue midline without atrophy or fasciculations  Motor   Normal muscle tone  Strength is 5/5 throughout all four extremities  Sensory  Light touch is normal in upper and lower extremities  Coordination  Right: Finger-to-nose normal Left: Finger-to-nose normal   See UHDRS    Intermittent moderate truncal , L>R upper chorea  Moderate oral buccal chorea  Gait  Casual gait is normal including stance, stride, and arm swing  Difficulty with tandem with deviations  Able to rise from chair without using arms  Mildly wide based  Good speed  Cuca Friedman DRS Points         Horizontal Pursuit 0 0 - Complete  1 - Jerky  2 - Interrupted/Full Range  3 - Incomp  Range  4 - Cannot pursue   Horizontal Saccade Initiation 1 0 - Normal  1 - Inc Latency  2 - Suppr   Head mvment / blinks  3 - Not suppressibl  4 - Cant initiate   Horizontal Saccade Velocity 0 0 - Normal  1 - Mild slow   2 - Mod slow  3 - Sev slow  4 - Cant initiate   Vertical Pursuit 0    Vertical Saccade Initiation 1    Vertical Saccade Velocity 0    Dysarthria 0 0 - Normal  1 - Unclear  2 - must repeat  3 - Most incompre  4 - Mute   Tongue Protrusion 1 0 - Normal  1 - < 10 sec  2 - < 5 sec  3 - Can't full protrude  4 - not beyond lips   Finger Taps (R) 0 0 - Normal (15/5 sec)  1 - Mild slow  2 - Mod impair (7-10/15 sec)   3 - Severe impair, arrests  4 - Can't perform   Finger Taps (L)  0    Pronate-Supinate (R) 0 0 - Normal  1- Mild slow / irregular  2 - Mod slow + irregular  3 - Severe slow + irregular  4 - Can't perform   Pronate-Supinate (L) 0    Fist Hand Palm  0 - >4 in 10 sec without cue  1 - < 1 in 10 sec without cue  2 - > 4 in 10 sec with cues  3 - < 4 in 10 sec with cues  4 - Can't perform   Rigidity Arm (R) 0 0 - Absent  1 - slight or only with activation  2 - mild / mod  3 - severe, full ROM  4 - Severe with limited range   Rigidity Arm (L) 0    Bradykinesia 0 0 - Absent  1 - Min Slow  2 - Mild clearly slow  3 - Mod slow  4 - marked slowing, long delays   Max Dystonia (Trunk) 0 0 - Absent  1 - Slight / intermittent   2- Mild/common or mod/intermittent  3- Mod / Common  4- Marked / prolonged   Max Dystonia (RUE) 0    Max Dystonia (LUE) 1    Max Dystonia (RLE) 0    Max Dystonia (LLE) 0    Max Chorea (Face) 0 0 - Absent  1 - Slight / intermittent   2- Mild/common or mod/intermittent  3- Mod / Common  4- Marked / prolonged   Max Chorea (EH) 2    Max Chorea (Trunk) 2    Max Chorea (RUE) 2    Max Chorea (LUE) 2    Max Chorea (RLE) 1    Max Chorea (LLE) 1    Gait 1 0 - Normal narrow base  1- Wide base and/or slow  2 - Wide base, walks with difficulty  3 - Walks with assistance  4 - Can't attempt   Tandem Walking 2 0 - Normal for 10 step  1- 1-3 dev  2 - >3 dev  3 - Can't complete  4 - Can't attempt   Pull Test Retropulsion      0      0 - Normal  1- Recovers spontaneously  2- Would fall if not caught  3- Falls Spontaneously  4- Can't stand              124             Current Outpatient Medications on File Prior to Visit   Medication Sig Dispense Refill    buPROPion (WELLBUTRIN XL) 300 mg 24 hr tablet TAKE 1 TABLET BY MOUTH EVERY DAY IN THE MORNING 90 tablet 5    Cholecalciferol (VITAMIN D3) 2000 units CHEW Daily      diclofenac (VOLTAREN) 75 mg EC tablet Take 1 tablet (75 mg total) by mouth 2 (two) times a day 180 tablet 1    Docusate Calcium (STOOL SOFTENER PO) Take 100 mg by mouth daily       escitalopram (LEXAPRO) 20 mg tablet TAKE 1 TABLET BY MOUTH EVERY DAY 90 tablet 2    famciclovir (FAMVIR) 250 MG tablet TAKE 1 TABLET BY MOUTH EVERY DAY 90 tablet 1    Fexofenadine HCl (ALLEGRA PO) Take by mouth daily as needed        folic acid (FOLVITE) 1 mg tablet Take 1 mg by mouth daily       hydroxychloroquine (Plaquenil) 200 mg tablet Take 1 tablet (200 mg total) by mouth daily with breakfast 90 tablet 1    levothyroxine 125 mcg tablet TAKE 1 TABLET BY MOUTH EVERY DAY 90 tablet 1    omeprazole (PriLOSEC) 20 mg delayed release capsule Take 1 capsule (20 mg total) by mouth daily 90 capsule 3    Polyethyl Glycol-Propyl Glycol (SYSTANE OP) Systane (PF) 0 4 %-0 3 % eye drops in a dropperette   TID      predniSONE 5 mg tablet Take 1 tablet (5 mg total) by mouth daily 90 tablet 1    simvastatin (ZOCOR) 20 mg tablet TAKE 1 TABLET BY MOUTH EVERY DAY 90 tablet 2    tobramycin-dexamethasone (TOBRADEX) ophthalmic suspension Administer 1 drop into the left eye every 4 (four) hours while awake 5 mL 0    tocilizumab (Actemra) 200 mg/10 mL Infuse 8 mg/kg (402 8 mg) IV every 4 weeks;  Use in combination with 80 mg vials 10 mL 6    tocilizumab (Actemra) 80 mg/4 mL Inject 4 mL (80 mg total) into a catheter in a vein every 28 days Infuse 8 mg/kg (402 8 mg) IV every 4 weeks; use in combination with 200 mg vial 12 mL 6    zoledronic acid (RECLAST) 5 mg/100 mL IV infusion (premix) zoledronic acid 5 mg/100 mL in mannitol 5 %-water intravenous piggybck   yearly      [DISCONTINUED] Deutetrabenazine 6 MG TABS Take 6 mg by mouth daily at bedtime      ergocalciferol (VITAMIN D2) 50,000 units Take 1 capsule (50,000 Units total) by mouth once a week for 12 doses 12 capsule 0    Na Sulfate-K Sulfate-Mg Sulf 17 5-3 13-1 6 [id number] SOLN Take 1 applicator by mouth once for 1 dose 1 Bottle 0    zoledronic acid (RECLAST) 5 mg/100 mL IV infusion (premix) Infuse 100 mL (5 mg total) into a venous catheter once for 1 dose 100 mL 0    zoledronic acid (RECLAST) 5 mg/100 mL IV infusion (premix) Infuse 100 mL (5 mg total) into a venous catheter once for 1 dose 100 mL 0     No current facility-administered medications on file prior to visit           Eddie Carrasco MD  Movement disorder physician  [address] [facility]

## 2022-10-05 NOTE — ASSESSMENT & PLAN NOTE
Palo's disease with moderate chorea which contributes to imbalance and difficulty with hand coordination affecting some finer activities  She previously was on Austedo 9mg qhs with partial improvement but ran out of medication so it was restarted on 6mg qhs  Never on higher or bid dosing  She denies any active depression or side effects  We will increase Austedo to 6mg bid  If no improvement she will contact the office and we will further increase to 9mg bid  Instructed to ontact the office if having side effects    She would benefit from physical therapy to improve gait and balance  She has cognitive symptoms with difficulty multitasking and prioritizing her time  It takes her longer to eat and perform daily tasks in part related to dry mouth due to Sjogren's  Recommended she follow through with medication trial suggested by her rheumatologist for dry mouth  Would benefit from occuptional therapy for exercises to improve coordination/ ADL's and cognitive therapy

## 2022-10-11 DIAGNOSIS — G10 HUNTINGTON DISEASE (HCC): ICD-10-CM

## 2022-10-11 NOTE — TELEPHONE ENCOUNTER
Pt left VM  Stated that during last office visit on 10/5 instructed to increased Austedo to 6 mg po BID  Script for Austedo sent on 10/5 for 6 mg at HS  Pt is requesting a new script to be sent for the increased dose    Script pended sign if agreeable

## 2022-10-12 PROBLEM — Z12.11 ENCOUNTER FOR SCREENING FOR MALIGNANT NEOPLASM OF COLON: Status: RESOLVED | Noted: 2021-06-13 | Resolved: 2022-10-12

## 2022-10-12 PROBLEM — Z12.11 COLON CANCER SCREENING: Status: RESOLVED | Noted: 2020-12-09 | Resolved: 2022-10-12

## 2022-10-20 ENCOUNTER — OFFICE VISIT (OUTPATIENT)
Dept: FAMILY MEDICINE CLINIC | Facility: CLINIC | Age: 62
End: 2022-10-20
Payer: COMMERCIAL

## 2022-10-20 VITALS
OXYGEN SATURATION: 96 % | HEART RATE: 86 BPM | TEMPERATURE: 98.3 F | BODY MASS INDEX: 21.99 KG/M2 | DIASTOLIC BLOOD PRESSURE: 80 MMHG | HEIGHT: 60 IN | SYSTOLIC BLOOD PRESSURE: 130 MMHG | RESPIRATION RATE: 14 BRPM | WEIGHT: 112 LBS

## 2022-10-20 DIAGNOSIS — F32.A DEPRESSION, UNSPECIFIED DEPRESSION TYPE: ICD-10-CM

## 2022-10-20 DIAGNOSIS — E78.00 HYPERCHOLESTEROLEMIA: ICD-10-CM

## 2022-10-20 DIAGNOSIS — E89.0 POSTABLATIVE HYPOTHYROIDISM: ICD-10-CM

## 2022-10-20 DIAGNOSIS — Z23 ENCOUNTER FOR ADMINISTRATION OF VACCINE: ICD-10-CM

## 2022-10-20 DIAGNOSIS — G10 HUNTINGTON DISEASE (HCC): ICD-10-CM

## 2022-10-20 DIAGNOSIS — Z00.00 ANNUAL PHYSICAL EXAM: Primary | ICD-10-CM

## 2022-10-20 DIAGNOSIS — H40.9 GLAUCOMA OF BOTH EYES, UNSPECIFIED GLAUCOMA TYPE: ICD-10-CM

## 2022-10-20 PROCEDURE — 90471 IMMUNIZATION ADMIN: CPT | Performed by: FAMILY MEDICINE

## 2022-10-20 PROCEDURE — 99396 PREV VISIT EST AGE 40-64: CPT | Performed by: FAMILY MEDICINE

## 2022-10-20 PROCEDURE — 90682 RIV4 VACC RECOMBINANT DNA IM: CPT | Performed by: FAMILY MEDICINE

## 2022-10-20 NOTE — PROGRESS NOTES
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[facility] )       Other    Glaucoma    Hypercholesterolemia    Relevant Orders    Lipid panel      Other Visit Diagnoses     Annual physical exam    -  Primary    Encounter for administration of vaccine        Relevant Orders    influenza vaccine, quadrivalent, recombinant, PF, 0 5 mL, for patients 18 yr+ (FLUBLOK) (Completed)    Depression, unspecified depression type          Hypothyroidism:  Repeat TSH continue on current dosage  Hyperlipidemia:  Continue statin repeat lipid panel  Reviewed recent CMP  Due for repeat vitamin-D  Anne Marie seeing all specialists  Glaucoma seeing specialist  Recommend no increase in her Lexapro given that she is at the maximum dose for her weight  If the depression continues to be present recommend contacting our office  Immunizations and preventive care screenings were discussed with patient today  Appropriate education was printed on patient's after visit summary  No follow-ups on file  Chief Complaint:     Chief Complaint   Patient presents with   • Physical Exam      History of Present Illness:     Adult Annual Physical   Patient here for a comprehensive physical exam   Patient states that her depression has been controlled  She would like an adjustment if medications if possible  She says that the depression is seasonal   She seeing specialist for Alpine's just recently had some medication adjustments  Will be establishing with physical therapy across the street  Patient states that she is taking all her medications compliantly without any issues        Diet and Physical Activity  Diet/Nutrition: well balanced diet  Exercise: walking  Depression Screening  PHQ-2/9 Depression Screening    Little interest or pleasure in doing things: 0 - not at all  Feeling down, depressed, or hopeless: 0 - not at all  Trouble falling or staying asleep, or sleeping too much: 0 - not at all  Feeling tired or having little energy: 1 - several days  Poor appetite or overeatin - not at all  Feeling bad about yourself - or that you are a failure or have let yourself or your family down: 0 - not at all  Trouble concentrating on things, such as reading the newspaper or watching television: 3 - nearly every day  Moving or speaking so slowly that other people could have noticed  Or the opposite - being so fidgety or restless that you have been moving around a lot more than usual: 3 - nearly every day  Thoughts that you would be better off dead, or of hurting yourself in some way: 0 - not at all  PHQ-9 Score: 7   PHQ-9 Interpretation: Mild depression        General Health  Sleep: sleeps well  Hearing: normal - bilateral   Vision: Seeing eye doctor regulary  Dental: regular dental visits  Forgot dentures  Review of Systems:     Review of Systems   Constitutional: Negative for activity change, appetite change, chills, fatigue and fever  HENT: Negative for congestion  Respiratory: Negative for cough, chest tightness and shortness of breath  Cardiovascular: Negative for chest pain and leg swelling  Gastrointestinal: Negative for abdominal distention, abdominal pain, constipation, diarrhea, nausea and vomiting  All other systems reviewed and are negative       Past Medical History:     Past Medical History:   Diagnosis Date   • Allergic rhinitis    • Anxiety    • Colitis    • Colon polyps     + history for colon polyps   • Depression    • Disease of thyroid gland    • Full dentures    • GERD (gastroesophageal reflux disease)    • Glaucoma    • Glaucoma    • Herpes gingivostomatitis • Herpes simplex infection    • 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[facility] )    • Hyperlipidemia    • Hypertension     new onset 05/2019   • Multiple benign polyps of large intestine    • Osteoarthritis    • Osteopenia    • Osteoporosis    • RA (rheumatoid arthritis) ([facility] )    • Sjogren's disease ([facility] )       Past Surgical History:     Past Surgical History:   Procedure Laterality Date   • COLONOSCOPY     • DENTAL SURGERY      upper and lower extractions   • ESOPHAGOGASTRODUODENOSCOPY      DIAGNOSTIC   • INCONTINENCE SURGERY      LAPAROSCOPIC SLING OPERATION FOR STRESS INCONTINENCE    • UPPER GASTROINTESTINAL ENDOSCOPY        Social History:     Social History     Socioeconomic History   • Marital status: /Civil Union     Spouse name: None   • Number of children: None   • Years of education: None   • Highest education level: None   Occupational History   • None   Tobacco Use   • Smoking status: Never Smoker   • Smokeless tobacco: Never Used   Substance and Sexual Activity   • Alcohol use: Not Currently     Comment: SOCIAL    • Drug use: Never   • Sexual activity: None   Other Topics Concern   • None   Social History Narrative    Daily coffee consumption (2 cups/ day)     Social Determinants of Health     Financial Resource Strain: Not on file   Food Insecurity: Not on file   Transportation Needs: Not on file   Physical Activity: Not on file   Stress: Not on file   Social Connections: Not on file   Intimate Partner Violence: Not on file   Housing Stability: Not on file      Family History:     Family History   Problem Relation Age of Onset   • Goodman's disease Father    • Alcohol abuse Father    • Coronary artery disease Family    • Hypertension Mother    • Diabetes Mother    • Meniere's disease Brother    • Lupus Daughter    • Immunodeficiency Son    • Diabetes Paternal Grandmother    • Heart disease Paternal Grandmother    • No Known Problems Sister    • No Known Problems Sister    • No Known Problems Sister    • Meniere's disease Brother    • No Known Problems Maternal Aunt    • No Known Problems Maternal Aunt       Current Medications:     Current Outpatient Medications   Medication Sig Dispense Refill   • buPROPion (WELLBUTRIN XL) 300 mg 24 hr tablet TAKE 1 TABLET BY MOUTH EVERY DAY IN THE MORNING 90 tablet 5   • Cholecalciferol (VITAMIN D3) 2000 units CHEW Daily     • Deutetrabenazine 6 MG TABS Take 6 mg by mouth 2 (two) times a day 180 tablet 1   • diclofenac (VOLTAREN) 75 mg EC tablet Take 1 tablet (75 mg total) by mouth 2 (two) times a day 180 tablet 1   • Docusate Calcium (STOOL SOFTENER PO) Take 100 mg by mouth daily      • ergocalciferol (VITAMIN D2) 50,000 units Take 1 capsule (50,000 Units total) by mouth once a week for 12 doses 12 capsule 0   • escitalopram (LEXAPRO) 20 mg tablet TAKE 1 TABLET BY MOUTH EVERY DAY 90 tablet 2   • famciclovir (FAMVIR) 250 MG tablet TAKE 1 TABLET BY MOUTH EVERY DAY 90 tablet 1   • Fexofenadine HCl (ALLEGRA PO) Take by mouth daily as needed       • folic acid (FOLVITE) 1 mg tablet Take 1 mg by mouth daily      • hydroxychloroquine (Plaquenil) 200 mg tablet Take 1 tablet (200 mg total) by mouth daily with breakfast 90 tablet 1   • levothyroxine 125 mcg tablet TAKE 1 TABLET BY MOUTH EVERY DAY 90 tablet 1   • omeprazole (PriLOSEC) 20 mg delayed release capsule Take 1 capsule (20 mg total) by mouth daily 90 capsule 3   • Polyethyl Glycol-Propyl Glycol (SYSTANE OP) Systane (PF) 0 4 %-0 3 % eye drops in a dropperette   TID     • predniSONE 5 mg tablet Take 1 tablet (5 mg total) by mouth daily 90 tablet 1   • simvastatin (ZOCOR) 20 mg tablet TAKE 1 TABLET BY MOUTH EVERY DAY 90 tablet 2   • tocilizumab (Actemra) 200 mg/10 mL Infuse 8 mg/kg (402 8 mg) IV every 4 weeks;  Use in combination with 80 mg vials 10 mL 6   • tocilizumab (Actemra) 80 mg/4 mL Inject 4 mL (80 mg total) into a catheter in a vein every 28 days Infuse 8 mg/kg (402 8 mg) IV every 4 weeks; use in combination with 200 mg vial 12 mL 6     No current facility-administered medications for this visit  Allergies: Allergies   Allergen Reactions   • Gold Itching   • Sulfa Antibiotics Hives   • Valacyclovir Palpitations      Physical Exam:     /80 (BP Location: Left arm, Patient Position: Sitting, Cuff Size: Standard)   Pulse 86   Temp 98 3 °F (36 8 °C)   Resp 14   Ht 4' 11 5" (1 511 m)   Wt 50 8 kg (112 lb)   SpO2 96%   BMI 22 24 kg/m²     Physical Exam  Vitals reviewed  Constitutional:       Appearance: Normal appearance  She is well-developed  HENT:      Head: Normocephalic and atraumatic  Right Ear: Tympanic membrane, ear canal and external ear normal  There is no impacted cerumen  Left Ear: Tympanic membrane, ear canal and external ear normal  There is no impacted cerumen  Nose: Nose normal       Mouth/Throat:      Mouth: Mucous membranes are moist       Pharynx: Oropharynx is clear  Eyes:      Conjunctiva/sclera: Conjunctivae normal       Pupils: Pupils are equal, round, and reactive to light  Cardiovascular:      Rate and Rhythm: Normal rate and regular rhythm  Heart sounds: Normal heart sounds  Pulmonary:      Effort: Pulmonary effort is normal       Breath sounds: Normal breath sounds  Abdominal:      General: Abdomen is flat  Bowel sounds are normal       Palpations: Abdomen is soft  Musculoskeletal:         General: Normal range of motion  Cervical back: Normal range of motion and neck supple  Skin:     General: Skin is warm  Capillary Refill: Capillary refill takes less than 2 seconds  Neurological:      General: No focal deficit present  Mental Status: She is alert and oriented to person, place, and time  Mental status is at baseline  Psychiatric:         Mood and Affect: Mood normal          Behavior: Behavior normal          Thought Content:  Thought content normal          Judgment: Judgment normal           Perry Ching MD  [facility]

## 2022-10-20 NOTE — PATIENT INSTRUCTIONS

## 2022-11-01 ENCOUNTER — TELEPHONE (OUTPATIENT)
Dept: NEUROLOGY | Facility: CLINIC | Age: 62
End: 2022-11-01

## 2022-11-01 NOTE — TELEPHONE ENCOUNTER
Fax received from [facility] with physical therapy evaluation for provider  Scanned into media

## 2022-11-11 ENCOUNTER — TELEPHONE (OUTPATIENT)
Dept: NEUROLOGY | Facility: CLINIC | Age: 62
End: 2022-11-11

## 2022-11-11 NOTE — TELEPHONE ENCOUNTER
Received fax stating that PA required for Austedo  Anguiano: [id number]    PA submitted via CMM  Awaiting determination

## 2022-11-16 NOTE — TELEPHONE ENCOUNTER
PA for Austedo approved  PA Case: [id number], Status: Approved, Coverage Starts on: 11/14/2022 12:00:00 AM, Coverage Ends on: 5/13/2023 12:00:00 AM    Meaghan da silva,spoke to [place] and made her aware of approval of Austedo

## 2022-11-17 ENCOUNTER — HOSPITAL ENCOUNTER (OUTPATIENT)
Dept: RADIOLOGY | Facility: HOSPITAL | Age: 62
Discharge: HOME | End: 2022-11-17

## 2022-11-17 DIAGNOSIS — M81.0 SENILE OSTEOPOROSIS: ICD-10-CM

## 2022-12-19 DIAGNOSIS — Z12.11 COLON CANCER SCREENING: ICD-10-CM

## 2022-12-19 RX ORDER — OMEPRAZOLE 20 MG/1
20 CAPSULE, DELAYED RELEASE ORAL DAILY
Qty: 90 CAPSULE | Refills: 3 | Status: SHIPPED | OUTPATIENT
Start: 2022-12-19

## 2023-01-04 ENCOUNTER — OFFICE VISIT (OUTPATIENT)
Dept: GASTROENTEROLOGY | Facility: CLINIC | Age: 63
End: 2023-01-04

## 2023-01-04 VITALS
WEIGHT: 108.2 LBS | DIASTOLIC BLOOD PRESSURE: 84 MMHG | OXYGEN SATURATION: 100 % | HEIGHT: 59 IN | RESPIRATION RATE: 18 BRPM | HEART RATE: 70 BPM | SYSTOLIC BLOOD PRESSURE: 140 MMHG | BODY MASS INDEX: 21.81 KG/M2

## 2023-01-04 DIAGNOSIS — K21.9 GASTROESOPHAGEAL REFLUX DISEASE, UNSPECIFIED WHETHER ESOPHAGITIS PRESENT: Primary | ICD-10-CM

## 2023-01-04 DIAGNOSIS — Z12.11 COLON CANCER SCREENING: ICD-10-CM

## 2023-01-04 RX ORDER — OMEPRAZOLE 20 MG/1
20 CAPSULE, DELAYED RELEASE ORAL DAILY
Qty: 90 CAPSULE | Refills: 3 | Status: SHIPPED | OUTPATIENT
Start: 2023-01-04

## 2023-01-04 RX ORDER — OMEPRAZOLE 20 MG/1
20 CAPSULE, DELAYED RELEASE ORAL DAILY
Qty: 90 CAPSULE | Refills: 3 | Status: SHIPPED | OUTPATIENT
Start: 2023-01-04 | End: 2023-01-04

## 2023-01-04 NOTE — PROGRESS NOTES
[facility] - Outpatient Follow-up Note  Becca Aguilar 65 y o  female MRN: [id number]  Encounter: [id number]          ASSESSMENT AND PLAN:      1  GERD  Patient doing well on Prilosec with controlled reflux symptoms  Last EGD 2019 was unremarkable and no repeat recommended unless alarm symptoms     -Continue Prilosec 20 mg daily  -Medication was sent to new pharmacy  If any problems advised patient to reach out to us  - Continue to avoid any reflux trigger foods including spicy food, fatty food, tomato-based products, citrus, carbonation  2  History of colon polyps  Colonoscopy 5/2019 which was unremarkable and repeat recommended in 5 years due to history of polyps  -Repeat colonoscopy will be due 5/2024   -Consider risk versus benefits of proceeding with repeat colonoscopy based on disease progression of Montrose's  Follow up in 1 year or earlier if needed  ______________________________________________________________________    SUBJECTIVE:      Becca Aguilar is a 81-year-old female with history of GERD, hypothyroidism, hypertension, Montrose's disease, rheumatoid arthritis, hyperlipidemia, Sjogren's syndrome who presents the office for follow-up  Patient was last in the office 12/2021 for reflux  She was doing well on omeprazole 20 mg daily    Last EGD and colonoscopy 5/2019  She had small hiatal hernia, patchy gastritis, mild duodenitis  Biopsies were negative for H  pylori, intestinal metaplasia and celiac disease  No repeat EGD was recommended  Colonoscopy at that time with diverticulosis and hemorrhoids and she was recommended repeat colonoscopy in 5 years due to history of polyps  Patient is doing well on Prilosec  She has no reflux symptoms  She has occasional dysphagia due to Sjogren's and Montrose's which she is able to control with taking time when eating, alternating solids and liquids  Lab work a few months ago with normal CBC  CMP also unremarkable  Patient has plans to hopefully go to [place] within the next year due to being at [facility] and hopes to get good nursing home depending on procession of her [place] disease  REVIEW OF SYSTEMS IS OTHERWISE NEGATIVE        Historical Information   Past Medical History:   Diagnosis Date   • Allergic rhinitis    • Anxiety    • Colitis    • Colon polyps     + history for colon polyps   • Depression    • Disease of thyroid gland    • Full dentures    • GERD (gastroesophageal reflux disease)    • Glaucoma    • Glaucoma    • Herpes gingivostomatitis    • Herpes simplex infection    • [place]'s disease ([facility] )    • Hyperlipidemia    • Hypertension     new onset 05/2019   • Multiple benign polyps of large intestine    • Osteoarthritis    • Osteopenia    • Osteoporosis    • RA (rheumatoid arthritis) ([facility] )    • S[facility]og[facility]'s disease ([facility] )      Past Surgical History:   Procedure Laterality Date   • COLONOSCOPY     • DENTAL SURGERY      upper and lower extractions   • ESOPHAGOGASTRODUODENOSCOPY      DIAGNOSTIC   • INCONTINENCE SURGERY      LAPAROSCOPIC SLING OPERATION FOR STRESS INCONTINENCE    • UPPER GASTROINTESTINAL ENDOSCOPY       Social History   Social History     Substance and Sexual Activity   Alcohol Use Not Currently    Comment: SOCIAL      Social History     Substance and Sexual Activity   Drug Use Never     Social History     Tobacco Use   Smoking Status Never   Smokeless Tobacco Never     Family History   Problem Relation Age of Onset   • Anne Marie's disease Father    • Alcohol abuse Father    • Coronary artery disease Family    • Hypertension Mother    • Diabetes Mother    • Meniere's disease Brother    • Lupus Daughter    • Immunodeficiency Son    • Diabetes Paternal Grandmother    • Heart disease Paternal Grandmother    • No Known Problems Sister    • No Known Problems Sister    • No Known Problems Sister    • Meniere's disease Brother    • No Known Problems Maternal Aunt    • No Known Problems Maternal Aunt        Meds/Allergies       Current Outpatient Medications:   •  buPROPion (WELLBUTRIN XL) 300 mg 24 hr tablet  •  Cholecalciferol (VITAMIN D3) 2000 units CHEW  •  Deutetrabenazine 6 MG TABS  •  diclofenac (VOLTAREN) 75 mg EC tablet  •  Docusate Calcium (STOOL SOFTENER PO)  •  escitalopram (LEXAPRO) 20 mg tablet  •  famciclovir (FAMVIR) 250 MG tablet  •  Fexofenadine HCl (ALLEGRA PO)  •  folic acid (FOLVITE) 1 mg tablet  •  hydroxychloroquine (Plaquenil) 200 mg tablet  •  levothyroxine 125 mcg tablet  •  omeprazole (PriLOSEC) 20 mg delayed release capsule  •  predniSONE 5 mg tablet  •  simvastatin (ZOCOR) 20 mg tablet  •  tocilizumab (Actemra) 200 mg/10 mL  •  tocilizumab (Actemra) 80 mg/4 mL  •  ergocalciferol (VITAMIN D2) 50,000 units  •  Polyethyl Glycol-Propyl Glycol (SYSTANE OP)    Allergies   Allergen Reactions   • Gold Itching   • Sulfa Antibiotics Hives   • Valacyclovir Palpitations           Objective     Blood pressure 140/84, pulse 70, resp  rate 18, height 4' 11" (1 499 m), weight 49 1 kg (108 lb 3 2 oz), SpO2 100 %  Body mass index is 21 85 kg/m²  PHYSICAL EXAM:      General Appearance:   Alert, cooperative, no distress   HEENT:   Normocephalic, atraumatic, anicteric      Neck:  Supple, symmetrical, trachea midline   Lungs:   Clear to auscultation bilaterally; no rales, rhonchi or wheezing; respirations unlabored    Heart[de-identified]   Regular rate and rhythm; no murmur, rub, or gallop  Abdomen:   Soft, non-tender, non-distended; normal bowel sounds; no masses, no organomegaly    Genitalia:   Deferred    Rectal:   Deferred    Extremities:  No cyanosis, clubbing or edema    Pulses:  2+ and symmetric    Skin:  No jaundice, rashes, or lesions    Lymph nodes:  No palpable cervical lymphadenopathy        Lab Results:   No visits with results within 1 Day(s) from this visit     Latest known visit with results is:   Orders Only on 09/12/2022   Component Date Value   • White Blood Cell Count 09/12/2022 4 5    • Red Blood Cell Count 09/12/2022 4 38    • Hemoglobin 09/12/2022 13 3    • HCT 09/12/2022 38 3    • MCV 09/12/2022 87    • MCH 09/12/2022 30 4    • MCHC 09/12/2022 34 7    • RDW 09/12/2022 12 5    • Platelet Count 59/30/6173 218    • Neutrophils 09/12/2022 58    • Lymphocytes 09/12/2022 26    • Monocytes 09/12/2022 12    • Eosinophils 09/12/2022 4    • Basophils PCT 09/12/2022 0    • Neutrophils (Absolute) 09/12/2022 2 6    • Lymphocytes (Absolute) 09/12/2022 1 2    • Monocytes (Absolute) 09/12/2022 0 6    • Eosinophils (Absolute) 09/12/2022 0 2    • Basophils ABS 09/12/2022 0 0    • Immature Granulocytes 09/12/2022 0    • Immature Granulocytes (A* 09/12/2022 0 0    • Glucose, Random 09/12/2022 82    • BUN 09/12/2022 11    • Creatinine 09/12/2022 0 75    • eGFR 09/12/2022 91    • SL AMB BUN/CREATININE RA* 09/12/2022 15    • Sodium 09/12/2022 136    • Potassium 09/12/2022 3 9    • Chloride 09/12/2022 100    • CO2 09/12/2022 27    • CALCIUM 09/12/2022 8 9    • Protein, Total 09/12/2022 6 1    • Albumin 09/12/2022 3 8    • Globulin, Total 09/12/2022 2 3    • Albumin/Globulin Ratio 09/12/2022 1 7    • TOTAL BILIRUBIN 09/12/2022 0 4    • Alk Phos Isoenzymes 09/12/2022 30 (L)    • AST 09/12/2022 17    • ALT 09/12/2022 14    • Specific Gravity 09/12/2022 1 007    • Ph 09/12/2022 5 5    • Color UA 09/12/2022 Yellow    • Urine Appearance 09/12/2022 Clear    • Leukocyte Esterase 09/12/2022 Negative    • Protein 09/12/2022 Negative    • Glucose, 24 HR Urine 09/12/2022 Negative    • Ketone, Urine 09/12/2022 Negative    • Blood, Urine 09/12/2022 Negative    • Bilirubin, Urine 09/12/2022 Negative    • Urobilinogen Urine 09/12/2022 0 2    • SL AMB NITRITES URINE, Q* 09/12/2022 Negative    • Microscopic Examination 09/12/2022 Comment    • Microscopic Examination 09/12/2022 See below:    • SL AMB WBC, URINE 09/12/2022 None seen    • RBC, Urine 09/12/2022 None seen    • Epithelial Cells (non re* 09/12/2022 None seen    • Casts 09/12/2022 None seen    • Bacteria, Urine 09/12/2022 None seen    • Sedimentation Rate 09/12/2022 2    • C-Reactive Protein, Quant 09/12/2022 <1          Radiology Results:   No results found

## 2023-02-14 ENCOUNTER — TELEPHONE (OUTPATIENT)
Dept: NEUROLOGY | Facility: CLINIC | Age: 63
End: 2023-02-14

## 2023-02-14 NOTE — TELEPHONE ENCOUNTER
Patient left voicemail regarding austedo  Would like to increase dose from 6mg to 9mg as previously discussed  Call returned to patient  [phone]  She is reporting more movement issues along with depression  If agreeable, please send script to [facility]

## 2023-02-15 DIAGNOSIS — G10 HUNTINGTON DISEASE (HCC): Primary | ICD-10-CM

## 2023-02-15 RX ORDER — DEUTETRABENAZINE 9 MG/1
9 TABLET, COATED ORAL 2 TIMES DAILY
Qty: 60 TABLET | Refills: 8 | Status: SHIPPED | OUTPATIENT
Start: 2023-02-15

## 2023-03-16 ENCOUNTER — TELEPHONE (OUTPATIENT)
Dept: NEUROLOGY | Facility: CLINIC | Age: 63
End: 2023-03-16

## 2023-03-16 NOTE — TELEPHONE ENCOUNTER
received vm-My name is Magda Simmons  I'm calling from Dr Perry Jernigan [place] office in [place], [place]  It is Thursday, march 16th noon  I'm calling this message is for gela  I finally got in touch with the pharmacy regarding Hyacinth Rinne Pamelia Malone birthday 1960  They did make an error on their end, but you need to be aware of it  So they have to fix it, but I can't do that until after march 22nd  So I just need you to know what has occurred and I appreciate that you called me back  Thank you so much  If you have any questions, please feel free to call [phone]  Thanks biju

## 2023-03-16 NOTE — TELEPHONE ENCOUNTER
Mary Charles (Infusion Nurse) with Dr Charis Hitchcock practice, called and would like a call back today      Mary Charles can be reached at [phone]

## 2023-03-16 NOTE — TELEPHONE ENCOUNTER
Called NILDA RN back from dr Meera Dominugez office, she stated that she was in the phone all morning with [facility] and they are telling her that dr Hassan Manus sent in orders for Actemra, explained that we are not managing that medication and per chart review never sent by us

## 2023-03-29 ENCOUNTER — TELEPHONE (OUTPATIENT)
Dept: NEUROLOGY | Facility: CLINIC | Age: 63
End: 2023-03-29

## 2023-03-29 NOTE — TELEPHONE ENCOUNTER
Called pt and LMOM stating that I am calling in regards to r/s upcoming appt as per the provider  I then asked the patient to please give us a call back to get this appt scheduled

## 2023-06-05 ENCOUNTER — TELEPHONE (OUTPATIENT)
Dept: NEUROLOGY | Facility: CLINIC | Age: 63
End: 2023-06-05

## 2023-06-05 NOTE — TELEPHONE ENCOUNTER
Hello,  Patient called in says that her pharmacy [facility] needs Dr's approval on Austedo 9 mg  Patient only has 3 pills left please expedite  Thank You!     [facility] [phone]

## 2023-06-05 NOTE — TELEPHONE ENCOUNTER
Patient left voicemail regarding the same only 3 pills left  Needs authorization of austedo  Call returned to patient, [phone]  She has 3 days of medication left, but [facility] cannot process script because Gillian Camacho is    NEEDS URGENT PA FOR AUSTEDO    [facility] prior auth department: [phone]

## 2023-06-05 NOTE — TELEPHONE ENCOUNTER
Submitted PA via CMM, key [id number]    Received automatic approval  Called patient to advise; reached vm, left detailed message  Please call pharmacy to fill

## 2023-06-16 DIAGNOSIS — F32.A DEPRESSION, UNSPECIFIED DEPRESSION TYPE: ICD-10-CM

## 2023-06-16 RX ORDER — BUPROPION HYDROCHLORIDE 300 MG/1
TABLET ORAL
Qty: 30 TABLET | Refills: 17 | Status: SHIPPED | OUTPATIENT
Start: 2023-06-16

## 2023-07-10 DIAGNOSIS — K21.9 GASTROESOPHAGEAL REFLUX DISEASE, UNSPECIFIED WHETHER ESOPHAGITIS PRESENT: ICD-10-CM

## 2023-07-10 RX ORDER — OMEPRAZOLE 20 MG/1
20 CAPSULE, DELAYED RELEASE ORAL DAILY
Qty: 30 CAPSULE | Refills: 0 | Status: SHIPPED | OUTPATIENT
Start: 2023-07-10

## 2023-07-10 NOTE — TELEPHONE ENCOUNTER
Patient called into Rx refill line gastro requesting refill on omeprazole. Med sent to gi pod to run protocol if protocol pass please fill at [facility] [place] medication pended sent to pod. Call task completed. TF    Medication refill check list    Correct patient? yes   Correct medication name, dose, and pill size? yes   Correct provider? yes   Last and Next appt. scheduled? 01/2023 with GI    Right pharmacy listed?  yes   Correct quantity for 30 days patient requestd yes

## 2023-07-14 DIAGNOSIS — B00.9 HERPES: ICD-10-CM

## 2023-07-14 RX ORDER — FAMCICLOVIR 250 MG/1
TABLET ORAL
Qty: 30 TABLET | Refills: 0 | Status: SHIPPED | OUTPATIENT
Start: 2023-07-14 | End: 2023-08-09

## 2023-07-26 ENCOUNTER — OFFICE VISIT (OUTPATIENT)
Dept: NEUROLOGY | Facility: CLINIC | Age: 63
End: 2023-07-26
Payer: COMMERCIAL

## 2023-07-26 VITALS
WEIGHT: 112.8 LBS | DIASTOLIC BLOOD PRESSURE: 82 MMHG | SYSTOLIC BLOOD PRESSURE: 134 MMHG | HEART RATE: 82 BPM | BODY MASS INDEX: 22.78 KG/M2

## 2023-07-26 DIAGNOSIS — G10 HUNTINGTON DISEASE (HCC): Primary | ICD-10-CM

## 2023-07-26 PROCEDURE — 99214 OFFICE O/P EST MOD 30 MIN: CPT | Performed by: PSYCHIATRY & NEUROLOGY

## 2023-07-26 NOTE — PATIENT INSTRUCTIONS
Will refer to OT  Will continue Austedo with no change  Contact the office prior to follow up should balance worsen and you wish to consider PT

## 2023-07-26 NOTE — PROGRESS NOTES
Name: Chelly Jacobsen  MRN: [id number]  : 1960  AGE: 65 y.o. Date: 2023    Subjective:  I had the pleasure of seeing your patient, Chelly Jacobsen in the [facility] partner site at [address]. Chief complaint: Lothair's disease    Independent Historian: yes    Genetic testing: at [facility] around   Reported motor onset: "physically and mentally out of control at age 29"  Diagnosis: around  hwne request results    History of seropositive rheumatoid arthritis, Sjogren's, spinal stenosis, and Lothair's disease. Sister is Adria Mendoza. Reports symptoms noted at 29years old after the birth of her daughter. She was noted to be physically and mentally "out of control". At the time she also had thyroid issue and underwent radioactive treatment. She was aware of HD in her family. Around  her and 4 of her sister went to [facility], underwent genetic counseling and got tested. They did not want the results until ready. She requested her results about 9 years ago () which were positive. She has followed at [facility] and with local neurologist Dr. Chente Lamar.      FH: Father and paternal grandfather had HD ([place] ancestry). Oldest brother and 3 sisters with no symptoms of HD. Jose Koroma oldest  at 46 with HD and Zoran passed 62. Adria Mendoza has HD. She lives with her . Her 22year old daughter is in college and is with them at times. She had a 27year old son. Interval history:     Abnormal movements: chorea improved with increase in Autsedo to 9mg bid . She does however still feels movements throughout the day. Gait difficulties: She has trouble with balance. No recent falls. Physical therapy has been helpful in the past.    Normal daily task take longer but she is able to perform independently. [place] twice weekly. She mows the side lawn. Swallowing: No issues. Good appetite.    Weight loss: none  Psychiatric:No trouble with agitation, irritability, apathy, anxiety, depression, SI, disinhibition, aggression, delusions compulsions, hallucinations   Cognitive issues: Continues to have cognitive concerns. She has trouble focusing and remembering. It takes her longer to get through her day. She felt OT helped and she has been trying to use strategies taught but wishes to return for a refresher if possible. Sleep: overall well  Driving issues: No issues , accidents or citation. She does allow others to do most of the driving. POA/ACP: none on file (will address next visit)     Functional Capacity: 6  Occupation   0=unable    Finances   1=major assistance    Domestic chores   1=impaired    ADL (bathing, dressing, eating)    2=minimal impairment    Skill Care Level   2=home                                    .                                Stages         Total Functional Capacity score  1                        11-13                                       2                        7-10                                        3                        3-6                                           4                        1-2                                           5                          0                                       .                               Objective:   Current Medications:  Current Outpatient Medications   Medication Sig Dispense Refill   • buPROPion (WELLBUTRIN XL) 300 mg 24 hr tablet TAKE 1 TABLET BY MOUTH EVERY DAY IN THE MORNING 30 tablet 17   • Cholecalciferol (VITAMIN D3) 2000 units CHEW Daily     • Deutetrabenazine (Austedo) 9 MG TABS Take 9 mg by mouth 2 (two) times a day 60 tablet 8   • Docusate Calcium (STOOL SOFTENER PO) Take 100 mg by mouth daily      • escitalopram (LEXAPRO) 20 mg tablet TAKE 1 TABLET BY MOUTH EVERY DAY 30 tablet 8   • famciclovir (FAMVIR) 250 MG tablet TAKE 1 TABLET BY MOUTH EVERY DAY 30 tablet 0   • Fexofenadine HCl (ALLEGRA PO) Take by mouth daily as needed     • folic acid (FOLVITE) 1 mg tablet Take 1 mg by mouth daily      • levothyroxine 125 mcg tablet TAKE 1 TABLET BY MOUTH EVERY DAY 90 tablet 1   • omeprazole (PriLOSEC) 20 mg delayed release capsule Take 1 capsule (20 mg total) by mouth daily 30 capsule 0   • Polyethyl Glycol-Propyl Glycol (SYSTANE OP)      • predniSONE 5 mg tablet Take 1 tablet (5 mg total) by mouth daily 90 tablet 1   • simvastatin (ZOCOR) 20 mg tablet TAKE 1 TABLET BY MOUTH EVERY DAY 90 tablet 2   • tocilizumab (Actemra) 200 mg/10 mL Infuse 8 mg/kg (402.8 mg) IV every 4 weeks;  Use in combination with 80 mg vials 10 mL 6   • tocilizumab (Actemra) 80 mg/4 mL Inject 4 mL (80 mg total) into a catheter in a vein every 28 days Infuse 8 mg/kg (402.8 mg) IV every 4 weeks; use in combination with 200 mg vial 12 mL 6   • diclofenac (VOLTAREN) 75 mg EC tablet Take 1 tablet (75 mg total) by mouth 2 (two) times a day 180 tablet 1   • ergocalciferol (VITAMIN D2) 50,000 units Take 1 capsule (50,000 Units total) by mouth once a week for 12 doses 12 capsule 0   • hydroxychloroquine (Plaquenil) 200 mg tablet Take 1 tablet (200 mg total) by mouth daily with breakfast 90 tablet 1     No current facility-administered medications for this visit. Physical Exam  Vitals:    07/26/23 1255   BP: 134/82   BP Location: Left arm   Patient Position: Sitting   Cuff Size: Standard   Pulse: 82   Weight: 51.2 kg (112 lb 12.8 oz)      BMI Readings from Last 3 Encounters:   07/26/23 22.78 kg/m²   07/24/23 22.22 kg/m²   06/26/23 22.02 kg/m²        Wt Readings from Last 3 Encounters:   07/26/23 51.2 kg (112 lb 12.8 oz)   07/24/23 49.9 kg (110 lb)   06/26/23 49.4 kg (109 lb)        GENERAL MEDICAL EXAMINATION:  General appearance: alert, in no apparent distress. Appropriately dressed and groomed. Psych: normal and appropriate affect     Mental Status:  Able to relate history without difficulty. Attentive to conversation. Language is fluent and appropriate with normal prosody. There were no paraphasic errors.  Able to follow both midline and appendicular commands. MOCA 22/30. Reduced fluency and executive dysfunction. General neurologic exam:   DRS  Ocular pursuit eye movements: (0) normal. (1) jerky. (2) interrupted but with full range. (3) incomplete. (4) absent. Horizontal:  0  Vertical: 0  Saccade initiation: (0) normal. (1) showed increased latency. (2) required suppressible blinks or head movements. (3) required insuppressible head movements. (4) absent. Horizontal: 1   Vertical: 1  Saccade velocity: (0) normal. (1) showed mild slowing. (2) showed moderate slowing. (3) showed severe slowing but full range. (4) showed incomplete range. Horizontal: 1   Vertical: 0  Speech: 0  (0) normal. (1) unclear, with no need to repeat. (2) was abnormal and must repeat to be understood. (3) was mostly incomprehensible. (4) absent (mute). Tongue protrusion: -   (0) can hold tongue fully protruded for 10 seconds. (1) cannot keep fully protruded for 10 seconds. (2) cannot keep fully protruded for 5 seconds. (3) cannot fully protrude tongue. (4) Cannot protrude tongue beyond lips . Dystonia: (0) absent. (1) slight/intermittent. (2) mild/common or moderate/intermittent. (3) moderate/common. (4) marked/prolonged  Trunk: 2  Right arm: 1  Left arm: 1  Right le  Left le  Chorea:  (0) absent. (1) slight/intermittent. (2) mild/common or moderate/intermittent. (3) moderate/common. (4) marked/prolonged  HE: 2  Face: 1  Trunk: 1  Right arm:2   Left arm: 1  Right le  Left le  Finger tapping: (0) normal (> 15/5 sec.). (1) showed mild slowing and or reduction in amplitude (11-14/5 sec.). (2) moderately impaired with definite and early fatiguing and occasional arrests in movement (7-10/5 sec.). (3) was severely impaired with frequent hesitation in initiating movements or arrests in ongoing movements ( 3-4/5 sec.).  (4) could be barely performed (0-2/5 sec.)   Right hand: 0  Left hand: 0   Hand pronation-supination: (0)normal. (1) showed mild slowing and/or was irregular. (2) showed moderate slowing and was irregular. (3) showed severe slowing and was irregular. (4) could not be performed   Right hand: 0  Left hand: 0   Luria task (fist-hand-palm test) with the dominant hand: 1  (0) >4 in 10 seconds, no cue. (1) <4 in 10 seconds, no cue. (2) >4 in 10 seconds, with cues. (3) <4 in 10 seconds with cues. (4) cannot be performed. Rigidity: (0) absent. (1) slight or present only with activation. (2)  mild to moderate. (3) severe, with full range of motion. (4) severe with limited range  Right arm:0   Left arm:  0  General speed of movement 0  (0) normal. (1) minimally slow (? Normal). (2) mildly but clearly slow. (3) moderately slow, with some hesitation. (4) markedly slow, with long delays in initiation. Gait was 1  (0) normal, with narrow base. (1) wide based and/or slow. (2) wide base and walking with difficulty. (3) attempted only with assistance. (4) could not be attempted. Tandem gait (10 steps) 3   (0) normal. (1) showed 1 to 3 deviations. (2) showed  >3 deviations. (3) could not be completed. (4) could not be attempted. Pull test: 2  (0) normal. (1) recovers spontaneously. (2) would fall if not caught. (3) tends to fall spontaneously. (4) cannot stand. Motor diagnostic confidence level: 4  0 (normal exam). 1 (mild non-specific motor abnormalities). 2 (motor abnormalities that might be signs of HD (50-89% confidence). 3 (motor abnormalities that are very likely (>90%) signs of HD). 4 (motor abnormalities that are unequivocal (99%) signs of HD. 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 She is more easily able to perform daily activities. At this point response appears adequate therefore no changes will be made. Mood has been stable. Will refer to OT for cognitive therapy and with assistance in improving ADLs.   Will continue Austedo 9 mg twice daily with no change  Contact the office prior to follow up should balance worsen and we can refer to physical therapy.         Nazia Armenta MD  Movement disorder specialist  Director, [address] [place] [address] Partner Site at [facility]

## 2023-07-26 NOTE — PROGRESS NOTES
Review of Systems   Constitutional: Negative for appetite change, fatigue and fever. HENT: Negative. Negative for hearing loss, tinnitus, trouble swallowing and voice change. Eyes: Negative. Negative for photophobia, pain and visual disturbance. Respiratory: Negative. Negative for shortness of breath. Cardiovascular: Negative. Negative for palpitations. Gastrointestinal: Negative. Negative for nausea and vomiting. Endocrine: Negative. Negative for cold intolerance. Genitourinary: Negative. Negative for dysuria, frequency and urgency. Musculoskeletal: Positive for arthralgias and gait problem (Stumbling and shuffling of feet, has gotten worse). Negative for back pain, myalgias and neck pain. Balance issues, has gotten better  Involuntary movements has gotten better     Skin: Negative. Negative for rash. Allergic/Immunologic: Negative. Neurological: Positive for speech difficulty (At times in the AM). Negative for dizziness, tremors, seizures, syncope, facial asymmetry, weakness, light-headedness, numbness and headaches. Hematological: Bruises/bleeds easily. Psychiatric/Behavioral: Negative for confusion, dysphoric mood, hallucinations and sleep disturbance. Memory Issues     All other systems reviewed and are negative.

## 2023-07-26 NOTE — ASSESSMENT & PLAN NOTE
Apalachicola's disease with moderate chorea which contributes to imbalance and difficulty with hand coordination affecting some finer activities. She previously was on Austedo 9mg qhs with partial improvement but ran out of medication so it was restarted on 6mg qhs. Never on higher or bid dosing. She denies any active depression or side effects. We will increase Austedo to 6mg bid. If no improvement she will contact the office and we will further increase to 9mg bid. Instructed to ontact the office if having side effects    She would benefit from physical therapy to improve gait and balance. She has cognitive symptoms with difficulty multitasking and prioritizing her time. It takes her longer to eat and perform daily tasks in part related to dry mouth due to Sjogren's. Recommended she follow through with medication trial suggested by her rheumatologist for dry mouth. Would benefit from occuptional therapy for exercises to improve coordination/ ADL's and cognitive therapy.

## 2023-08-04 DIAGNOSIS — K21.9 GASTROESOPHAGEAL REFLUX DISEASE, UNSPECIFIED WHETHER ESOPHAGITIS PRESENT: ICD-10-CM

## 2023-08-04 RX ORDER — OMEPRAZOLE 20 MG/1
20 CAPSULE, DELAYED RELEASE ORAL DAILY
Qty: 30 CAPSULE | Refills: 2 | Status: SHIPPED | OUTPATIENT
Start: 2023-08-04

## 2023-08-09 DIAGNOSIS — B00.9 HERPES: ICD-10-CM

## 2023-08-09 RX ORDER — FAMCICLOVIR 250 MG/1
TABLET ORAL
Qty: 90 TABLET | Refills: 0 | Status: SHIPPED | OUTPATIENT
Start: 2023-08-09 | End: 2023-09-08

## 2023-09-06 DIAGNOSIS — E78.5 HYPERLIPIDEMIA, UNSPECIFIED HYPERLIPIDEMIA TYPE: ICD-10-CM

## 2023-09-06 RX ORDER — SIMVASTATIN 20 MG
TABLET ORAL
Qty: 30 TABLET | Refills: 8 | Status: SHIPPED | OUTPATIENT
Start: 2023-09-06

## 2023-09-12 DIAGNOSIS — E03.9 HYPOTHYROIDISM, UNSPECIFIED TYPE: ICD-10-CM

## 2023-09-12 RX ORDER — LEVOTHYROXINE SODIUM 0.12 MG/1
TABLET ORAL
Qty: 30 TABLET | Refills: 0 | Status: SHIPPED | OUTPATIENT
Start: 2023-09-12

## 2023-09-12 NOTE — TELEPHONE ENCOUNTER
Patient needs an appointment. Please contact patient to schedule an appointment. Courtesy refill provided.

## 2023-10-10 DIAGNOSIS — F32.A DEPRESSION, UNSPECIFIED DEPRESSION TYPE: ICD-10-CM

## 2023-10-10 DIAGNOSIS — E78.5 HYPERLIPIDEMIA, UNSPECIFIED HYPERLIPIDEMIA TYPE: ICD-10-CM

## 2023-10-10 DIAGNOSIS — E03.9 HYPOTHYROIDISM, UNSPECIFIED TYPE: Primary | ICD-10-CM

## 2023-10-16 ENCOUNTER — RA CDI HCC (OUTPATIENT)
Dept: OTHER | Facility: HOSPITAL | Age: 63
End: 2023-10-16

## 2023-10-16 NOTE — PROGRESS NOTES
[facility] coding opportunities       Chart reviewed, no opportunity found: CHART REVIEWED, NO OPPORTUNITY FOUND        Patients Insurance        Commercial Insurance: [address]

## 2023-10-21 LAB
CHOLEST SERPL-MCNC: 276 MG/DL (ref 100–199)
CYTOLOGY CMNT CVX/VAG CYTO-IMP: ABNORMAL
HDLC SERPL-MCNC: 61 MG/DL
LDLC SERPL CALC-MCNC: 192 MG/DL (ref 0–99)
MICRODELETION SYND BLD/T FISH: NORMAL
SL AMB T4, FREE (DIRECT): 1.91 NG/DL (ref 0.82–1.77)
SL AMB VLDL CHOLESTEROL CALC: 23 MG/DL (ref 5–40)
TRIGL SERPL-MCNC: 129 MG/DL (ref 0–149)
TSH SERPL DL<=0.005 MIU/L-ACNC: 6.62 UIU/ML (ref 0.45–4.5)

## 2023-10-23 ENCOUNTER — OFFICE VISIT (OUTPATIENT)
Dept: FAMILY MEDICINE CLINIC | Facility: CLINIC | Age: 63
End: 2023-10-23
Payer: COMMERCIAL

## 2023-10-23 VITALS
WEIGHT: 110.6 LBS | RESPIRATION RATE: 97 BRPM | HEART RATE: 86 BPM | TEMPERATURE: 97.9 F | HEIGHT: 59 IN | DIASTOLIC BLOOD PRESSURE: 70 MMHG | SYSTOLIC BLOOD PRESSURE: 104 MMHG | BODY MASS INDEX: 22.3 KG/M2

## 2023-10-23 DIAGNOSIS — G10 HUNTINGTON DISEASE (HCC): ICD-10-CM

## 2023-10-23 DIAGNOSIS — Z00.00 ANNUAL PHYSICAL EXAM: Primary | ICD-10-CM

## 2023-10-23 DIAGNOSIS — Z23 ENCOUNTER FOR IMMUNIZATION: ICD-10-CM

## 2023-10-23 DIAGNOSIS — Z12.31 ENCOUNTER FOR SCREENING MAMMOGRAM FOR MALIGNANT NEOPLASM OF BREAST: ICD-10-CM

## 2023-10-23 DIAGNOSIS — E78.5 HYPERLIPIDEMIA, UNSPECIFIED HYPERLIPIDEMIA TYPE: ICD-10-CM

## 2023-10-23 DIAGNOSIS — E89.0 POSTABLATIVE HYPOTHYROIDISM: ICD-10-CM

## 2023-10-23 PROCEDURE — 90686 IIV4 VACC NO PRSV 0.5 ML IM: CPT | Performed by: FAMILY MEDICINE

## 2023-10-23 PROCEDURE — 99396 PREV VISIT EST AGE 40-64: CPT | Performed by: FAMILY MEDICINE

## 2023-10-23 PROCEDURE — 90471 IMMUNIZATION ADMIN: CPT | Performed by: FAMILY MEDICINE

## 2023-10-23 RX ORDER — LATANOPROST 50 UG/ML
SOLUTION/ DROPS OPHTHALMIC
COMMUNITY

## 2023-10-23 RX ORDER — BRIMONIDINE TARTRATE AND TIMOLOL MALEATE 2; 5 MG/ML; MG/ML
SOLUTION OPHTHALMIC
COMMUNITY

## 2023-10-23 NOTE — PROGRESS NOTES
[facility] PRACT[facility]    NAME: Eve Mak  AGE: 65 y.o. SEX: female  : 1960     DATE: 10/23/2023     Assessment and Plan:     Problem List Items Addressed This Visit        Endocrine    Hypothyroidism    Relevant Orders    Comprehensive metabolic panel    TSH, 3rd generation with Free T4 reflex       Nervous and Auditory    [place] disease ([address])       Other    Encounter for screening mammogram for malignant neoplasm of breast    Relevant Orders    Mammo screening bilateral w 3d & cad   Other Visit Diagnoses     Annual physical exam    -  Primary    Encounter for immunization        Relevant Orders    influenza vaccine, quadrivalent, 0.5 mL, preservative-free, for adult and pediatric patients 6 mos+ (AFLURIA, FLUARIX, FLULAVAL, FLUZONE) (Completed)    Hyperlipidemia, unspecified hyperlipidemia type        Relevant Orders    Ambulatory Referral to Cardiology        Referral to cardiology given elevation of LDL patient aware of this finding would likely benefit from a calcium score test but would like the cardiologist for an evaluation  History of hypothyroidism sent for repeat TSH. Believe it is because the patient has been taking it in the afternoon. Depression with anxiety currently well controlled on Wellbutrin and Lexapro. Anne Marie's being managed by her neurology team  We will see the patient back in 6 months from [address] standpoint the patient states that she has been stable       No follow-ups on file. Chief Complaint:     Chief Complaint   Patient presents with   • Physical Exam      History of Present Illness:     Adult Annual Physical   Patient here for a comprehensive physical exam.   Patient states from a Anne Marie standpoint she has been very stable by her neurologist.  Mary De Paz her medications for depression states that she does have moments where she is depressed.   Patient states that she saw her blood work results and is okay with seeing the specialist was advised by the pharmacist to start taking her thyroid medication in the afternoon rather than the morning  Diet and Physical Activity  Diet/Nutrition: well balanced diet. Exercise: no formal exercise. Depression Screening  PHQ-2/9 Depression Screening    Little interest or pleasure in doing things: 0 - not at all  Feeling down, depressed, or hopeless: 1 - several days  Trouble falling or staying asleep, or sleeping too much: 0 - not at all  Feeling tired or having little energy: 1 - several days  Poor appetite or overeatin - not at all  Feeling bad about yourself - or that you are a failure or have let yourself or your family down: 0 - not at all  Trouble concentrating on things, such as reading the newspaper or watching television: 3 - nearly every day  Moving or speaking so slowly that other people could have noticed. Or the opposite - being so fidgety or restless that you have been moving around a lot more than usual: 0 - not at all  Thoughts that you would be better off dead, or of hurting yourself in some way: 0 - not at all  PHQ-9 Score: 5   PHQ-9 Interpretation: Mild depression        General Health  Sleep: sleeps well. Hearing: normal - bilateral.     Review of Systems:     Review of Systems   Constitutional:  Negative for activity change, appetite change, chills, fatigue and fever. HENT:  Negative for congestion. Respiratory:  Negative for cough, chest tightness and shortness of breath. Cardiovascular:  Negative for chest pain and leg swelling. Gastrointestinal:  Negative for abdominal distention, abdominal pain, constipation, diarrhea, nausea and vomiting. All other systems reviewed and are negative.      Past Medical History:     Past Medical History:   Diagnosis Date   • Allergic rhinitis    • Anxiety    • Colitis    • Colon polyps     + history for colon polyps   • Depression    • Disease of thyroid gland    • Full dentures    • GERD (gastroesophageal reflux disease)    • Glaucoma    • Glaucoma    • Herpes gingivostomatitis    • Herpes simplex infection    • [place]'s disease ([address])    • Hyperlipidemia    • Hypertension     new onset 05/2019   • Multiple benign polyps of large intestine    • Osteoarthritis    • Osteopenia    • Osteoporosis    • RA (rheumatoid arthritis) (HCC)    • Sjogren's disease (HCC)       Past Surgical History:     Past Surgical History:   Procedure Laterality Date   • COLONOSCOPY     • DENTAL SURGERY      upper and lower extractions   • ESOPHAGOGASTRODUODENOSCOPY      DIAGNOSTIC   • INCONTINENCE SURGERY      LAPAROSCOPIC SLING OPERATION FOR STRESS INCONTINENCE    • UPPER GASTROINTESTINAL ENDOSCOPY        Social History:     Social History     Socioeconomic History   • Marital status: /Civil Union     Spouse name: None   • Number of children: None   • Years of education: None   • Highest education level: None   Occupational History   • None   Tobacco Use   • Smoking status: Never   • Smokeless tobacco: Never   Substance and Sexual Activity   • Alcohol use: Not Currently     Comment: SOCIAL    • Drug use: Never   • Sexual activity: None   Other Topics Concern   • None   Social History Narrative    Daily coffee consumption (2 cups/ day)     Social Determinants of Health     Financial Resource Strain: Not on file   Food Insecurity: Not on file   Transportation Needs: Not on file   Physical Activity: Not on file   Stress: Not on file   Social Connections: Not on file   Intimate Partner Violence: Not on file   Housing Stability: Not on file      Family History:     Family History   Problem Relation Age of Onset   • Anne Marie's disease Father    • Alcohol abuse Father    • Coronary artery disease Family    • Hypertension Mother    • Diabetes Mother    • Meniere's disease Brother    • Lupus Daughter    • Immunodeficiency Son    • Diabetes Paternal Grandmother    • Heart disease Paternal Grandmother    • No Known Problems Sister    • No Known Problems Sister    • No Known Problems Sister    • Meniere's disease Brother    • No Known Problems Maternal Aunt    • No Known Problems Maternal Aunt       Current Medications:     Current Outpatient Medications   Medication Sig Dispense Refill   • brimonidine-timolol (COMBIGAN) 0.2-0.5 % INSTILL 1 DROP INTO RIGHT EYE TWICE A DAY     • buPROPion (WELLBUTRIN XL) 300 mg 24 hr tablet TAKE 1 TABLET BY MOUTH EVERY DAY IN THE MORNING 30 tablet 17   • Cholecalciferol (VITAMIN D3) 2000 units CHEW Daily     • Deutetrabenazine (Austedo) 9 MG TABS Take 9 mg by mouth 2 (two) times a day 60 tablet 8   • diclofenac (VOLTAREN) 75 mg EC tablet Take 1 tablet (75 mg total) by mouth 2 (two) times a day 180 tablet 1   • Docusate Calcium (STOOL SOFTENER PO) Take 100 mg by mouth daily      • ergocalciferol (VITAMIN D2) 50,000 units Take 1 capsule (50,000 Units total) by mouth once a week for 12 doses 12 capsule 0   • escitalopram (LEXAPRO) 20 mg tablet TAKE 1 TABLET BY MOUTH EVERY DAY 30 tablet 8   • famciclovir (FAMVIR) 250 MG tablet TAKE 1 TABLET BY MOUTH EVERY DAY 90 tablet 0   • Fexofenadine HCl (ALLEGRA PO) Take by mouth daily as needed     • folic acid (FOLVITE) 1 mg tablet Take 1 mg by mouth daily      • latanoprost (XALATAN) 0.005 % ophthalmic solution INSTILL 1 DROP INTO RIGHT EYE AT BEDTIME     • levothyroxine 125 mcg tablet TAKE 1 TABLET BY MOUTH EVERY DAY 30 tablet 0   • omeprazole (PriLOSEC) 20 mg delayed release capsule TAKE 1 CAPSULE BY MOUTH EVERY DAY 30 capsule 2   • Polyethyl Glycol-Propyl Glycol (SYSTANE OP)      • simvastatin (ZOCOR) 20 mg tablet TAKE 1 TABLET BY MOUTH EVERY DAY 30 tablet 8   • tocilizumab (Actemra) 200 mg/10 mL Infuse 8 mg/kg (402.8 mg) IV every 4 weeks;  Use in combination with 80 mg vials 10 mL 6   • tocilizumab (Actemra) 80 mg/4 mL Inject 4 mL (80 mg total) into a catheter in a vein every 28 days Infuse 8 mg/kg (402.8 mg) IV every 4 weeks; use in combination with 200 mg vial 12 mL 6   • predniSONE 5 mg tablet Take 1 tablet (5 mg total) by mouth daily (Patient not taking: Reported on 10/23/2023) 90 tablet 1     No current facility-administered medications for this visit. Allergies: Allergies   Allergen Reactions   • Gold Itching   • Sulfa Antibiotics Hives and Rash   • Valacyclovir Palpitations      Physical Exam:     /70 (BP Location: Left arm, Patient Position: Sitting, Cuff Size: Large)   Pulse 86   Temp 97.9 °F (36.6 °C)   Resp (!) 97   Ht 4' 11" (1.499 m)   Wt 50.2 kg (110 lb 9.6 oz)   BMI 22.34 kg/m²     Physical Exam  Vitals reviewed. Constitutional:       Appearance: Normal appearance. She is well-developed. HENT:      Head: Normocephalic and atraumatic. Right Ear: Tympanic membrane, ear canal and external ear normal. There is no impacted cerumen. Left Ear: Tympanic membrane, ear canal and external ear normal. There is no impacted cerumen. Nose: Nose normal.      Mouth/Throat:      Mouth: Mucous membranes are moist.      Pharynx: Oropharynx is clear. Eyes:      Conjunctiva/sclera: Conjunctivae normal.      Pupils: Pupils are equal, round, and reactive to light. Cardiovascular:      Rate and Rhythm: Normal rate and regular rhythm. Heart sounds: Normal heart sounds. Pulmonary:      Effort: Pulmonary effort is normal.      Breath sounds: Normal breath sounds. Abdominal:      General: Abdomen is flat. Bowel sounds are normal.      Palpations: Abdomen is soft. Musculoskeletal:         General: Normal range of motion. Cervical back: Normal range of motion and neck supple. Skin:     General: Skin is warm. Capillary Refill: Capillary refill takes less than 2 seconds. Neurological:      General: No focal deficit present. Mental Status: She is alert and oriented to person, place, and time. Mental status is at baseline.    Psychiatric:         Mood and Affect: Mood normal.         Behavior: Behavior normal. Thought Content:  Thought content normal.         Judgment: Judgment normal.          Dami Ferrell MD  [facility]

## 2023-11-05 ENCOUNTER — APPOINTMENT (EMERGENCY)
Dept: RADIOLOGY | Facility: HOSPITAL | Age: 63
End: 2023-11-05
Payer: COMMERCIAL

## 2023-11-05 ENCOUNTER — NURSE TRIAGE (OUTPATIENT)
Dept: OTHER | Facility: OTHER | Age: 63
End: 2023-11-05

## 2023-11-05 ENCOUNTER — HOSPITAL ENCOUNTER (EMERGENCY)
Facility: HOSPITAL | Age: 63
Discharge: HOME | End: 2023-11-05
Attending: EMERGENCY MEDICINE
Payer: COMMERCIAL

## 2023-11-05 VITALS
DIASTOLIC BLOOD PRESSURE: 84 MMHG | RESPIRATION RATE: 16 BRPM | WEIGHT: 107.6 LBS | OXYGEN SATURATION: 100 % | SYSTOLIC BLOOD PRESSURE: 176 MMHG | HEART RATE: 77 BPM | TEMPERATURE: 98.9 F | BODY MASS INDEX: 21.73 KG/M2

## 2023-11-05 DIAGNOSIS — J20.9 ACUTE BRONCHITIS: Primary | ICD-10-CM

## 2023-11-05 DIAGNOSIS — Z87.09 HISTORY OF ASTHMA: ICD-10-CM

## 2023-11-05 LAB
ALBUMIN SERPL BCP-MCNC: 4.2 G/DL (ref 3.5–5)
ALP SERPL-CCNC: 37 U/L (ref 34–104)
ALT SERPL W P-5'-P-CCNC: 16 U/L (ref 7–52)
ANION GAP SERPL CALCULATED.3IONS-SCNC: 6 MMOL/L
AST SERPL W P-5'-P-CCNC: 18 U/L (ref 13–39)
BASOPHILS # BLD AUTO: 0.01 THOUSANDS/ÂΜL (ref 0–0.1)
BASOPHILS NFR BLD AUTO: 0 % (ref 0–1)
BILIRUB SERPL-MCNC: 0.52 MG/DL (ref 0.2–1)
BNP SERPL-MCNC: 143 PG/ML (ref 0–100)
BUN SERPL-MCNC: 6 MG/DL (ref 5–25)
CALCIUM SERPL-MCNC: 8.8 MG/DL (ref 8.4–10.2)
CARDIAC TROPONIN I PNL SERPL HS: 3 NG/L
CHLORIDE SERPL-SCNC: 98 MMOL/L (ref 96–108)
CO2 SERPL-SCNC: 25 MMOL/L (ref 21–32)
CREAT SERPL-MCNC: 0.71 MG/DL (ref 0.6–1.3)
EOSINOPHIL # BLD AUTO: 0.15 THOUSAND/ÂΜL (ref 0–0.61)
EOSINOPHIL NFR BLD AUTO: 2 % (ref 0–6)
ERYTHROCYTE [DISTWIDTH] IN BLOOD BY AUTOMATED COUNT: 11.6 % (ref 11.6–15.1)
FLUAV RNA RESP QL NAA+PROBE: NEGATIVE
FLUBV RNA RESP QL NAA+PROBE: NEGATIVE
GFR SERPL CREATININE-BSD FRML MDRD: 91 ML/MIN/1.73SQ M
GLUCOSE SERPL-MCNC: 104 MG/DL (ref 65–140)
HCT VFR BLD AUTO: 38.9 % (ref 34.8–46.1)
HGB BLD-MCNC: 14 G/DL (ref 11.5–15.4)
IMM GRANULOCYTES # BLD AUTO: 0.02 THOUSAND/UL (ref 0–0.2)
IMM GRANULOCYTES NFR BLD AUTO: 0 % (ref 0–2)
LYMPHOCYTES # BLD AUTO: 0.86 THOUSANDS/ÂΜL (ref 0.6–4.47)
LYMPHOCYTES NFR BLD AUTO: 14 % (ref 14–44)
MCH RBC QN AUTO: 32.3 PG (ref 26.8–34.3)
MCHC RBC AUTO-ENTMCNC: 36 G/DL (ref 31.4–37.4)
MCV RBC AUTO: 90 FL (ref 82–98)
MONOCYTES # BLD AUTO: 0.4 THOUSAND/ÂΜL (ref 0.17–1.22)
MONOCYTES NFR BLD AUTO: 6 % (ref 4–12)
NEUTROPHILS # BLD AUTO: 4.95 THOUSANDS/ÂΜL (ref 1.85–7.62)
NEUTS SEG NFR BLD AUTO: 78 % (ref 43–75)
NRBC BLD AUTO-RTO: 0 /100 WBCS
PLATELET # BLD AUTO: 221 THOUSANDS/UL (ref 149–390)
PMV BLD AUTO: 8.8 FL (ref 8.9–12.7)
POTASSIUM SERPL-SCNC: 4.6 MMOL/L (ref 3.5–5.3)
PROT SERPL-MCNC: 6.6 G/DL (ref 6.4–8.4)
RBC # BLD AUTO: 4.33 MILLION/UL (ref 3.81–5.12)
RSV RNA RESP QL NAA+PROBE: NEGATIVE
SARS-COV-2 RNA RESP QL NAA+PROBE: NEGATIVE
SODIUM SERPL-SCNC: 129 MMOL/L (ref 135–147)
WBC # BLD AUTO: 6.39 THOUSAND/UL (ref 4.31–10.16)

## 2023-11-05 PROCEDURE — 96375 TX/PRO/DX INJ NEW DRUG ADDON: CPT

## 2023-11-05 PROCEDURE — 80053 COMPREHEN METABOLIC PANEL: CPT | Performed by: EMERGENCY MEDICINE

## 2023-11-05 PROCEDURE — 96361 HYDRATE IV INFUSION ADD-ON: CPT

## 2023-11-05 PROCEDURE — 84484 ASSAY OF TROPONIN QUANT: CPT | Performed by: EMERGENCY MEDICINE

## 2023-11-05 PROCEDURE — 0241U HB NFCT DS VIR RESP RNA 4 TRGT: CPT | Performed by: EMERGENCY MEDICINE

## 2023-11-05 PROCEDURE — 99285 EMERGENCY DEPT VISIT HI MDM: CPT

## 2023-11-05 PROCEDURE — 36415 COLL VENOUS BLD VENIPUNCTURE: CPT | Performed by: EMERGENCY MEDICINE

## 2023-11-05 PROCEDURE — 94640 AIRWAY INHALATION TREATMENT: CPT

## 2023-11-05 PROCEDURE — 96365 THER/PROPH/DIAG IV INF INIT: CPT

## 2023-11-05 PROCEDURE — 99285 EMERGENCY DEPT VISIT HI MDM: CPT | Performed by: EMERGENCY MEDICINE

## 2023-11-05 PROCEDURE — 71046 X-RAY EXAM CHEST 2 VIEWS: CPT

## 2023-11-05 PROCEDURE — 93005 ELECTROCARDIOGRAM TRACING: CPT

## 2023-11-05 PROCEDURE — 85025 COMPLETE CBC W/AUTO DIFF WBC: CPT | Performed by: EMERGENCY MEDICINE

## 2023-11-05 PROCEDURE — 83880 ASSAY OF NATRIURETIC PEPTIDE: CPT | Performed by: EMERGENCY MEDICINE

## 2023-11-05 RX ORDER — METHYLPREDNISOLONE SODIUM SUCCINATE 125 MG/2ML
60 INJECTION, POWDER, LYOPHILIZED, FOR SOLUTION INTRAMUSCULAR; INTRAVENOUS ONCE
Status: COMPLETED | OUTPATIENT
Start: 2023-11-05 | End: 2023-11-05

## 2023-11-05 RX ORDER — AZITHROMYCIN 250 MG/1
TABLET, FILM COATED ORAL
Qty: 6 TABLET | Refills: 0 | Status: SHIPPED | OUTPATIENT
Start: 2023-11-05 | End: 2023-11-09 | Stop reason: SDUPTHER

## 2023-11-05 RX ORDER — HYDROCODONE POLISTIREX AND CHLORPHENIRAMINE POLISTIREX 10; 8 MG/5ML; MG/5ML
5 SUSPENSION, EXTENDED RELEASE ORAL ONCE
Status: COMPLETED | OUTPATIENT
Start: 2023-11-05 | End: 2023-11-05

## 2023-11-05 RX ORDER — PREDNISONE 20 MG/1
40 TABLET ORAL DAILY
Qty: 8 TABLET | Refills: 0 | Status: SHIPPED | OUTPATIENT
Start: 2023-11-05 | End: 2023-11-09

## 2023-11-05 RX ORDER — ALBUTEROL SULFATE 90 UG/1
2 AEROSOL, METERED RESPIRATORY (INHALATION) EVERY 6 HOURS PRN
Qty: 8.5 G | Refills: 0 | Status: SHIPPED | OUTPATIENT
Start: 2023-11-05

## 2023-11-05 RX ORDER — MAGNESIUM SULFATE HEPTAHYDRATE 40 MG/ML
2 INJECTION, SOLUTION INTRAVENOUS ONCE
Status: COMPLETED | OUTPATIENT
Start: 2023-11-05 | End: 2023-11-05

## 2023-11-05 RX ORDER — ALBUTEROL SULFATE 90 UG/1
2 AEROSOL, METERED RESPIRATORY (INHALATION) ONCE
Status: COMPLETED | OUTPATIENT
Start: 2023-11-05 | End: 2023-11-05

## 2023-11-05 RX ORDER — HYDROCODONE POLISTIREX AND CHLORPHENIRAMINE POLISTIREX 10; 8 MG/5ML; MG/5ML
5 SUSPENSION, EXTENDED RELEASE ORAL EVERY 12 HOURS PRN
Qty: 115 ML | Refills: 0 | Status: SHIPPED | OUTPATIENT
Start: 2023-11-05 | End: 2023-11-15

## 2023-11-05 RX ADMIN — IPRATROPIUM BROMIDE 0.5 MG: 0.5 SOLUTION RESPIRATORY (INHALATION) at 16:57

## 2023-11-05 RX ADMIN — METHYLPREDNISOLONE SODIUM SUCCINATE 60 MG: 125 INJECTION, POWDER, FOR SOLUTION INTRAMUSCULAR; INTRAVENOUS at 16:11

## 2023-11-05 RX ADMIN — ALBUTEROL SULFATE 5 MG: 2.5 SOLUTION RESPIRATORY (INHALATION) at 16:09

## 2023-11-05 RX ADMIN — SODIUM CHLORIDE 1000 ML: 0.9 INJECTION, SOLUTION INTRAVENOUS at 16:21

## 2023-11-05 RX ADMIN — ALBUTEROL SULFATE 5 MG: 2.5 SOLUTION RESPIRATORY (INHALATION) at 16:57

## 2023-11-05 RX ADMIN — ALBUTEROL SULFATE 2 PUFF: 90 AEROSOL, METERED RESPIRATORY (INHALATION) at 16:55

## 2023-11-05 RX ADMIN — HYDROCODONE POLISTIREX AND CHLORPHENIRAMINE POLISTIREX 5 ML: 10; 8 SUSPENSION, EXTENDED RELEASE ORAL at 15:49

## 2023-11-05 RX ADMIN — IPRATROPIUM BROMIDE 0.5 MG: 0.5 SOLUTION RESPIRATORY (INHALATION) at 16:09

## 2023-11-05 RX ADMIN — MAGNESIUM SULFATE HEPTAHYDRATE 2 G: 40 INJECTION, SOLUTION INTRAVENOUS at 16:14

## 2023-11-05 NOTE — ED PROVIDER NOTES
History  Chief Complaint   Patient presents with    Shortness of Breath     States she started 9 days ago with a cough and cannot sleep at night. States sitting in the tub " Helps me breath ". States coughing up green phlegm. No fevers. 20-year-old female with past history of Guay[place]'s disease, rheumatoid arthritis, hyperlipidemia, osteoporosis, GERD, osteoarthritis, chronic pain, depression, anxiety, hypertension, hypothyroidism, presents to the ED for evaluation of cough productive of green sputum over the past 9 to 10 days. Patient states that she is having difficulty sleeping at night secondary to cough. Patient denies having any fevers or chills. Patient was on longtime prednisone therapy of 5 mg a day for many years secondary to her rheumatoid arthritis. Patient recently came completely off of steroids. Patient states that she used to have asthma as a child, but it has not bothered her in many years. Patient came to the ED today as her symptoms have persisted for so long and is not improving. History provided by:  Patient   used: No    Shortness of Breath  Associated symptoms: cough    Associated symptoms: no abdominal pain, no chest pain, no ear pain, no fever, no rash, no sore throat and no vomiting        Prior to Admission Medications   Prescriptions Last Dose Informant Patient Reported? Taking?    Cholecalciferol (VITAMIN D3) 2000 units CHEW  Self Yes No   Sig: Daily   Deutetrabenazine (Austedo) 9 MG TABS   No No   Sig: Take 9 mg by mouth 2 (two) times a day   Docusate Calcium (STOOL SOFTENER PO)  Self Yes No   Sig: Take 100 mg by mouth daily    Fexofenadine HCl (ALLEGRA PO)  Self Yes No   Sig: Take by mouth daily as needed   Polyethyl Glycol-Propyl Glycol (SYSTANE OP)  Self Yes No   brimonidine-timolol (COMBIGAN) 0.2-0.5 %   Yes No   Sig: INSTILL 1 DROP INTO RIGHT EYE TWICE A DAY   buPROPion (WELLBUTRIN XL) 300 mg 24 hr tablet   No No   Sig: TAKE 1 TABLET BY MOUTH EVERY DAY IN THE MORNING   diclofenac (VOLTAREN) 75 mg EC tablet  Self No No   Sig: Take 1 tablet (75 mg total) by mouth 2 (two) times a day   ergocalciferol (VITAMIN D2) 50,000 units   No No   Sig: Take 1 capsule (50,000 Units total) by mouth once a week for 12 doses   escitalopram (LEXAPRO) 20 mg tablet   No No   Sig: TAKE 1 TABLET BY MOUTH EVERY DAY   famciclovir (FAMVIR) 250 MG tablet   No No   Sig: TAKE 1 TABLET BY MOUTH EVERY DAY   folic acid (FOLVITE) 1 mg tablet  Self Yes No   Sig: Take 1 mg by mouth daily    latanoprost (XALATAN) 0.005 % ophthalmic solution   Yes No   Sig: INSTILL 1 DROP INTO RIGHT EYE AT BEDTIME   levothyroxine 125 mcg tablet   No No   Sig: TAKE 1 TABLET BY MOUTH EVERY DAY   omeprazole (PriLOSEC) 20 mg delayed release capsule   No No   Sig: TAKE 1 CAPSULE BY MOUTH EVERY DAY   predniSONE 5 mg tablet   No No   Sig: Take 1 tablet (5 mg total) by mouth daily   Patient not taking: Reported on 10/23/2023   simvastatin (ZOCOR) 20 mg tablet   No No   Sig: TAKE 1 TABLET BY MOUTH EVERY DAY   tocilizumab (Actemra) 200 mg/10 mL  Self No No   Sig: Infuse 8 mg/kg (402.8 mg) IV every 4 weeks;  Use in combination with 80 mg vials   tocilizumab (Actemra) 80 mg/4 mL  Self No No   Sig: Inject 4 mL (80 mg total) into a catheter in a vein every 28 days Infuse 8 mg/kg (402.8 mg) IV every 4 weeks; use in combination with 200 mg vial      Facility-Administered Medications: None       Past Medical History:   Diagnosis Date    Allergic rhinitis     Anxiety     Colitis     Colon polyps     + history for colon polyps    Depression     Disease of thyroid gland     Full dentures     GERD (gastroesophageal reflux disease)     Glaucoma     Glaucoma     Herpes gingivostomatitis     Herpes simplex infection     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[address])     Hyperlipidemia     Hypertension     new onset 05/2019    Multiple benign polyps of large intestine     Osteoarthritis     Osteopenia     Osteoporosis     RA (rheumatoid arthritis) ([address] [facility])     Sjogren's disease ([address] [facility])        Past Surgical History:   Procedure Laterality Date    COLONOSCOPY      DENTAL SURGERY      upper and lower extractions    ESOPHAGOGASTRODUODENOSCOPY      DIAGNOSTIC    INCONTINENCE SURGERY      LAPAROSCOPIC SLING OPERATION FOR STRESS INCONTINENCE     UPPER GASTROINTESTINAL ENDOSCOPY         Family History   Problem Relation Age of Onset    Anne Marie's disease Father     Alcohol abuse Father     Coronary artery disease Family     Hypertension Mother     Diabetes Mother     Meniere's disease Brother     Lupus Daughter     Immunodeficiency Son     Diabetes Paternal Grandmother     Heart disease Paternal Grandmother     No Known Problems Sister     No Known Problems Sister     No Known Problems Sister     Meniere's disease Brother     No Known Problems Maternal Aunt     No Known Problems Maternal Aunt      I have reviewed and agree with the history as documented. E-Cigarette/Vaping    E-Cigarette Use Never User      E-Cigarette/Vaping Substances     Social History     Tobacco Use    Smoking status: Never    Smokeless tobacco: Never   Vaping Use    Vaping Use: Never used   Substance Use Topics    Alcohol use: Not Currently     Comment: SOCIAL     Drug use: Never       Review of Systems   Constitutional:  Negative for chills and fever. HENT:  Negative for ear pain and sore throat. Eyes:  Negative for pain and visual disturbance. Respiratory:  Positive for cough and shortness of breath. Cardiovascular:  Negative for chest pain and palpitations. Gastrointestinal:  Negative for abdominal pain and vomiting. Genitourinary:  Negative for dysuria and hematuria. Musculoskeletal:  Negative for arthralgias and back pain. Skin:  Negative for color change and rash. Neurological:  Negative for seizures and syncope. All other systems reviewed and are negative. Physical Exam  Physical Exam  Vitals and nursing note reviewed.    Constitutional:       General: She is not in acute distress. Appearance: She is well-developed. HENT:      Head: Normocephalic and atraumatic. Eyes:      Conjunctiva/sclera: Conjunctivae normal.   Cardiovascular:      Rate and Rhythm: Normal rate and regular rhythm. Heart sounds: No murmur heard. Pulmonary:      Effort: Pulmonary effort is normal. No respiratory distress. Breath sounds: Wheezing present. Comments: Inspiratory and expiratory wheezing noted to bibasilar lung that is worst on the left side compared to right. Abdominal:      Palpations: Abdomen is soft. Tenderness: There is no abdominal tenderness. Musculoskeletal:         General: No swelling. Cervical back: Neck supple. Skin:     General: Skin is warm and dry. Capillary Refill: Capillary refill takes less than 2 seconds. Neurological:      Mental Status: She is alert.    Psychiatric:         Mood and Affect: Mood normal.         Vital Signs  ED Triage Vitals [11/05/23 1523]   Temperature Pulse Respirations Blood Pressure SpO2   98.9 °F (37.2 °C) 85 20 152/99 98 %      Temp Source Heart Rate Source Patient Position - Orthostatic VS BP Location FiO2 (%)   Oral Monitor Sitting Left arm --      Pain Score       --           Vitals:    11/05/23 1523   BP: 152/99   Pulse: 85   Patient Position - Orthostatic VS: Sitting         Visual Acuity      ED Medications  Medications   sodium chloride 0.9 % bolus 1,000 mL (1,000 mL Intravenous New Bag 11/5/23 1621)   ipratropium (ATROVENT) 0.02 % inhalation solution 0.5 mg (has no administration in time range)   albuterol inhalation solution 5 mg (has no administration in time range)   albuterol (PROVENTIL HFA,VENTOLIN HFA) inhaler 2 puff (has no administration in time range)   ipratropium (ATROVENT) 0.02 % inhalation solution 0.5 mg (0.5 mg Nebulization Given 11/5/23 1609)   albuterol inhalation solution 5 mg (5 mg Nebulization Given 11/5/23 1609)   magnesium sulfate 2 g/50 mL IVPB (premix) 2 g (2 g Intravenous New Bag 11/5/23 1614)   methylPREDNISolone sodium succinate (Solu-MEDROL) injection 60 mg (60 mg Intravenous Given 11/5/23 1611)   Hydrocod Anselmo-Chlorphe Anselmo ER (TUSSIONEX) ER suspension 5 mL (5 mL Oral Given 11/5/23 1549)       Diagnostic Studies  Results Reviewed       Procedure Component Value Units Date/Time    FLU/RSV/COVID - if FLU/RSV clinically relevant [[id number]]  (Normal) Collected: 11/05/23 1548    Lab Status: Final result Specimen: Nares from Nose Updated: 11/05/23 1639     SARS-CoV-2 Negative     INFLUENZA A PCR Negative     INFLUENZA B PCR Negative     RSV PCR Negative    Narrative:      FOR PEDIATRIC PATIENTS - copy/paste COVID Guidelines URL to browser: [email or web address]    SARS-CoV-2 assay is a Nucleic Acid Amplification assay intended for the  qualitative detection of nucleic acid from SARS-CoV-2 in nasopharyngeal  swabs. Results are for the presumptive identification of SARS-CoV-2 RNA. Positive results are indicative of infection with SARS-CoV-2, the virus  causing COVID-19, but do not rule out bacterial infection or co-infection  with other viruses. Laboratories within the [facility] and its  territories are required to report all positive results to the appropriate  public health authorities. Negative results do not preclude SARS-CoV-2  infection and should not be used as the sole basis for treatment or other  patient management decisions. Negative results must be combined with  clinical observations, patient history, and epidemiological information. This test has not been FDA cleared or approved. This test has been authorized by FDA under an Emergency Use Authorization  (EUA).  This test is only authorized for the duration of time the  declaration that circumstances exist justifying the authorization of the  emergency use of an in vitro diagnostic tests for detection of SARS-CoV-2  virus and/or diagnosis of COVID-19 infection under section 564(b)(1) of  the Act, 21 U. S.C. 151HVR-6(B)(2), unless the authorization is terminated  or revoked sooner. The test has been validated but independent review by FDA  and CLIA is pending. Test performed using [employer]: This RT-PCR assay targets N2,  a region unique to SARS-CoV-2. A conserved region in the E-gene was chosen  for pan-Sarbecovirus detection which includes SARS-CoV-2. According to CMS-2020-01-R, this platform meets the definition of high-throughput technology.     HS Troponin 0hr (reflex protocol) [[id number]]  (Normal) Collected: 11/05/23 1548    Lab Status: Final result Specimen: Blood from Arm, Left Updated: 11/05/23 1620     hs TnI 0hr 3 ng/L     HS Troponin I 2hr [[id number]]     Lab Status: No result Specimen: Blood     HS Troponin I 4hr [[id number]]     Lab Status: No result Specimen: Blood     B-Type Natriuretic Peptide(BNP) [[id number]]  (Abnormal) Collected: 11/05/23 1548    Lab Status: Final result Specimen: Blood from Arm, Left Updated: 11/05/23 1619      pg/mL     Comprehensive metabolic panel [[id number]]  (Abnormal) Collected: 11/05/23 1548    Lab Status: Final result Specimen: Blood from Arm, Left Updated: 11/05/23 1617     Sodium 129 mmol/L      Potassium 4.6 mmol/L      Chloride 98 mmol/L      CO2 25 mmol/L      ANION GAP 6 mmol/L      BUN 6 mg/dL      Creatinine 0.71 mg/dL      Glucose 104 mg/dL      Calcium 8.8 mg/dL      AST 18 U/L      ALT 16 U/L      Alkaline Phosphatase 37 U/L      Total Protein 6.6 g/dL      Albumin 4.2 g/dL      Total Bilirubin 0.52 mg/dL      eGFR 91 ml/min/1.73sq m     Narrative:      [place] guidelines for Chronic Kidney Disease (CKD):     Stage 1 with normal or high GFR (GFR > 90 mL/min/1.73 square meters)    Stage 2 Mild CKD (GFR = 60-89 mL/min/1.73 square meters)    Stage 3A Moderate CKD (GFR = 45-59 mL/min/1.73 square meters)    Stage 3B Moderate CKD (GFR = 30-44 mL/min/1.73 square meters)    Stage 4 Severe CKD (GFR = 15-29 mL/min/1.73 square meters)    Stage 5 End Stage CKD (GFR <15 mL/min/1.73 square meters)  Note: GFR calculation is accurate only with a steady state creatinine    CBC and differential [[id number]]  (Abnormal) Collected: 11/05/23 1548    Lab Status: Final result Specimen: Blood from Arm, Left Updated: 11/05/23 1558     WBC 6.39 Thousand/uL      RBC 4.33 Million/uL      Hemoglobin 14.0 g/dL      Hematocrit 38.9 %      MCV 90 fL      MCH 32.3 pg      MCHC 36.0 g/dL      RDW 11.6 %      MPV 8.8 fL      Platelets 356 Thousands/uL      nRBC 0 /100 WBCs      Neutrophils Relative 78 %      Immat GRANS % 0 %      Lymphocytes Relative 14 %      Monocytes Relative 6 %      Eosinophils Relative 2 %      Basophils Relative 0 %      Neutrophils Absolute 4.95 Thousands/µL      Immature Grans Absolute 0.02 Thousand/uL      Lymphocytes Absolute 0.86 Thousands/µL      Monocytes Absolute 0.40 Thousand/µL      Eosinophils Absolute 0.15 Thousand/µL      Basophils Absolute 0.01 Thousands/µL     UA w Reflex to Microscopic w Reflex to Culture [[id number]]     Lab Status: No result Specimen: Urine                    XR chest 2 views   ED Interpretation by Lora Espinal DO (11/05 1640)   No acute intrathoracic abnormalities noted. Procedures  ECG 12 Lead Documentation Only    Date/Time: 11/5/2023 3:37 PM    Performed by: Lora Espinal DO  Authorized by: Lora Espinal DO    Indications / Diagnosis:  Pain  ECG reviewed by me, the ED Provider: yes    Patient location:  ED  Previous ECG:     Previous ECG:  Unavailable  Interpretation:     Interpretation: abnormal    Comments:      Sinus rhythm, rate 74, normal axis, normal intervals, incomplete right bundle branch block pattern noted, no acute ST/T wave abnormalities noted, no previous EKG available for comparison.   61-year-old female with past history of asthma as a child, anxiety, depression, hypertension, hypothyroidism, Clare's disease,           ED Course  ED Course as of 11/05/23 1643   Sun Nov 05, 2023   1632 Sodium(!): 129  IV saline ordered. 7227 Patient reexamined at bedside. Patient started to feel much better with steroids and neb treatment. Only scattered wheezing is noted on auscultation of lungs bilaterally. We will repeat neb treatment. SBIRT 22yo+      Flowsheet Row Most Recent Value   Initial Alcohol Screen: US AUDIT-C     1. How often do you have a drink containing alcohol? 0 Filed at: 11/05/2023 1526   Audit-C Score 0 Filed at: 11/05/2023 1526   KAREN: How many times in the past year have you. .. Used an illegal drug or used a prescription medication for non-medical reasons? Never Filed at: 11/05/2023 1526                      Medical Decision Making  Obtain viral swab, chest x-ray, blood work, EKG  Give steroids, neb treatment, cough medicine, magnesium and continue to monitor patient for any worsening symptoms. Patient's lab work showed sodium of 129. IV fluid repletion started in the ED. Patient started to feel much better with steroids and neb treatment in the emergency department. Wheezing significantly improved. Repeat neb treatment ordered. Chest x-ray did not show any acute intrathoracic abnormality. At this time patient's coughing and wheezing is most likely secondary to acute bronchitis. Patient was placed on azithromycin for possible atypical pneumonia. Viral swabs are negative. Care patient signed out to the next attending will reassess patient after IV fluids and second treatments are done. At that time patient be discharged with follow-up to PCP. Amount and/or Complexity of Data Reviewed  Labs: ordered. Decision-making details documented in ED Course. Radiology: ordered and independent interpretation performed. Decision-making details documented in ED Course. ECG/medicine tests: ordered and independent interpretation performed. Decision-making details documented in ED Course. Risk  Prescription drug management. Disposition  Final diagnoses:   Acute bronchitis   History of asthma     Time reflects when diagnosis was documented in both MDM as applicable and the Disposition within this note       Time User Action Codes Description Comment    11/5/2023  4:20 PM Kimmy Mayorga Add [J20.9] Acute bronchitis     11/5/2023  4:20 PM Kimmy Mayorga Add [Z87.09] History of asthma           ED Disposition       ED Disposition   Discharge    Condition   Stable    Date/Time   Sun Nov 5, 2023 1632    [facility] Follow-up Information       Follow up With Specialties Details Why Ulises Cantu MD Family Medicine Schedule an appointment as soon as possible for a visit in 3 days Your sodium level was low today. We did give you IV saline bolus. Please discuss rechecking BMP next week with your family doctor.  [address] [place] [address]  [phone]              Patient's Medications   Discharge Prescriptions    ALBUTEROL (PROAIR HFA) 90 MCG/ACT INHALER    Inhale 2 puffs every 6 (six) hours as needed for wheezing       Start Date: 11/5/2023 End Date: --       Order Dose: 2 puffs       Quantity: 8.5 g    Refills: 0    AZITHROMYCIN (ZITHROMAX) 250 MG TABLET    Take 2 tablets today then 1 tablet daily x 4 days       Start Date: 11/5/2023 End Date: 11/9/2023       Order Dose: --       Quantity: 6 tablet    Refills: 0    HYDROCOD INDIRA-CHLORPHE INDIRA ER (TUSSIONEX) 10-8 MG/5 ML ER SUSPENSION    Take 5 mL by mouth every 12 (twelve) hours as needed for cough for up to 10 days       Start Date: 11/5/2023 End Date: 11/15/2023       Order Dose: 5 mL       Quantity: 115 mL    Refills: 0    PREDNISONE 20 MG TABLET    Take 2 tablets (40 mg total) by mouth daily for 4 days       Start Date: 11/5/2023 End Date: 11/9/2023       Order Dose: 40 mg Quantity: 8 tablet    Refills: 0       No discharge procedures on file.     PDMP Review       None            ED Provider  Electronically Signed by             Eddie Rodriguez DO  11/05/23 2657

## 2023-11-05 NOTE — ED NOTES
Provider at bedside to reeval pt cleared for and agreeable to discharge     Chance Schilling RN  11/05/23 2211

## 2023-11-06 NOTE — TELEPHONE ENCOUNTER
[address] [place] [address].
Advised patient to go to the ED based on her symptoms and medical history. Patient agreeable; is going to talk to her  about which hospital they will go to. Asked patient to call back with any further questions or concerns.
Patient scheduled
Please see how patient is feeling, and if she needs a follow up
Reason for Disposition   [1] MODERATE difficulty breathing (e.g., speaks in phrases, SOB even at rest, pulse 100-120) AND [2] NEW-onset or WORSE than normal    Additional Information   [1] Difficulty breathing AND [2] not from stuffy nose (e.g., not relieved by cleaning out the nose)    Answer Assessment - Initial Assessment Questions  1. ONSET: "When did the nasal discharge start?"       Started last Friday, over a week ago  2. AMOUNT: "How much discharge is there?"       Green  3. COUGH: "Do you have a cough?" If yes, ask: "Describe the color of your sputum" (clear, white, yellow, green)      Green  4. RESPIRATORY DISTRESS: "Describe your breathing."       Feels SOB, been going and getting worse. Even sitting here talking feels SOB. 5. FEVER: "Do you have a fever?" If Yes, ask: "What is your temperature, how was it measured, and when did it start?"      Denies  6. SEVERITY: "Overall, how bad are you feeling right now?" (e.g., doesn't interfere with normal activities, staying home from school/work, staying in bed)       Feels like getting worse  7. OTHER SYMPTOMS: "Do you have any other symptoms?" (e.g., sore throat, earache, wheezing, vomiting)      Coughing only with chest pain  8. PREGNANCY: "Is there any chance you are pregnant?" "When was your last menstrual period?"      N/A    Patient has history of asthma. Was on Plaquenil (been off for 9 months) and prednisone (about 2 months ago) for arthritis    Doesn't take any asthma medications.     Protocols used: Common Cold-ADULT-AH, Breathing Difficulty-ADULT-AH
PACU

## 2023-11-07 LAB
ATRIAL RATE: 74 BPM
P AXIS: 44 DEGREES
PR INTERVAL: 152 MS
QRS AXIS: -10 DEGREES
QRSD INTERVAL: 94 MS
QT INTERVAL: 394 MS
QTC INTERVAL: 437 MS
T WAVE AXIS: 58 DEGREES
VENTRICULAR RATE: 74 BPM

## 2023-11-07 PROCEDURE — 93010 ELECTROCARDIOGRAM REPORT: CPT | Performed by: INTERNAL MEDICINE

## 2023-11-09 ENCOUNTER — OFFICE VISIT (OUTPATIENT)
Dept: FAMILY MEDICINE CLINIC | Facility: CLINIC | Age: 63
End: 2023-11-09
Payer: COMMERCIAL

## 2023-11-09 VITALS
SYSTOLIC BLOOD PRESSURE: 186 MMHG | BODY MASS INDEX: 21.57 KG/M2 | TEMPERATURE: 98.6 F | OXYGEN SATURATION: 97 % | DIASTOLIC BLOOD PRESSURE: 106 MMHG | HEART RATE: 78 BPM | HEIGHT: 59 IN | RESPIRATION RATE: 16 BRPM | WEIGHT: 107 LBS

## 2023-11-09 DIAGNOSIS — J20.9 ACUTE BRONCHITIS: Primary | ICD-10-CM

## 2023-11-09 DIAGNOSIS — R03.0 ELEVATED BP WITHOUT DIAGNOSIS OF HYPERTENSION: ICD-10-CM

## 2023-11-09 DIAGNOSIS — E03.9 HYPOTHYROIDISM, UNSPECIFIED TYPE: ICD-10-CM

## 2023-11-09 DIAGNOSIS — H10.31 ACUTE CONJUNCTIVITIS OF RIGHT EYE, UNSPECIFIED ACUTE CONJUNCTIVITIS TYPE: ICD-10-CM

## 2023-11-09 DIAGNOSIS — Z87.09 HISTORY OF ASTHMA: ICD-10-CM

## 2023-11-09 PROCEDURE — 99214 OFFICE O/P EST MOD 30 MIN: CPT | Performed by: FAMILY MEDICINE

## 2023-11-09 RX ORDER — POLYMYXIN B SULFATE AND TRIMETHOPRIM 1; 10000 MG/ML; [USP'U]/ML
1 SOLUTION OPHTHALMIC EVERY 4 HOURS
Qty: 10 ML | Refills: 0 | Status: SHIPPED | OUTPATIENT
Start: 2023-11-09 | End: 2023-11-14

## 2023-11-09 RX ORDER — LEVOTHYROXINE SODIUM 0.12 MG/1
TABLET ORAL
Qty: 90 TABLET | Refills: 1 | Status: SHIPPED | OUTPATIENT
Start: 2023-11-09

## 2023-11-09 RX ORDER — AZITHROMYCIN 250 MG/1
TABLET, FILM COATED ORAL
Qty: 6 TABLET | Refills: 0 | Status: SHIPPED | OUTPATIENT
Start: 2023-11-09 | End: 2023-11-13

## 2023-11-09 NOTE — PROGRESS NOTES
Efren Middleton 1960 female MRN: [id number]    [address] [facility] OFFICE VISIT  [facility] - 2 [facility]      ASSESSMENT/PLAN  Efren Middleton is a 59 y.o. female presents to the office for    Diagnoses and all orders for this visit:    Acute bronchitis  -     azithromycin (ZITHROMAX) 250 mg tablet; Take 2 tablets today then 1 tablet daily x 4 days    History of asthma  -     azithromycin (ZITHROMAX) 250 mg tablet; Take 2 tablets today then 1 tablet daily x 4 days    Hypothyroidism, unspecified type    Elevated BP without diagnosis of hypertension    Acute conjunctivitis of right eye, unspecified acute conjunctivitis type  -     polymyxin b-trimethoprim (POLYTRIM) ophthalmic solution; Administer 1 drop to the right eye every 4 (four) hours for 5 days    Acute bronchitis continue on steroids to advise the patient unfortunately her blood pressure is elevated today therefore would not like to extend the steroids after today. Please start Z-Diaz. Recommend stopping cough syrup and using corcidin. Struve hypothyroidism currently controlled. Recommend a BP check in 2 weeks. Patient with a history of asthma likely an acute flare acute conjunctivitis the right I recommend treatment highly recommended the patient be seen by her ophthalmologist conjunctivitis           Future Appointments   Date Time Provider [address]   11/15/2023  1:40 PM EMF [place] NURSE 1 EMF [place] None   11/27/2023  8:45 AM Misty Kirby MD [facility]   12/13/2023  1:40 PM EMF [place] NURSE 1 EMF [place] None   12/13/2023  2:40 PM Maine Schofield PA-C EMF [place] None   1/16/2024 11:40 AM EMF [place] NURSE 1 EMF [place] None   2/13/2024 10:40 AM EMF [place] NURSE 1 EMF [place] None   2/14/2024  1:00 PM Stanton Lee MD NEURO [facility]   4/22/2024 12:30 PM Misty Kirby MD [facility]          SUBJECTIVE  CC: Wheezing      HPI:  Efren Middleton is a 59 y.o. female who presents for an acute/ ED follow up appointment. Cough, congestion, wheezing, sore throat. No Fever, diarrhea, constipation   States that her right eye has been a little swollen with discharge  Does not have a history of high blood pressure  Denies any chest pain or shortness of breath today  Taking her thyroid medications as prescribed  Review of Systems   Constitutional:  Positive for fatigue. Negative for activity change, appetite change, chills and fever. HENT:  Negative for congestion. Eyes:  Positive for redness. Respiratory:  Positive for cough, chest tightness and wheezing. Negative for shortness of breath. Cardiovascular:  Negative for chest pain and leg swelling. Gastrointestinal:  Negative for abdominal distention, abdominal pain, constipation, diarrhea, nausea and vomiting. All other systems reviewed and are negative.       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[facility])    • Hyperlipidemia    • Hypertension     new onset 05/2019   • Multiple benign polyps of large intestine    • Osteoarthritis    • Osteopenia    • Osteoporosis    • RA (rheumatoid arthritis) ([address])    • Sjogren's disease (HCC)          Medications:     Current Outpatient Medications:   •  azithromycin (ZITHROMAX) 250 mg tablet, Take 2 tablets today then 1 tablet daily x 4 days, Disp: 6 tablet, Rfl: 0  •  polymyxin b-trimethoprim (POLYTRIM) ophthalmic solution, Administer 1 drop to the right eye every 4 (four) hours for 5 days, Disp: 10 mL, Rfl: 0  •  albuterol (ProAir HFA) 90 mcg/act inhaler, Inhale 2 puffs every 6 (six) hours as needed for wheezing, Disp: 8.5 g, Rfl: 0  •  brimonidine-timolol (COMBIGAN) 0.2-0.5 %, INSTILL 1 DROP INTO RIGHT EYE TWICE A DAY, Disp: , Rfl:   •  buPROPion (WELLBUTRIN XL) 300 mg 24 hr tablet, TAKE 1 TABLET BY MOUTH EVERY DAY IN THE MORNING, Disp: 30 tablet, Rfl: 17  •  Cholecalciferol (VITAMIN D3) 2000 units CHEW, Daily, Disp: , Rfl:   •  Deutetrabenazine (Austedo) 9 MG TABS, Take 9 mg by mouth 2 (two) times a day, Disp: 60 tablet, Rfl: 8  •  diclofenac (VOLTAREN) 75 mg EC tablet, Take 1 tablet (75 mg total) by mouth 2 (two) times a day, Disp: 180 tablet, Rfl: 1  •  Docusate Calcium (STOOL SOFTENER PO), Take 100 mg by mouth daily , Disp: , Rfl:   •  ergocalciferol (VITAMIN D2) 50,000 units, Take 1 capsule (50,000 Units total) by mouth once a week for 12 doses, Disp: 12 capsule, Rfl: 0  •  escitalopram (LEXAPRO) 20 mg tablet, TAKE 1 TABLET BY MOUTH EVERY DAY, Disp: 30 tablet, Rfl: 8  •  famciclovir (FAMVIR) 250 MG tablet, TAKE 1 TABLET BY MOUTH EVERY DAY, Disp: 90 tablet, Rfl: 0  •  Fexofenadine HCl (ALLEGRA PO), Take by mouth daily as needed, Disp: , Rfl:   •  folic acid (FOLVITE) 1 mg tablet, Take 1 mg by mouth daily , Disp: , Rfl:   •  Hydrocod Anselmo-Chlorphe Anselmo ER (TUSSIONEX) 10-8 mg/5 mL ER suspension, Take 5 mL by mouth every 12 (twelve) hours as needed for cough for up to 10 days, Disp: 115 mL, Rfl: 0  •  latanoprost (XALATAN) 0.005 % ophthalmic solution, INSTILL 1 DROP INTO RIGHT EYE AT BEDTIME, Disp: , Rfl:   •  levothyroxine 125 mcg tablet, TAKE 1 TABLET BY MOUTH EVERY DAY, Disp: 90 tablet, Rfl: 1  •  omeprazole (PriLOSEC) 20 mg delayed release capsule, TAKE 1 CAPSULE BY MOUTH EVERY DAY, Disp: 30 capsule, Rfl: 2  •  Polyethyl Glycol-Propyl Glycol (SYSTANE OP), , Disp: , Rfl:   •  predniSONE 20 mg tablet, Take 2 tablets (40 mg total) by mouth daily for 4 days, Disp: 8 tablet, Rfl: 0  •  predniSONE 5 mg tablet, Take 1 tablet (5 mg total) by mouth daily (Patient not taking: Reported on 10/23/2023), Disp: 90 tablet, Rfl: 1  •  simvastatin (ZOCOR) 20 mg tablet, TAKE 1 TABLET BY MOUTH EVERY DAY, Disp: 30 tablet, Rfl: 8  •  tocilizumab (Actemra) 200 mg/10 mL, Infuse 8 mg/kg (402.8 mg) IV every 4 weeks;  Use in combination with 80 mg vials, Disp: 10 mL, Rfl: 6  •  tocilizumab (Actemra) 80 mg/4 mL, Inject 4 mL (80 mg total) into a catheter in a vein every 28 days Infuse 8 mg/kg (402.8 mg) IV every 4 weeks; use in combination with 200 mg vial, Disp: 12 mL, Rfl: 6    Allergies   Allergen Reactions   • Gold Itching   • Sulfa Antibiotics Hives and Rash   • Valacyclovir Palpitations       OBJECTIVE  Vitals:   Vitals:    11/09/23 1225   BP: (!) 186/106   Pulse: 78   Resp: 16   Temp: 98.6 °F (37 °C)   SpO2: 97%   Weight: 48.5 kg (107 lb)   Height: 4' 11" (1.499 m)         Physical Exam  Vitals reviewed. Constitutional:       Appearance: She is well-developed. HENT:      Head: Normocephalic and atraumatic. Right Ear: Tympanic membrane, ear canal and external ear normal.      Left Ear: Tympanic membrane, ear canal and external ear normal.      Nose: Nose normal.   Eyes:      General:         Right eye: Discharge present. Conjunctiva/sclera: Conjunctivae normal.      Pupils: Pupils are equal, round, and reactive to light. Cardiovascular:      Rate and Rhythm: Normal rate and regular rhythm. Heart sounds: Normal heart sounds. Pulmonary:      Effort: Pulmonary effort is normal. No respiratory distress. Breath sounds: Wheezing present. Musculoskeletal:         General: Normal range of motion. Cervical back: Normal range of motion and neck supple. Skin:     General: Skin is warm. Capillary Refill: Capillary refill takes less than 2 seconds. Neurological:      Mental Status: She is alert and oriented to person, place, and time.                     Kenan Cortez MD,   [facility]  11/9/2023

## 2023-11-13 DIAGNOSIS — K21.9 GASTROESOPHAGEAL REFLUX DISEASE, UNSPECIFIED WHETHER ESOPHAGITIS PRESENT: ICD-10-CM

## 2023-11-13 RX ORDER — OMEPRAZOLE 20 MG/1
20 CAPSULE, DELAYED RELEASE ORAL DAILY
Qty: 30 CAPSULE | Refills: 0 | Status: SHIPPED | OUTPATIENT
Start: 2023-11-13

## 2023-11-13 RX ORDER — OMEPRAZOLE 20 MG/1
20 CAPSULE, DELAYED RELEASE ORAL DAILY
Qty: 90 CAPSULE | Refills: 0 | Status: SHIPPED | OUTPATIENT
Start: 2023-11-13

## 2023-11-13 NOTE — TELEPHONE ENCOUNTER
Reason for call:   [x] Refill   [] Prior Auth  [] Other:     Office:   [] PCP/Provider -   [x] Specialty/Provider - Owen Dewey    Medication: omeprazole     Dose/Frequency: 20 mg daily    Quantity:   30D [facility]  90D supply [facility]    Pharmacy: [facility]    Does the patient have enough for 3 days?    [] Yes   [x] No - Send as HP to POD

## 2023-11-27 ENCOUNTER — OFFICE VISIT (OUTPATIENT)
Dept: FAMILY MEDICINE CLINIC | Facility: CLINIC | Age: 63
End: 2023-11-27
Payer: COMMERCIAL

## 2023-11-27 VITALS
BODY MASS INDEX: 22.3 KG/M2 | RESPIRATION RATE: 16 BRPM | SYSTOLIC BLOOD PRESSURE: 125 MMHG | TEMPERATURE: 98.1 F | HEIGHT: 59 IN | DIASTOLIC BLOOD PRESSURE: 72 MMHG | OXYGEN SATURATION: 96 % | HEART RATE: 65 BPM | WEIGHT: 110.6 LBS

## 2023-11-27 DIAGNOSIS — R03.0 ELEVATED BP WITHOUT DIAGNOSIS OF HYPERTENSION: ICD-10-CM

## 2023-11-27 DIAGNOSIS — J45.20 MILD INTERMITTENT ASTHMA WITHOUT COMPLICATION: Primary | ICD-10-CM

## 2023-11-27 PROCEDURE — 99213 OFFICE O/P EST LOW 20 MIN: CPT | Performed by: FAMILY MEDICINE

## 2023-11-28 NOTE — PROGRESS NOTES
Vivian Osgood 1960 female MRN: [id number]    [facility] PRACTICE OFFICE VISIT  [facility] - [address] [facility] [place]      ASSESSMENT/PLAN  Vivian Osgood is a 59 y.o. female presents to the office for    Diagnoses and all orders for this visit:    Mild intermittent asthma without complication    Elevated BP without diagnosis of hypertension       At this time blood pressure is stable  Asthma is currently stable there was wheezing on exam but cleared with a cough highly recommend if something changes to contact our office. At this time we will continue following up every year for physical exams           Future Appointments   Date Time Provider [address]   12/13/2023  1:40 PM EM[facility] [place] NURSE 1 EMF [place] None   12/13/2023  2:40 PM Gila Fernandez PA-C EMF [place] None   1/16/2024 11:40 AM EMF [place] NURSE 1 EMF [place] None   2/13/2024 10:40 AM EM[facility] [place] NURSE 1 EMF [place] None   2/14/2024  1:00 PM Charles Mcnair MD NEURO [place] [facility]   4/22/2024 12:30 PM Michelle Paul MD [facility]          SUBJECTIVE  CC: Follow-up (Bp check)      HPI:  Vivian Osgood is a 59 y.o. female who presents for a follow-up appointment. Patient had elevated blood pressure while having treatment for acute bronchitis. She states that from a breathing standpoint she is doing tremendously well. She states that she feels like she is back at her baseline and her blood pressure has been well controlled. Review of Systems   Constitutional:  Negative for activity change, appetite change, chills, fatigue and fever. HENT:  Negative for congestion. Respiratory:  Negative for cough, chest tightness and shortness of breath. Cardiovascular:  Negative for chest pain and leg swelling. Gastrointestinal:  Negative for abdominal distention, abdominal pain, constipation, diarrhea, nausea and vomiting. All other systems reviewed and are negative.       Historical Information   The patient history was reviewed as follows:  Past Medical History:   Diagnosis Date   • Allergic rhinitis    • Anxiety    • Colitis    • Colon polyps     + history for colon polyps   • Depression    • Disease of thyroid gland    • Full dentures    • GERD (gastroesophageal reflux disease)    • Glaucoma    • Glaucoma    • Herpes gingivostomatitis    • Herpes simplex infection    • [place]hill's disease ([address])    • Hyperlipidemia    • Hypertension     new onset 05/2019   • Multiple benign polyps of large intestine    • Osteoarthritis    • Osteopenia    • Osteoporosis    • RA (rheumatoid arthritis) ([facility])    • Sjogren's disease ([facility])          Medications:     Current Outpatient Medications:   •  albuterol (ProAir HFA) 90 mcg/act inhaler, Inhale 2 puffs every 6 (six) hours as needed for wheezing, Disp: 8.5 g, Rfl: 0  •  brimonidine-timolol (COMBIGAN) 0.2-0.5 %, INSTILL 1 DROP INTO RIGHT EYE TWICE A DAY, Disp: , Rfl:   •  buPROPion (WELLBUTRIN XL) 300 mg 24 hr tablet, TAKE 1 TABLET BY MOUTH EVERY DAY IN THE MORNING, Disp: 30 tablet, Rfl: 17  •  Cholecalciferol (VITAMIN D3) 2000 units CHEW, Daily, Disp: , Rfl:   •  Deutetrabenazine (Austedo) 9 MG TABS, Take 9 mg by mouth 2 (two) times a day, Disp: 60 tablet, Rfl: 8  •  diclofenac (VOLTAREN) 75 mg EC tablet, Take 1 tablet (75 mg total) by mouth 2 (two) times a day, Disp: 180 tablet, Rfl: 1  •  Docusate Calcium (STOOL SOFTENER PO), Take 100 mg by mouth daily , Disp: , Rfl:   •  ergocalciferol (VITAMIN D2) 50,000 units, Take 1 capsule (50,000 Units total) by mouth once a week for 12 doses, Disp: 12 capsule, Rfl: 0  •  escitalopram (LEXAPRO) 20 mg tablet, TAKE 1 TABLET BY MOUTH EVERY DAY, Disp: 30 tablet, Rfl: 8  •  famciclovir (FAMVIR) 250 MG tablet, TAKE 1 TABLET BY MOUTH EVERY DAY, Disp: 30 tablet, Rfl: 2  •  Fexofenadine HCl (ALLEGRA PO), Take by mouth daily as needed, Disp: , Rfl:   •  folic acid (FOLVITE) 1 mg tablet, Take 1 mg by mouth daily , Disp: , Rfl:   •  latanoprost (XALATAN) 0.005 % ophthalmic solution, INSTILL 1 DROP INTO RIGHT EYE AT BEDTIME, Disp: , Rfl:   •  levothyroxine 125 mcg tablet, TAKE 1 TABLET BY MOUTH EVERY DAY, Disp: 90 tablet, Rfl: 1  •  omeprazole (PriLOSEC) 20 mg delayed release capsule, Take 1 capsule (20 mg total) by mouth daily, Disp: 30 capsule, Rfl: 0  •  Polyethyl Glycol-Propyl Glycol (SYSTANE OP), , Disp: , Rfl:   •  predniSONE 5 mg tablet, Take 1 tablet (5 mg total) by mouth daily (Patient taking differently: Take 2.5 mg by mouth daily), Disp: 90 tablet, Rfl: 1  •  simvastatin (ZOCOR) 20 mg tablet, TAKE 1 TABLET BY MOUTH EVERY DAY, Disp: 30 tablet, Rfl: 8  •  tocilizumab (Actemra) 200 mg/10 mL, Infuse 8 mg/kg (402.8 mg) IV every 4 weeks;  Use in combination with 80 mg vials, Disp: 10 mL, Rfl: 6  •  tocilizumab (Actemra) 80 mg/4 mL, Inject 4 mL (80 mg total) into a catheter in a vein every 28 days Infuse 8 mg/kg (402.8 mg) IV every 4 weeks; use in combination with 200 mg vial, Disp: 12 mL, Rfl: 6    Allergies   Allergen Reactions   • Gold Itching   • Sulfa Antibiotics Hives and Rash   • Valacyclovir Palpitations       OBJECTIVE  Vitals:   Vitals:    11/27/23 0854   BP: 125/72   BP Location: Left arm   Patient Position: Sitting   Cuff Size: Standard   Pulse: 65   Resp: 16   Temp: 98.1 °F (36.7 °C)   SpO2: 96%   Weight: 50.2 kg (110 lb 9.6 oz)   Height: 4' 11" (1.499 m)         Physical Exam  Vitals reviewed. Constitutional:       Appearance: She is well-developed. HENT:      Head: Normocephalic and atraumatic. Eyes:      Conjunctiva/sclera: Conjunctivae normal.      Pupils: Pupils are equal, round, and reactive to light. Cardiovascular:      Rate and Rhythm: Normal rate and regular rhythm. Heart sounds: Normal heart sounds. Pulmonary:      Effort: Pulmonary effort is normal. No respiratory distress. Breath sounds: Wheezing present.       Comments: Wheezing did clear with a cough on the right  Musculoskeletal:         General: Normal range of motion. Cervical back: Normal range of motion and neck supple. Skin:     General: Skin is warm. Capillary Refill: Capillary refill takes less than 2 seconds. Neurological:      Mental Status: She is alert and oriented to person, place, and time.                     Татьяна Santiago MD,   [facility]  11/27/2023

## 2023-12-08 DIAGNOSIS — G10 HUNTINGTON DISEASE (HCC): ICD-10-CM

## 2023-12-11 DIAGNOSIS — K21.9 GASTROESOPHAGEAL REFLUX DISEASE, UNSPECIFIED WHETHER ESOPHAGITIS PRESENT: ICD-10-CM

## 2023-12-11 RX ORDER — OMEPRAZOLE 20 MG/1
20 CAPSULE, DELAYED RELEASE ORAL DAILY
Qty: 90 CAPSULE | Refills: 0 | Status: SHIPPED | OUTPATIENT
Start: 2023-12-11

## 2023-12-11 RX ORDER — DEUTETRABENAZINE 9 MG/1
TABLET, COATED ORAL
Qty: 60 TABLET | Refills: 8 | Status: SHIPPED | OUTPATIENT
Start: 2023-12-11

## 2023-12-11 NOTE — TELEPHONE ENCOUNTER
Received fax from [facility] for a 90 day script for Omeprazole 20 MG Capsule. To be sent to [facility] [address] Phone [phone] or Fax [phone]. Thank you.

## 2023-12-19 ENCOUNTER — TELEPHONE (OUTPATIENT)
Dept: FAMILY MEDICINE CLINIC | Facility: CLINIC | Age: 63
End: 2023-12-19

## 2023-12-19 NOTE — TELEPHONE ENCOUNTER
Dr. Emerson:    Patient's  dropped of [employer] placard paperwork for your review and signature.  Please advise when forms are ready for pickup.

## 2023-12-20 ENCOUNTER — TELEPHONE (OUTPATIENT)
Dept: INFUSION CENTER | Facility: HOSPITAL | Age: 63
End: 2023-12-20

## 2023-12-20 NOTE — TELEPHONE ENCOUNTER
Called patient and scheduled for Jan 16th at 2pm at the [facility].  Provided with directions and phone number to the unit in the event she has questions or concerns.

## 2023-12-22 ENCOUNTER — TELEPHONE (OUTPATIENT)
Dept: FAMILY MEDICINE CLINIC | Facility: CLINIC | Age: 63
End: 2023-12-22

## 2023-12-22 DIAGNOSIS — Z87.09 HISTORY OF ASTHMA: ICD-10-CM

## 2023-12-22 DIAGNOSIS — F32.A DEPRESSION, UNSPECIFIED DEPRESSION TYPE: ICD-10-CM

## 2023-12-22 DIAGNOSIS — Z87.09 HISTORY OF ASTHMA: Primary | ICD-10-CM

## 2023-12-22 DIAGNOSIS — J20.9 ACUTE BRONCHITIS: ICD-10-CM

## 2023-12-22 RX ORDER — BUPROPION HYDROCHLORIDE 300 MG/1
300 TABLET ORAL EVERY MORNING
Qty: 90 TABLET | Refills: 1 | Status: SHIPPED | OUTPATIENT
Start: 2023-12-22

## 2023-12-22 RX ORDER — FEXOFENADINE HCL 180 MG/1
180 TABLET ORAL DAILY PRN
Qty: 30 TABLET | Refills: 6 | Status: SHIPPED | OUTPATIENT
Start: 2023-12-22

## 2023-12-22 RX ORDER — FEXOFENADINE HCL 180 MG/1
180 TABLET ORAL DAILY PRN
Qty: 30 TABLET | Refills: 6 | Status: SHIPPED | OUTPATIENT
Start: 2023-12-22 | End: 2023-12-22 | Stop reason: SDUPTHER

## 2023-12-22 RX ORDER — ALBUTEROL SULFATE 90 UG/1
2 AEROSOL, METERED RESPIRATORY (INHALATION) EVERY 6 HOURS PRN
Qty: 8.5 G | Refills: 5 | Status: SHIPPED | OUTPATIENT
Start: 2023-12-22

## 2023-12-22 RX ORDER — ESCITALOPRAM OXALATE 20 MG/1
20 TABLET ORAL DAILY
Qty: 90 TABLET | Refills: 1 | Status: SHIPPED | OUTPATIENT
Start: 2023-12-22

## 2023-12-22 NOTE — TELEPHONE ENCOUNTER
Patient would like refill on Fexofenadine HCI  Take by mouth daily as needed (not on current med list)       Pharmacy: [facility] [id number]                      Phone [phone]

## 2023-12-22 NOTE — TELEPHONE ENCOUNTER
NOT a duplicate- [facility] and will need 90 day supplies for all medication.     Reason for call:   [x] Refill   [] Prior Auth  [] Other:     Office:   [x] PCP/Provider -   [] Specialty/Provider -     Medication:   Escitalopram 20mg tablet- Take 1 tablet by mouth every day  Buproprion 300mg- take 1 tablet by mouth every day  Albuterol 90mcg inhaler- Inhale 2 puffs every 6 hours as needed for wheezing  Fexofenadine HCI (allegra PO)- Take by mouth daily as needed (not on current med list)      Pharmacy: [facility]    Does the patient have enough for 3 days?   [x] Yes   [] No - Send as HP to POD

## 2024-01-03 DIAGNOSIS — H10.31 ACUTE CONJUNCTIVITIS OF RIGHT EYE, UNSPECIFIED ACUTE CONJUNCTIVITIS TYPE: ICD-10-CM

## 2024-01-04 RX ORDER — POLYMYXIN B SULFATE AND TRIMETHOPRIM 1; 10000 MG/ML; [USP'U]/ML
1 SOLUTION OPHTHALMIC EVERY 4 HOURS
Qty: 10 ML | Refills: 0 | Status: SHIPPED | OUTPATIENT
Start: 2024-01-04 | End: 2024-01-09

## 2024-01-05 NOTE — TELEPHONE ENCOUNTER
Please let pt know that I approved refill of the eye drops requested but if not effective please ask pt to call for appt for evaluation--thanks

## 2024-01-08 ENCOUNTER — TELEPHONE (OUTPATIENT)
Age: 64
End: 2024-01-08

## 2024-01-08 NOTE — TELEPHONE ENCOUNTER
Called [facility] [phone] to arrange delivery of Actemra to [facility] for her infusion 1-16-24, however, was told by [facility] that they delivered the medication to the patient's home on 1-3-24.  I called an left a message for the patient to call me.

## 2024-01-09 NOTE — TELEPHONE ENCOUNTER
Patient called asking to speak with Cindy again further. She stated [facility] should not be involved in her case anymore. That she wants to have her injections set up at the [place] [facility] only. Please advise

## 2024-01-09 NOTE — TELEPHONE ENCOUNTER
Called [facility] BC/BS to talk about Actemra infusions.  I first talked with Jnaene [phone].  She quoted me benefits if billing as professional provider:  Covered 100%, no prior auth required.  The employer opted out of medical injectable initiative.  REF#  [id number].  Janene transferred me to Angus [phone] who quoted me benefits if billing as outpatient facility:  Covered 100%, no prior auth required.  Same reference # [id number].  May buy and bill.

## 2024-01-09 NOTE — TELEPHONE ENCOUNTER
Caller: Roberta     Doctor/Office: Field    Call regarding :  Patient called in following up on the previous message  . Transfer the call to [facility].      Call was transferred to:  [facility]

## 2024-01-11 RX ORDER — SODIUM CHLORIDE 9 MG/ML
20 INJECTION, SOLUTION INTRAVENOUS CONTINUOUS
Status: DISCONTINUED | OUTPATIENT
Start: 2024-01-16 | End: 2024-01-19 | Stop reason: HOSPADM

## 2024-01-16 ENCOUNTER — HOSPITAL ENCOUNTER (OUTPATIENT)
Dept: INFUSION CENTER | Facility: CLINIC | Age: 64
Discharge: HOME | End: 2024-01-16
Payer: COMMERCIAL

## 2024-01-16 VITALS
SYSTOLIC BLOOD PRESSURE: 149 MMHG | WEIGHT: 104.5 LBS | DIASTOLIC BLOOD PRESSURE: 88 MMHG | TEMPERATURE: 97.5 F | HEART RATE: 99 BPM | BODY MASS INDEX: 21.11 KG/M2 | RESPIRATION RATE: 18 BRPM | OXYGEN SATURATION: 96 %

## 2024-01-16 PROCEDURE — 96413 CHEMO IV INFUSION 1 HR: CPT

## 2024-01-16 RX ADMIN — TOCILIZUMAB 400 MG: 20 INJECTION, SOLUTION, CONCENTRATE INTRAVENOUS at 15:05

## 2024-01-16 RX ADMIN — SODIUM CHLORIDE 20 ML/HR: 0.9 INJECTION, SOLUTION INTRAVENOUS at 14:25

## 2024-01-16 NOTE — PROGRESS NOTES
Tolerated infusion without incident: No adverse reactions noted: No further appt's scheduled: Will notify unit if needed: AVS offered and declined

## 2024-01-16 NOTE — PROGRESS NOTES
Patient to Infusion Center for Actemra: Offers no complaints at present time: Lab work ( 11/05/23 ) reviewed: Within parameters to treat: Left AC PIV initiated without incident

## 2024-01-24 ENCOUNTER — NEW PATIENT COMPREHENSIVE (OUTPATIENT)
Dept: URBAN - METROPOLITAN AREA CLINIC 6 | Facility: CLINIC | Age: 64
End: 2024-01-24

## 2024-01-24 DIAGNOSIS — H40.1113: ICD-10-CM

## 2024-01-24 DIAGNOSIS — H40.1122: ICD-10-CM

## 2024-01-24 DIAGNOSIS — H25.13: ICD-10-CM

## 2024-01-24 PROCEDURE — 92004 COMPRE OPH EXAM NEW PT 1/>: CPT

## 2024-01-24 ASSESSMENT — TONOMETRY
OD_IOP_MMHG: 51
OD_IOP_MMHG: 49
OS_IOP_MMHG: 17
OD_IOP_MMHG: 30
OD_IOP_MMHG: 7

## 2024-01-24 ASSESSMENT — VISUAL ACUITY
OD_CC: CF 3FT
OS_CC: 20/30-2

## 2024-01-25 ENCOUNTER — IOP CHECK (OUTPATIENT)
Dept: URBAN - METROPOLITAN AREA CLINIC 6 | Facility: CLINIC | Age: 64
End: 2024-01-25

## 2024-01-25 DIAGNOSIS — H40.1122: ICD-10-CM

## 2024-01-25 DIAGNOSIS — H40.1113: ICD-10-CM

## 2024-01-25 PROCEDURE — 92012 INTRM OPH EXAM EST PATIENT: CPT

## 2024-01-25 PROCEDURE — 65800 DRAINAGE OF EYE: CPT

## 2024-01-25 ASSESSMENT — VISUAL ACUITY
OD_CC: 20/400
OS_CC: 20/40

## 2024-01-25 ASSESSMENT — TONOMETRY
OD_IOP_MMHG: 41
OS_IOP_MMHG: 18
OD_IOP_MMHG: 4

## 2024-01-30 ENCOUNTER — IOP CHECK (OUTPATIENT)
Dept: URBAN - METROPOLITAN AREA CLINIC 6 | Facility: CLINIC | Age: 64
End: 2024-01-30

## 2024-01-30 DIAGNOSIS — H40.1122: ICD-10-CM

## 2024-01-30 DIAGNOSIS — H40.1113: ICD-10-CM

## 2024-01-30 DIAGNOSIS — H04.121: ICD-10-CM

## 2024-01-30 DIAGNOSIS — H25.13: ICD-10-CM

## 2024-01-30 DIAGNOSIS — E05.00: ICD-10-CM

## 2024-01-30 PROCEDURE — 99214 OFFICE O/P EST MOD 30 MIN: CPT

## 2024-01-30 PROCEDURE — 92020 GONIOSCOPY: CPT

## 2024-01-30 PROCEDURE — 92133 CPTRZD OPH DX IMG PST SGM ON: CPT

## 2024-01-30 ASSESSMENT — TONOMETRY
OS_IOP_MMHG: 16
OD_IOP_MMHG: 30

## 2024-01-30 ASSESSMENT — VISUAL ACUITY
OD_CC: 20/200+1
OS_CC: 20/40-2

## 2024-02-06 ENCOUNTER — IOP CHECK (OUTPATIENT)
Dept: URBAN - METROPOLITAN AREA CLINIC 6 | Facility: CLINIC | Age: 64
End: 2024-02-06

## 2024-02-06 DIAGNOSIS — H04.121: ICD-10-CM

## 2024-02-06 DIAGNOSIS — H40.1122: ICD-10-CM

## 2024-02-06 DIAGNOSIS — H25.13: ICD-10-CM

## 2024-02-06 DIAGNOSIS — H40.1113: ICD-10-CM

## 2024-02-06 PROCEDURE — 92012 INTRM OPH EXAM EST PATIENT: CPT

## 2024-02-06 ASSESSMENT — VISUAL ACUITY
OS_SC: 20/200
OD_SC: 20/400
OS_PH: 20/80-2

## 2024-02-06 ASSESSMENT — TONOMETRY
OD_IOP_MMHG: 33
OS_IOP_MMHG: 14

## 2024-02-13 ENCOUNTER — HOSPITAL ENCOUNTER (OUTPATIENT)
Dept: INFUSION CENTER | Facility: CLINIC | Age: 64
End: 2024-02-13

## 2024-02-14 ENCOUNTER — OFFICE VISIT (OUTPATIENT)
Dept: FAMILY MEDICINE CLINIC | Facility: CLINIC | Age: 64
End: 2024-02-14
Payer: COMMERCIAL

## 2024-02-14 VITALS
RESPIRATION RATE: 14 BRPM | DIASTOLIC BLOOD PRESSURE: 90 MMHG | TEMPERATURE: 98.4 F | HEIGHT: 59 IN | SYSTOLIC BLOOD PRESSURE: 150 MMHG | HEART RATE: 66 BPM | WEIGHT: 108 LBS | OXYGEN SATURATION: 98 % | BODY MASS INDEX: 21.77 KG/M2

## 2024-02-14 DIAGNOSIS — R03.0 ELEVATED BP WITHOUT DIAGNOSIS OF HYPERTENSION: ICD-10-CM

## 2024-02-14 DIAGNOSIS — E78.00 HYPERCHOLESTEROLEMIA: ICD-10-CM

## 2024-02-14 DIAGNOSIS — Z01.818 PREOP EXAMINATION: Primary | ICD-10-CM

## 2024-02-14 DIAGNOSIS — G10 HUNTINGTON DISEASE (HCC): ICD-10-CM

## 2024-02-14 DIAGNOSIS — F32.0 MAJOR DEPRESSIVE DISORDER, SINGLE EPISODE, MILD (HCC): ICD-10-CM

## 2024-02-14 DIAGNOSIS — H40.9 GLAUCOMA OF RIGHT EYE, UNSPECIFIED GLAUCOMA TYPE: ICD-10-CM

## 2024-02-14 DIAGNOSIS — E89.0 POSTABLATIVE HYPOTHYROIDISM: ICD-10-CM

## 2024-02-14 PROBLEM — K29.70 GASTRITIS WITHOUT BLEEDING: Status: RESOLVED | Noted: 2020-12-09 | Resolved: 2024-02-14

## 2024-02-14 PROCEDURE — 99215 OFFICE O/P EST HI 40 MIN: CPT | Performed by: FAMILY MEDICINE

## 2024-02-14 RX ORDER — LOTEPREDNOL ETABONATE 5 MG/ML
SUSPENSION/ DROPS OPHTHALMIC
COMMUNITY
Start: 2024-02-10

## 2024-02-14 RX ORDER — ACETAZOLAMIDE 250 MG/1
250 TABLET ORAL 4 TIMES DAILY
COMMUNITY
Start: 2024-02-08

## 2024-02-14 RX ORDER — NETARSUDIL 0.2 MG/ML
SOLUTION/ DROPS OPHTHALMIC; TOPICAL
COMMUNITY
Start: 2023-12-13

## 2024-02-14 NOTE — PROGRESS NOTES
Chief Complaint   Patient presents with   • Pre-op Exam     Glaucoma rt eye 2-15-23        Patient ID: Roberta Pierre is a 63 y.o. female.    HPI  Pt is seeing for preop exam prior to R eye glaucoma surgery     The following portions of the patient's history were reviewed and updated as appropriate: allergies, current medications, past family history, past medical history, past social history, past surgical history and problem list.    Review of Systems   Constitutional: Negative.    Respiratory: Negative.     Cardiovascular: Negative.    Gastrointestinal: Negative.    Genitourinary: Negative.    Musculoskeletal: Negative.    Skin: Negative.    Neurological: Negative.        Current Outpatient Medications   Medication Sig Dispense Refill   • acetaZOLAMIDE (DIAMOX) 250 mg tablet Take 250 mg by mouth 4 (four) times a day     • albuterol (ProAir HFA) 90 mcg/act inhaler Inhale 2 puffs every 6 (six) hours as needed for wheezing 8.5 g 5   • Austedo 9 MG TABS TAKE 1 TABLET TWO TIMES A DAY 60 tablet 8   • brimonidine-timolol (COMBIGAN) 0.2-0.5 % INSTILL 1 DROP INTO RIGHT EYE TWICE A DAY     • buPROPion (WELLBUTRIN XL) 300 mg 24 hr tablet Take 1 tablet (300 mg total) by mouth every morning 90 tablet 1   • Cholecalciferol (VITAMIN D3) 2000 units CHEW Daily     • diclofenac (VOLTAREN) 75 mg EC tablet Take 1 tablet (75 mg total) by mouth 2 (two) times a day 180 tablet 1   • Docusate Calcium (STOOL SOFTENER PO) Take 100 mg by mouth daily      • escitalopram (LEXAPRO) 20 mg tablet Take 1 tablet (20 mg total) by mouth daily 90 tablet 1   • famciclovir (FAMVIR) 250 MG tablet TAKE 1 TABLET BY MOUTH EVERY DAY 30 tablet 2   • fexofenadine (ALLEGRA) 180 MG tablet Take 1 tablet (180 mg total) by mouth daily as needed (allergy) 30 tablet 6   • folic acid (FOLVITE) 1 mg tablet Take 1 mg by mouth daily      • latanoprost (XALATAN) 0.005 % ophthalmic solution INSTILL 1 DROP INTO RIGHT EYE AT BEDTIME     • levothyroxine 125 mcg tablet TAKE  "1 TABLET BY MOUTH EVERY DAY 90 tablet 1   • loteprednol etabonate (LOTEMAX) 0.5 % ophthalmic suspension      • omeprazole (PriLOSEC) 20 mg delayed release capsule Take 1 capsule (20 mg total) by mouth daily 90 capsule 0   • Polyethyl Glycol-Propyl Glycol (SYSTANE OP)      • predniSONE 5 mg tablet Take 1/2 tab po daily 45 tablet 1   • Rhopressa 0.02 % SOLN INSTILL 1 DROP INTO RIGHT EYE AT BEDTIME     • simvastatin (ZOCOR) 20 mg tablet TAKE 1 TABLET BY MOUTH EVERY DAY 30 tablet 8   • tocilizumab (Actemra) 200 mg/10 mL Infuse 8 mg/kg (402.8 mg) IV every 4 weeks;  Use in combination with 80 mg vials 10 mL 6   • tocilizumab (Actemra) 80 mg/4 mL Inject 4 mL (80 mg total) into a catheter in a vein every 28 days Infuse 8 mg/kg (402.8 mg) IV every 4 weeks; use in combination with 200 mg vial 12 mL 6   • ergocalciferol (VITAMIN D2) 50,000 units Take 1 capsule (50,000 Units total) by mouth once a week for 12 doses 12 capsule 0     No current facility-administered medications for this visit.       Objective:    /90 Comment: by MD  Pulse 66   Temp 98.4 °F (36.9 °C) (Temporal)   Resp 14   Ht 4' 11\" (1.499 m)   Wt 49 kg (108 lb)   SpO2 98%   BMI 21.81 kg/m²        Physical Exam  Constitutional:       General: She is not in acute distress.     Appearance: She is not ill-appearing.   Cardiovascular:      Rate and Rhythm: Normal rate and regular rhythm.      Heart sounds: No murmur heard.  Pulmonary:      Effort: Pulmonary effort is normal. No respiratory distress.      Breath sounds: No wheezing, rhonchi or rales.   Abdominal:      Palpations: Abdomen is soft.      Tenderness: There is no abdominal tenderness.   Musculoskeletal:      Right lower leg: No edema.      Left lower leg: No edema.   Neurological:      General: No focal deficit present.      Mental Status: She is alert and oriented to person, place, and time.   Psychiatric:         Mood and Affect: Mood normal.         Thought Content: Thought content normal.   "       Judgment: Judgment normal.           Labs in chart were reviewed.      Assessment/Plan:         Diagnoses and all orders for this visit:    Preop examination    Cleared for surgery   Glaucoma of right eye, unspecified glaucoma type    Major depressive disorder, single episode, mild (HCC)    Kenney disease (HCC)    Postablative hypothyroidism    Hypercholesterolemia    Elevated BP without diagnosis of hypertension    Other orders  -     Rhopressa 0.02 % SOLN; INSTILL 1 DROP INTO RIGHT EYE AT BEDTIME  -     loteprednol etabonate (LOTEMAX) 0.5 % ophthalmic suspension  -     acetaZOLAMIDE (DIAMOX) 250 mg tablet; Take 250 mg by mouth 4 (four) times a day          Rto prn                 Anny Palomares MD

## 2024-02-15 ENCOUNTER — SURGERY/PROCEDURE (OUTPATIENT)
Dept: URBAN - METROPOLITAN AREA SURGICAL CENTER 6 | Facility: SURGICAL CENTER | Age: 64
End: 2024-02-15

## 2024-02-15 DIAGNOSIS — H40.1112: ICD-10-CM

## 2024-02-15 PROCEDURE — 66180 AQUEOUS SHUNT EYE W/GRAFT: CPT

## 2024-02-16 ENCOUNTER — 1 DAY POST-OP (OUTPATIENT)
Dept: URBAN - METROPOLITAN AREA CLINIC 6 | Facility: CLINIC | Age: 64
End: 2024-02-16

## 2024-02-16 DIAGNOSIS — H11.31: ICD-10-CM

## 2024-02-16 DIAGNOSIS — Z98.890: ICD-10-CM

## 2024-02-16 PROCEDURE — 99024 POSTOP FOLLOW-UP VISIT: CPT

## 2024-02-16 ASSESSMENT — VISUAL ACUITY
OS_SC: 20/200
OS_PH: 20/70
OD_SC: 20/400

## 2024-02-16 ASSESSMENT — TONOMETRY: OD_IOP_MMHG: 10

## 2024-02-21 ENCOUNTER — TELEPHONE (OUTPATIENT)
Age: 64
End: 2024-02-21

## 2024-02-21 ENCOUNTER — TELEPHONE (OUTPATIENT)
Dept: FAMILY MEDICINE CLINIC | Facility: CLINIC | Age: 64
End: 2024-02-21

## 2024-02-21 DIAGNOSIS — M05.79 RHEUMATOID ARTHRITIS INVOLVING MULTIPLE SITES WITH POSITIVE RHEUMATOID FACTOR (HCC): Primary | ICD-10-CM

## 2024-02-21 RX ORDER — SODIUM CHLORIDE 9 MG/ML
20 INJECTION, SOLUTION INTRAVENOUS ONCE
Status: CANCELLED | OUTPATIENT
Start: 2024-02-27

## 2024-02-21 NOTE — TELEPHONE ENCOUNTER
Pt returning your call. She stated she did not have the labs done so she is going to have to reschedule her infusion for 2/22. She said she would get the labs done before her next infusion. She stated the infusion center was already closed so she is going to call them tomorrow to reschedule that.    done

## 2024-02-21 NOTE — TELEPHONE ENCOUNTER
----- Message from Alecia Rob MD sent at 2/21/2024  3:53 PM EST -----  Regarding: RE: [facility] orders  Orders in [place]. She's overdue for labs. Please ask her to have drawn asap. Thx.  ----- Message -----  From: Samira Hdez RN  Sent: 2/19/2024  10:06 AM EST  To: Genna Aponte PA-C; Alecia Rob MD; #  Subject: [facility] orders                                    Him,  Patient scheduled 2/22 for actemra, can you place orders in [facility]?  Thanks

## 2024-02-22 ENCOUNTER — HOSPITAL ENCOUNTER (OUTPATIENT)
Dept: INFUSION CENTER | Facility: CLINIC | Age: 64
Discharge: HOME | End: 2024-02-22

## 2024-02-23 ENCOUNTER — 1 WEEK POST-OP (OUTPATIENT)
Dept: URBAN - METROPOLITAN AREA CLINIC 6 | Facility: CLINIC | Age: 64
End: 2024-02-23

## 2024-02-23 ENCOUNTER — OFFICE VISIT (OUTPATIENT)
Dept: FAMILY MEDICINE CLINIC | Facility: CLINIC | Age: 64
End: 2024-02-23
Payer: COMMERCIAL

## 2024-02-23 VITALS
HEIGHT: 59 IN | HEART RATE: 92 BPM | SYSTOLIC BLOOD PRESSURE: 128 MMHG | DIASTOLIC BLOOD PRESSURE: 80 MMHG | BODY MASS INDEX: 21.25 KG/M2 | TEMPERATURE: 97.6 F | WEIGHT: 105.4 LBS | RESPIRATION RATE: 18 BRPM

## 2024-02-23 DIAGNOSIS — R35.0 FREQUENT URINATION: Primary | ICD-10-CM

## 2024-02-23 DIAGNOSIS — Z98.890: ICD-10-CM

## 2024-02-23 LAB
SL AMB  POCT GLUCOSE, UA: NORMAL
SL AMB LEUKOCYTE ESTERASE,UA: NORMAL
SL AMB POCT BILIRUBIN,UA: NORMAL
SL AMB POCT BLOOD,UA: NORMAL
SL AMB POCT CLARITY,UA: CLEAR
SL AMB POCT COLOR,UA: YELLOW
SL AMB POCT KETONES,UA: NORMAL
SL AMB POCT NITRITE,UA: NORMAL
SL AMB POCT PH,UA: 6
SL AMB POCT SPECIFIC GRAVITY,UA: 1.01
SL AMB POCT URINE PROTEIN: NORMAL
SL AMB POCT UROBILINOGEN: NORMAL

## 2024-02-23 PROCEDURE — 81003 URINALYSIS AUTO W/O SCOPE: CPT | Performed by: FAMILY MEDICINE

## 2024-02-23 PROCEDURE — 99213 OFFICE O/P EST LOW 20 MIN: CPT | Performed by: FAMILY MEDICINE

## 2024-02-23 PROCEDURE — 99024 POSTOP FOLLOW-UP VISIT: CPT

## 2024-02-23 ASSESSMENT — VISUAL ACUITY
OS_CC: 20/50+1
OD_CC: 20/200

## 2024-02-23 ASSESSMENT — TONOMETRY: OD_IOP_MMHG: 12

## 2024-02-23 NOTE — PROGRESS NOTES
Chief Complaint   Patient presents with   • Possible UTI        Patient ID: Roberta Pierre is a 63 y.o. female.    HPI  Pt is seeing for frequent urination for a few days -  drinking a lot of water after glaucoma surgery  -  no associated symptoms     The following portions of the patient's history were reviewed and updated as appropriate: allergies, current medications, past family history, past medical history, past social history, past surgical history and problem list.    Review of Systems   Constitutional: Negative.    Respiratory: Negative.     Gastrointestinal:  Negative for abdominal pain.   Genitourinary:  Positive for frequency. Negative for difficulty urinating, dysuria, flank pain, hematuria and urgency.       Current Outpatient Medications   Medication Sig Dispense Refill   • acetaZOLAMIDE (DIAMOX) 250 mg tablet Take 250 mg by mouth 4 (four) times a day     • albuterol (ProAir HFA) 90 mcg/act inhaler Inhale 2 puffs every 6 (six) hours as needed for wheezing 8.5 g 5   • [place] 9 MG TABS TAKE 1 TABLET TWO TIMES A DAY 60 tablet 8   • brimonidine-timolol (COMBIGAN) 0.2-0.5 % INSTILL 1 DROP INTO RIGHT EYE TWICE A DAY     • buPROPion (WELLBUTRIN XL) 300 mg 24 hr tablet Take 1 tablet (300 mg total) by mouth every morning 90 tablet 1   • Cholecalciferol (VITAMIN D3) 2000 units CHEW Daily     • diclofenac (VOLTAREN) 75 mg EC tablet Take 1 tablet (75 mg total) by mouth 2 (two) times a day 180 tablet 1   • Docusate Calcium (STOOL SOFTENER PO) Take 100 mg by mouth daily      • escitalopram (LEXAPRO) 20 mg tablet Take 1 tablet (20 mg total) by mouth daily 90 tablet 1   • fexofenadine (ALLEGRA) 180 MG tablet Take 1 tablet (180 mg total) by mouth daily as needed (allergy) 30 tablet 6   • folic acid (FOLVITE) 1 mg tablet Take 1 mg by mouth daily      • latanoprost (XALATAN) 0.005 % ophthalmic solution INSTILL 1 DROP INTO RIGHT EYE AT BEDTIME     • levothyroxine 125 mcg tablet TAKE 1 TABLET BY MOUTH EVERY DAY 90  "tablet 1   • loteprednol etabonate (LOTEMAX) 0.5 % ophthalmic suspension      • omeprazole (PriLOSEC) 20 mg delayed release capsule Take 1 capsule (20 mg total) by mouth daily 90 capsule 0   • Polyethyl Glycol-Propyl Glycol (SYSTANE OP)      • predniSONE 5 mg tablet Take 1/2 tab po daily 45 tablet 1   • Rhopressa 0.02 % SOLN INSTILL 1 DROP INTO RIGHT EYE AT BEDTIME     • simvastatin (ZOCOR) 20 mg tablet TAKE 1 TABLET BY MOUTH EVERY DAY 30 tablet 8   • tocilizumab (Actemra) 200 mg/10 mL Infuse 8 mg/kg (402.8 mg) IV every 4 weeks;  Use in combination with 80 mg vials 10 mL 6   • tocilizumab (Actemra) 80 mg/4 mL Inject 4 mL (80 mg total) into a catheter in a vein every 28 days Infuse 8 mg/kg (402.8 mg) IV every 4 weeks; use in combination with 200 mg vial 12 mL 6   • ergocalciferol (VITAMIN D2) 50,000 units Take 1 capsule (50,000 Units total) by mouth once a week for 12 doses 12 capsule 0   • famciclovir (FAMVIR) 250 MG tablet TAKE 1 TABLET BY MOUTH EVERY DAY 30 tablet 2     No current facility-administered medications for this visit.       Objective:    /80 (BP Location: Left arm, Patient Position: Sitting, Cuff Size: Standard)   Pulse 92   Temp 97.6 °F (36.4 °C) (Temporal)   Resp 18   Ht 4' 11\" (1.499 m)   Wt 47.8 kg (105 lb 6.4 oz)   BMI 21.29 kg/m²        Physical Exam  Constitutional:       Appearance: She is not ill-appearing.   Cardiovascular:      Rate and Rhythm: Normal rate.   Pulmonary:      Effort: Pulmonary effort is normal. No respiratory distress.   Abdominal:      Palpations: Abdomen is soft.      Tenderness: There is no abdominal tenderness. There is no right CVA tenderness or left CVA tenderness.   Neurological:      Mental Status: She is alert.             Recent Results (from the past 672 hour(s))   POCT urine dip auto non-scope    Collection Time: 02/23/24  3:52 PM   Result Value Ref Range     COLOR,UA yellow     CLARITY,UA clear     SPECIFIC GRAVITY,UA 1.010      PH,UA 6     LEUKOCYTE " ESTERASE,UA neg     NITRITE,UA neg     GLUCOSE, UA norm     KETONES,UA neg     BILIRUBIN,UA neg     BLOOD,UA neg     POCT URINE PROTEIN neg     SL AMB POCT UROBILINOGEN norm         Assessment/Plan:         Diagnoses and all orders for this visit:    Frequent urination  -     Urine culture; Future  -     POCT urine dip auto non-scope          Rto prn                   Anny Palomares MD

## 2024-02-25 LAB
ALBUMIN SERPL-MCNC: 4.1 G/DL (ref 3.9–4.9)
ALBUMIN/GLOB SERPL: 1.7 {RATIO} (ref 1.2–2.2)
ALP SERPL-CCNC: 52 IU/L (ref 44–121)
ALT SERPL-CCNC: 9 IU/L (ref 0–32)
APPEARANCE UR: CLEAR
AST SERPL-CCNC: 12 IU/L (ref 0–40)
BACTERIA URNS QL MICRO: NORMAL
BASOPHILS # BLD AUTO: 0 X10E3/UL (ref 0–0.2)
BASOPHILS NFR BLD AUTO: 0 %
BILIRUB SERPL-MCNC: 0.3 MG/DL (ref 0–1.2)
BILIRUB UR QL STRIP: NEGATIVE
BUN SERPL-MCNC: 5 MG/DL (ref 8–27)
BUN/CREAT SERPL: 7 (ref 12–28)
CALCIUM SERPL-MCNC: 9.1 MG/DL (ref 8.7–10.3)
CASTS URNS QL MICRO: NORMAL /LPF
CHLORIDE SERPL-SCNC: 100 MMOL/L (ref 96–106)
CO2 SERPL-SCNC: 24 MMOL/L (ref 20–29)
COLOR UR: YELLOW
CREAT SERPL-MCNC: 0.7 MG/DL (ref 0.57–1)
CRP SERPL-MCNC: 3 MG/L (ref 0–10)
EGFR: 98 ML/MIN/1.73
EOSINOPHIL # BLD AUTO: 0.1 X10E3/UL (ref 0–0.4)
EOSINOPHIL NFR BLD AUTO: 1 %
EPI CELLS #/AREA URNS HPF: NORMAL /HPF (ref 0–10)
ERYTHROCYTE [DISTWIDTH] IN BLOOD BY AUTOMATED COUNT: 11.7 % (ref 11.7–15.4)
ERYTHROCYTE [SEDIMENTATION RATE] IN BLOOD BY WESTERGREN METHOD: 20 MM/HR (ref 0–40)
GAMMA INTERFERON BACKGROUND BLD IA-ACNC: 0.02 IU/ML
GLOBULIN SER-MCNC: 2.4 G/DL (ref 1.5–4.5)
GLUCOSE SERPL-MCNC: 96 MG/DL (ref 70–99)
GLUCOSE UR QL: NEGATIVE
HCT VFR BLD AUTO: 39.6 % (ref 34–46.6)
HGB BLD-MCNC: 13.3 G/DL (ref 11.1–15.9)
HGB UR QL STRIP: NEGATIVE
IMM GRANULOCYTES # BLD: 0 X10E3/UL (ref 0–0.1)
IMM GRANULOCYTES NFR BLD: 0 %
KETONES UR QL STRIP: NEGATIVE
LEUKOCYTE ESTERASE UR QL STRIP: NEGATIVE
LYMPHOCYTES # BLD AUTO: 0.8 X10E3/UL (ref 0.7–3.1)
LYMPHOCYTES NFR BLD AUTO: 12 %
M TB IFN-G CD4+ T-CELLS BLD-ACNC: 0.02 IU/ML
M TB IFN-G CD4+ T-CELLS BLD-ACNC: 0.02 IU/ML
MCH RBC QN AUTO: 30.7 PG (ref 26.6–33)
MCHC RBC AUTO-ENTMCNC: 33.6 G/DL (ref 31.5–35.7)
MCV RBC AUTO: 92 FL (ref 79–97)
MICRO URNS: NORMAL
MICRO URNS: NORMAL
MITOGEN IGNF BLD-ACNC: 2.91 IU/ML
MONOCYTES # BLD AUTO: 0.3 X10E3/UL (ref 0.1–0.9)
MONOCYTES NFR BLD AUTO: 5 %
NEUTROPHILS # BLD AUTO: 5.2 X10E3/UL (ref 1.4–7)
NEUTROPHILS NFR BLD AUTO: 82 %
NITRITE UR QL STRIP: NEGATIVE
PH UR STRIP: 6.5 [PH] (ref 5–7.5)
PLATELET # BLD AUTO: 251 X10E3/UL (ref 150–450)
POTASSIUM SERPL-SCNC: 4.8 MMOL/L (ref 3.5–5.2)
PROT SERPL-MCNC: 6.5 G/DL (ref 6–8.5)
PROT UR QL STRIP: NEGATIVE
QUANTIFERON INCUBATION COMMENT: NORMAL
QUANTIFERON-TB GOLD PLUS: NEGATIVE
RBC # BLD AUTO: 4.33 X10E6/UL (ref 3.77–5.28)
RBC #/AREA URNS HPF: NORMAL /HPF (ref 0–2)
SERVICE CMNT-IMP: NORMAL
SODIUM SERPL-SCNC: 137 MMOL/L (ref 134–144)
SP GR UR: 1.01 (ref 1–1.03)
UROBILINOGEN UR STRIP-ACNC: 0.2 MG/DL (ref 0.2–1)
WBC # BLD AUTO: 6.4 X10E3/UL (ref 3.4–10.8)
WBC #/AREA URNS HPF: NORMAL /HPF (ref 0–5)

## 2024-02-26 LAB
BACTERIA UR CULT: NORMAL
Lab: NORMAL

## 2024-02-27 ENCOUNTER — HOSPITAL ENCOUNTER (OUTPATIENT)
Dept: INFUSION CENTER | Facility: CLINIC | Age: 64
Discharge: HOME | End: 2024-02-27
Payer: COMMERCIAL

## 2024-02-27 VITALS
DIASTOLIC BLOOD PRESSURE: 91 MMHG | RESPIRATION RATE: 18 BRPM | HEART RATE: 84 BPM | BODY MASS INDEX: 21.91 KG/M2 | SYSTOLIC BLOOD PRESSURE: 154 MMHG | TEMPERATURE: 97.2 F | OXYGEN SATURATION: 98 % | WEIGHT: 108.5 LBS

## 2024-02-27 DIAGNOSIS — M05.79 RHEUMATOID ARTHRITIS INVOLVING MULTIPLE SITES WITH POSITIVE RHEUMATOID FACTOR (HCC): Primary | ICD-10-CM

## 2024-02-27 PROCEDURE — 96413 CHEMO IV INFUSION 1 HR: CPT

## 2024-02-27 RX ORDER — SODIUM CHLORIDE 9 MG/ML
20 INJECTION, SOLUTION INTRAVENOUS ONCE
OUTPATIENT
Start: 2024-03-26

## 2024-02-27 RX ORDER — SODIUM CHLORIDE 9 MG/ML
20 INJECTION, SOLUTION INTRAVENOUS ONCE
Status: COMPLETED | OUTPATIENT
Start: 2024-02-27 | End: 2024-02-27

## 2024-02-27 RX ADMIN — SODIUM CHLORIDE 20 ML/HR: 0.9 INJECTION, SOLUTION INTRAVENOUS at 15:19

## 2024-02-27 RX ADMIN — TOCILIZUMAB 400 MG: 20 INJECTION, SOLUTION, CONCENTRATE INTRAVENOUS at 15:21

## 2024-02-27 NOTE — PROGRESS NOTES
Patient presents today for treatment with Actemra. Patient offers no complaints. VSS. Patient denies recent illness/infection and or antibiotic use. Peripheral IV inserted without incident.

## 2024-02-27 NOTE — PROGRESS NOTES
Patient tolerated treatment without incident. Peripheral IV removed. Next appointment confirmed for 3/26/2024 at 11:30. AVS printed and given patient.

## 2024-02-29 ENCOUNTER — TELEPHONE (OUTPATIENT)
Dept: FAMILY MEDICINE CLINIC | Facility: CLINIC | Age: 64
End: 2024-02-29

## 2024-02-29 NOTE — TELEPHONE ENCOUNTER
----- Message from Anny Palomares MD sent at 2/29/2024 12:17 PM EST -----  Pl, advise pt -  urine Cx did not show UTI

## 2024-03-07 DIAGNOSIS — K21.9 GASTROESOPHAGEAL REFLUX DISEASE, UNSPECIFIED WHETHER ESOPHAGITIS PRESENT: ICD-10-CM

## 2024-03-07 RX ORDER — OMEPRAZOLE 20 MG/1
20 CAPSULE, DELAYED RELEASE ORAL DAILY
Qty: 90 CAPSULE | Refills: 0 | Status: CANCELLED | OUTPATIENT
Start: 2024-03-07

## 2024-03-07 NOTE — TELEPHONE ENCOUNTER
Reason for call:   [x] Refill   [] Prior Auth  [] Other:     Office:   [] PCP/Provider -   [x] Specialty/Provider - gastro    Medication: omeprazole 20 mg, one tab daily, 90 caps    [facility]    Does the patient have enough for 3 days?   [] Yes   [x] No - Send as HP to POD- will only have enough for a week and needs it to go to mail order

## 2024-03-08 DIAGNOSIS — E03.9 HYPOTHYROIDISM, UNSPECIFIED TYPE: ICD-10-CM

## 2024-03-08 RX ORDER — LEVOTHYROXINE SODIUM 0.12 MG/1
125 TABLET ORAL DAILY
Qty: 90 TABLET | Refills: 1 | Status: SHIPPED | OUTPATIENT
Start: 2024-03-08

## 2024-03-08 NOTE — TELEPHONE ENCOUNTER
Reason for call:   [x] Refill   [] Prior Auth  [] Other:     Office:   [x] PCP/Provider -   [] Specialty/Provider -       Medication  levothyroxine 125 mcg tablet     Dose, Route, Frequency: As Directed  Dispense Quantity: 90 tablet  Sig: TAKE 1 TABLET BY MOUTH EVERY DAY      Does the patient have enough for 3 days?   [] Yes   [x] No - Send as HP to [facility] a    Pharmacy [facility]

## 2024-03-13 ENCOUNTER — IOP CHECK (OUTPATIENT)
Dept: URBAN - METROPOLITAN AREA CLINIC 6 | Facility: CLINIC | Age: 64
End: 2024-03-13

## 2024-03-13 DIAGNOSIS — H40.1113: ICD-10-CM

## 2024-03-13 DIAGNOSIS — Z98.890: ICD-10-CM

## 2024-03-13 DIAGNOSIS — H11.31: ICD-10-CM

## 2024-03-13 PROCEDURE — 99024 POSTOP FOLLOW-UP VISIT: CPT

## 2024-03-13 ASSESSMENT — TONOMETRY
OD_IOP_MMHG: 26
OS_IOP_MMHG: 21

## 2024-03-13 ASSESSMENT — VISUAL ACUITY
OD_CC: 20/200
OS_CC: 20/30

## 2024-03-15 ENCOUNTER — TELEPHONE (OUTPATIENT)
Age: 64
End: 2024-03-15

## 2024-03-15 DIAGNOSIS — K21.9 GASTROESOPHAGEAL REFLUX DISEASE, UNSPECIFIED WHETHER ESOPHAGITIS PRESENT: Primary | ICD-10-CM

## 2024-03-15 RX ORDER — OMEPRAZOLE 20 MG/1
20 CAPSULE, DELAYED RELEASE ORAL DAILY
Qty: 90 CAPSULE | Refills: 1 | Status: SHIPPED | OUTPATIENT
Start: 2024-03-15

## 2024-03-15 NOTE — TELEPHONE ENCOUNTER
Patients GI provider:  DENNY Barrow    Number to return call: [phone]    Reason for call: Pt called stating she needed an office visit for medication refills. Pt is asking for a 30 day supply of omeprazole prior. First available appointment is for 6/11/2024.    Scheduled procedure/appointment date if applicable: 6/11/2024

## 2024-03-15 NOTE — TELEPHONE ENCOUNTER
Called and advised patient that the medication was sent to her pharmacy. She verbalized understanding. Instructed patient to call back with any further questions or concerns.

## 2024-03-26 ENCOUNTER — HOSPITAL ENCOUNTER (OUTPATIENT)
Dept: INFUSION CENTER | Facility: CLINIC | Age: 64
Discharge: HOME | End: 2024-03-26
Payer: COMMERCIAL

## 2024-03-26 VITALS
SYSTOLIC BLOOD PRESSURE: 168 MMHG | BODY MASS INDEX: 21.41 KG/M2 | OXYGEN SATURATION: 95 % | HEART RATE: 66 BPM | RESPIRATION RATE: 18 BRPM | DIASTOLIC BLOOD PRESSURE: 100 MMHG | TEMPERATURE: 97.5 F | WEIGHT: 106 LBS

## 2024-03-26 DIAGNOSIS — M05.79 RHEUMATOID ARTHRITIS INVOLVING MULTIPLE SITES WITH POSITIVE RHEUMATOID FACTOR (HCC): Primary | ICD-10-CM

## 2024-03-26 PROCEDURE — 96413 CHEMO IV INFUSION 1 HR: CPT

## 2024-03-26 RX ORDER — SODIUM CHLORIDE 9 MG/ML
20 INJECTION, SOLUTION INTRAVENOUS ONCE
OUTPATIENT
Start: 2024-04-23

## 2024-03-26 RX ORDER — SODIUM CHLORIDE 9 MG/ML
20 INJECTION, SOLUTION INTRAVENOUS ONCE
Status: COMPLETED | OUTPATIENT
Start: 2024-03-26 | End: 2024-03-26

## 2024-03-26 RX ADMIN — TOCILIZUMAB 400 MG: 20 INJECTION, SOLUTION, CONCENTRATE INTRAVENOUS at 12:13

## 2024-03-26 RX ADMIN — SODIUM CHLORIDE 20 ML/HR: 9 INJECTION, SOLUTION INTRAVENOUS at 12:00

## 2024-03-26 NOTE — PROGRESS NOTES
Pt to clinic for actemra infusion. Pt denies any recent illness or antibiotic use. Call bell in reach

## 2024-03-27 ENCOUNTER — POST-OP CHECK (OUTPATIENT)
Dept: URBAN - METROPOLITAN AREA CLINIC 6 | Facility: CLINIC | Age: 64
End: 2024-03-27

## 2024-03-27 DIAGNOSIS — Z98.890: ICD-10-CM

## 2024-03-27 PROCEDURE — 99024 POSTOP FOLLOW-UP VISIT: CPT

## 2024-03-27 ASSESSMENT — TONOMETRY: OD_IOP_MMHG: 26

## 2024-03-27 ASSESSMENT — VISUAL ACUITY
OS_CC: 20/60
OD_CC: 20/200

## 2024-04-02 ENCOUNTER — OFFICE VISIT (OUTPATIENT)
Age: 64
End: 2024-04-02
Payer: COMMERCIAL

## 2024-04-02 VITALS
WEIGHT: 108.8 LBS | TEMPERATURE: 97.9 F | BODY MASS INDEX: 20.54 KG/M2 | HEART RATE: 63 BPM | OXYGEN SATURATION: 99 % | DIASTOLIC BLOOD PRESSURE: 82 MMHG | HEIGHT: 61 IN | SYSTOLIC BLOOD PRESSURE: 132 MMHG

## 2024-04-02 DIAGNOSIS — M81.0 SENILE OSTEOPOROSIS: ICD-10-CM

## 2024-04-02 DIAGNOSIS — Z79.899 ENCOUNTER FOR LONG-TERM (CURRENT) USE OF OTHER MEDICATIONS: ICD-10-CM

## 2024-04-02 DIAGNOSIS — M47.817 LUMBOSACRAL SPONDYLOSIS WITHOUT MYELOPATHY: ICD-10-CM

## 2024-04-02 DIAGNOSIS — M05.79 RHEUMATOID ARTHRITIS INVOLVING MULTIPLE SITES WITH POSITIVE RHEUMATOID FACTOR (HCC): Primary | ICD-10-CM

## 2024-04-02 PROCEDURE — 99214 OFFICE O/P EST MOD 30 MIN: CPT | Performed by: PHYSICIAN ASSISTANT

## 2024-04-02 NOTE — ASSESSMENT & PLAN NOTE
Chronic lower back pain due to degenerative disc disease.  She gets injections as needed from pain management which do provide significant relief for her.  Her back pain is stable at this time.  Follow-up with pain management.

## 2024-04-02 NOTE — ASSESSMENT & PLAN NOTE
Her rheumatoid arthritis is generally well-controlled with Actemra infusions.  No signs of active inflammation or synovitis on exam today.  She is currently taking a low-dose of prednisone (2.5 mg daily) and has been trying to taper off of this.  If she has a flare of symptoms after tapering we could consider keeping her on a low-dose if needed.  We will continue to monitor symptoms closely.  Labs as ordered to monitor for medication side effects and toxicity.  She is up-to-date with her QuantiFERON gold testing.  Follow-up in 3 to 4 months or sooner if needed.

## 2024-04-02 NOTE — ASSESSMENT & PLAN NOTE
History of osteoporosis previously treated with Fosamax and IV Reclast.  She received dose #3 of Reclast on 1/20/2022.  She is up-to-date with her DEXA scan and will be due for an updated DEXA in November of this year.  We will continue to monitor her bone density every 2 years.

## 2024-04-02 NOTE — PROGRESS NOTES
Assessment/Plan:    Rheumatoid arthritis involving multiple sites with positive rheumatoid factor (HCC)  Her rheumatoid arthritis is generally well-controlled with Actemra infusions.  No signs of active inflammation or synovitis on exam today.  She is currently taking a low-dose of prednisone (2.5 mg daily) and has been trying to taper off of this.  If she has a flare of symptoms after tapering we could consider keeping her on a low-dose if needed.  We will continue to monitor symptoms closely.  Labs as ordered to monitor for medication side effects and toxicity.  She is up-to-date with her QuantiFERON gold testing.  Follow-up in 3 to 4 months or sooner if needed.    Lumbosacral spondylosis without myelopathy  Chronic lower back pain due to degenerative disc disease.  She gets injections as needed from pain management which do provide significant relief for her.  Her back pain is stable at this time.  Follow-up with pain management.    Senile osteoporosis  History of osteoporosis previously treated with Fosamax and IV Reclast.  She received dose #3 of Reclast on 1/20/2022.  She is up-to-date with her DEXA scan and will be due for an updated DEXA in November of this year.  We will continue to monitor her bone density every 2 years.    Glaucoma  Follow-up with ophthalmology.    Anne Marie disease (HCC)  Follow-up with neurology.       Diagnoses and all orders for this visit:    Rheumatoid arthritis involving multiple sites with positive rheumatoid factor (HCC)  -     CBC and differential; Standing  -     Comprehensive metabolic panel; Standing  -     Sedimentation rate, automated; Standing  -     C-reactive protein; Standing  -     Urinalysis with microscopic; Standing    Encounter for long-term (current) use of other medications  -     CBC and differential; Standing  -     Comprehensive metabolic panel; Standing  -     Sedimentation rate, automated; Standing  -     C-reactive protein; Standing  -     Urinalysis with  microscopic; Standing    Lumbosacral spondylosis without myelopathy    Senile osteoporosis          Subjective:      Patient ID: Roberta Pierre is a 63 y.o. female.    She is here for follow-up of her seropositive rheumatoid arthritis.  She has previously been treated with methotrexate, Arava, Remicade and is now on Actemra infusions.  She had been taking Plaquenil as well however this was recently discontinued in February (2023) due to changes seen by her eye doctor.  She is currently taking low-dose prednisone 2.5 mg as well.  Her rheumatoid arthritis is generally well-controlled with Actemra infusions.  She has minimal stiffness in the morning.  She has no swelling in her joints.    She has a history of carpal tunnel symptoms in her right hand and is following with orthopedics for this.  Carpal tunnel release has been discussed.  She has a history of lumbar degenerative disc disease and follows with pain management.  She gets injections as needed which did provide relief for her.  Her back pain is stable at this time.    She has a history of Sjogren's syndrome with chronic dry eye symptoms.  She also history of glaucoma has been following closely with her eye doctor.  She recently had an increase in intraocular pressure and had surgery on her right eye in March.  She has a history of [place]'s and has had worsening balance issues.  She follows closely with neurology.     She has a history of osteoporosis and was previously on Fosamax.  She is now on Reclast and received dose #3 on 01/20/2022.  She had a DEXA scan done on 11/17/2022.  Left total hip T score -1.9, femoral neck -1.8.  Right total hip T score -1.9, femoral neck -2.2.  There has been a slight decrease of 2.3% in her hips.  Left forearm T score -3.4.  FRAX hip fracture risk 2.4%, major fracture risk 13%.    She had labs done on 2/23/2024.  CBC, CMP essentially unremarkable.  ESR, CRP within normal limits.  QuantiFERON gold negative.        The  "following portions of the patient's history were reviewed and updated as appropriate: allergies, current medications, past family history, past medical history, past social history, past surgical history, and problem list.    Review of Systems   Constitutional:  Negative for chills, fatigue and fever.   HENT:  Negative for hearing loss, sore throat and tinnitus.         Dry mouth   Eyes:  Negative for pain and visual disturbance.        Dry eyes   Respiratory:  Negative for cough and shortness of breath.    Cardiovascular:  Negative for chest pain and palpitations.   Gastrointestinal:  Negative for abdominal pain, nausea and vomiting.   Genitourinary:  Negative for difficulty urinating.   Musculoskeletal:  Positive for arthralgias, back pain and gait problem. Negative for joint swelling, myalgias, neck pain and neck stiffness.   Skin:  Negative for rash.   Neurological:  Negative for dizziness, seizures, weakness, numbness and headaches.   Psychiatric/Behavioral:  Negative for confusion, decreased concentration and sleep disturbance.          Objective:      /82 (BP Location: Left arm, Patient Position: Sitting, Cuff Size: Adult)   Pulse 63   Temp 97.9 °F (36.6 °C) (Temporal)   Ht 5' 1\" (1.549 m)   Wt 49.4 kg (108 lb 12.8 oz)   SpO2 99%   BMI 20.56 kg/m²          Physical Exam  Constitutional:       Appearance: Normal appearance.   Cardiovascular:      Rate and Rhythm: Normal rate and regular rhythm.   Pulmonary:      Breath sounds: Normal breath sounds.   Musculoskeletal:      Comments: Full range of motion neck, bilateral shoulders, elbows.  Decreased range of motion right greater than left wrist.  Thickening bilateral wrists.  Interphalangeal OA changes bilateral hands.  No synovitis noted.  Thickening flexor tendon sheath left middle finger.  Full range of motion bilateral hips, knees, ankles.  Patellofemoral crepitus noted bilateral knees.   Skin:     General: Skin is warm and dry.   Neurological: "      General: No focal deficit present.      Mental Status: She is alert.         Dragon Dictation software was used to dictate this note. It may contain errors with dictating incorrect words/spelling. Please contact provider directly for any questions.

## 2024-04-18 ENCOUNTER — RA CDI HCC (OUTPATIENT)
Dept: OTHER | Facility: HOSPITAL | Age: 64
End: 2024-04-18

## 2024-04-18 NOTE — PROGRESS NOTES
HCC coding opportunities       Chart reviewed, no opportunity found: CHART REVIEWED, NO OPPORTUNITY FOUND        Patients Insurance        Commercial Insurance: [employer]

## 2024-04-24 ENCOUNTER — HOSPITAL ENCOUNTER (OUTPATIENT)
Dept: INFUSION CENTER | Facility: CLINIC | Age: 64
Discharge: HOME | End: 2024-04-24
Payer: COMMERCIAL

## 2024-04-24 ENCOUNTER — POST-OP CHECK (OUTPATIENT)
Dept: URBAN - METROPOLITAN AREA CLINIC 6 | Facility: CLINIC | Age: 64
End: 2024-04-24

## 2024-04-24 VITALS
HEART RATE: 20 BPM | RESPIRATION RATE: 18 BRPM | TEMPERATURE: 96.7 F | SYSTOLIC BLOOD PRESSURE: 159 MMHG | WEIGHT: 106 LBS | DIASTOLIC BLOOD PRESSURE: 99 MMHG | BODY MASS INDEX: 20.03 KG/M2

## 2024-04-24 DIAGNOSIS — M05.79 RHEUMATOID ARTHRITIS INVOLVING MULTIPLE SITES WITH POSITIVE RHEUMATOID FACTOR (HCC): Primary | ICD-10-CM

## 2024-04-24 DIAGNOSIS — Z98.890: ICD-10-CM

## 2024-04-24 PROCEDURE — 99024 POSTOP FOLLOW-UP VISIT: CPT

## 2024-04-24 PROCEDURE — 96413 CHEMO IV INFUSION 1 HR: CPT

## 2024-04-24 RX ORDER — SODIUM CHLORIDE 9 MG/ML
20 INJECTION, SOLUTION INTRAVENOUS ONCE
OUTPATIENT
Start: 2024-05-21

## 2024-04-24 RX ORDER — SODIUM CHLORIDE 9 MG/ML
20 INJECTION, SOLUTION INTRAVENOUS ONCE
Status: COMPLETED | OUTPATIENT
Start: 2024-04-24 | End: 2024-04-24

## 2024-04-24 RX ADMIN — SODIUM CHLORIDE 20 ML/HR: 0.9 INJECTION, SOLUTION INTRAVENOUS at 12:57

## 2024-04-24 RX ADMIN — TOCILIZUMAB 400 MG: 20 INJECTION, SOLUTION, CONCENTRATE INTRAVENOUS at 12:54

## 2024-04-24 ASSESSMENT — VISUAL ACUITY
OS_CC: 20/40+2
OD_PH: 20/200
OD_CC: 20/200

## 2024-04-24 ASSESSMENT — TONOMETRY
OS_IOP_MMHG: 18
OD_IOP_MMHG: 24

## 2024-04-24 NOTE — PROGRESS NOTES
Patient tolerated treatment without incident. No observation period with this dose. Peripheral IV removed. Next appointment confirmed for 5/22/2024 at 12pm, patient aware labs are due with this cycle. AVS printed and give to patient.

## 2024-04-24 NOTE — PROGRESS NOTES
Patient presents today for treatment with Actemra. Patient offers no complaints. Patient denies recent illness/infection and or antibiotic use. Per Genna Aponte Pa-C, okay to proceed without lab results, labs ordered every 12 weeks, parameters to be removed from plan. Peripheral IV inserted without incident.

## 2024-05-14 RX ORDER — PREDNISOLONE ACETATE 10 MG/ML
1 SUSPENSION/ DROPS OPHTHALMIC 4 TIMES DAILY
COMMUNITY
Start: 2024-03-09

## 2024-05-14 RX ORDER — OFLOXACIN 3 MG/ML
1 SOLUTION/ DROPS OPHTHALMIC 4 TIMES DAILY
COMMUNITY
Start: 2024-03-09

## 2024-05-15 ENCOUNTER — TELEPHONE (OUTPATIENT)
Dept: NEUROLOGY | Facility: CLINIC | Age: 64
End: 2024-05-15

## 2024-05-15 ENCOUNTER — CONSULT (OUTPATIENT)
Dept: CARDIOLOGY CLINIC | Facility: CLINIC | Age: 64
End: 2024-05-15
Payer: COMMERCIAL

## 2024-05-15 VITALS
BODY MASS INDEX: 20.58 KG/M2 | HEIGHT: 61 IN | DIASTOLIC BLOOD PRESSURE: 84 MMHG | OXYGEN SATURATION: 98 % | WEIGHT: 109 LBS | SYSTOLIC BLOOD PRESSURE: 128 MMHG | HEART RATE: 80 BPM

## 2024-05-15 DIAGNOSIS — E78.00 HYPERCHOLESTEROLEMIA: Primary | ICD-10-CM

## 2024-05-15 DIAGNOSIS — E89.0 POSTABLATIVE HYPOTHYROIDISM: ICD-10-CM

## 2024-05-15 DIAGNOSIS — R03.0 ELEVATED BP WITHOUT DIAGNOSIS OF HYPERTENSION: ICD-10-CM

## 2024-05-15 PROCEDURE — 93000 ELECTROCARDIOGRAM COMPLETE: CPT | Performed by: INTERNAL MEDICINE

## 2024-05-15 PROCEDURE — 99203 OFFICE O/P NEW LOW 30 MIN: CPT | Performed by: INTERNAL MEDICINE

## 2024-05-15 RX ORDER — AMLODIPINE BESYLATE 5 MG/1
5 TABLET ORAL DAILY
Qty: 90 TABLET | Refills: 3 | Status: SHIPPED | OUTPATIENT
Start: 2024-05-15

## 2024-05-15 RX ORDER — ATORVASTATIN CALCIUM 40 MG/1
40 TABLET, FILM COATED ORAL DAILY
Qty: 90 TABLET | Refills: 3 | Status: SHIPPED | OUTPATIENT
Start: 2024-05-15

## 2024-05-15 NOTE — PROGRESS NOTES
[facility]  [address] [place] [address] [address]   [place] [address]    Cardiology Consultation    Roberta Pierre  [id number]  1960      Consult for: Hypertension and hyperlipidemia  Appreciate consult by: Dory Emerson MD      Discussion/Summary:     1. Hypercholesterolemia  POCT ECG    amLODIPine (NORVASC) 5 mg tablet    atorvastatin (LIPITOR) 40 mg tablet    Comprehensive metabolic panel    Lipid panel      2. Elevated BP without diagnosis of hypertension  amLODIPine (NORVASC) 5 mg tablet      3. Postablative hypothyroidism           Patient with elevated cholesterol levels with LDL greater than 190 mg/dL.  She is using only simvastatin 20 mg daily.  Will discontinue simvastatin and switch to more potent statin.  Recommend repeat lipid panel in 6 weeks.  She has elevated blood pressure readings over her past few visits.  She also had a recheck today which showed a blood pressure of 156/86.  Will begin amlodipine 5 mg daily.  -She will follow-up with Dr. Emerson and return to office as needed.          HPI:     Roberta Pierre is a 63 y.o. here for evaluation of hypertension and hyperlipidemia.  The patient has a longstanding history of hyperlipidemia.  She uses simvastatin 20 mg daily for the past few years.  Cholesterol levels have fluctuated but her last check in October 2023 showed an LDL of 192 mg/dL, total cholesterol 276 and HDL of 61.  She is also had elevated blood pressure readings over the past several months.  She denies any shortness of breath or chest pain.  She denies any lower extremity edema, orthopnea or paroxysmal nocturnal dyspnea.  No previous cardiac history.  Denies any family history of CAD but multiple family members have elevated cholesterol.      Past Medical History:   Diagnosis Date    Allergic rhinitis     Anxiety     Colitis     Colon polyps     + history for colon polyps    Depression     Disease of thyroid gland     Full dentures     GERD  (gastroesophageal reflux disease)     Glaucoma     Glaucoma     Herpes gingivostomatitis     Herpes simplex infection     [place]'s disease (HCC)     Hyperlipidemia     Hypertension     new onset 05/2019    Multiple benign polyps of large intestine     Osteoarthritis     Osteopenia     Osteoporosis     RA (rheumatoid arthritis) (HCC)     Sjogren's disease (HCC)      Social History     Tobacco Use    Smoking status: Never    Smokeless tobacco: Never   Vaping Use    Vaping status: Never Used   Substance Use Topics    Alcohol use: Not Currently     Comment: SOCIAL     Drug use: Never      Family History   Problem Relation Age of Onset    K[place]itat's disease Father     Alcohol abuse Father     Coronary artery disease Family     Hypertension Mother     Diabetes Mother     Meniere's disease Brother     Lupus Daughter     Immunodeficiency Son     Diabetes Paternal Grandmother     Heart disease Paternal Grandmother     No Known Problems Sister     No Known Problems Sister     No Known Problems Sister     Meniere's disease Brother     No Known Problems Maternal Aunt     No Known Problems Maternal Aunt      Past Surgical History:   Procedure Laterality Date    COLONOSCOPY      DENTAL SURGERY      upper and lower extractions    ESOPHAGOGASTRODUODENOSCOPY      DIAGNOSTIC    GLAUCOMA SURGERY Right 02/15/2024    INCONTINENCE SURGERY      LAPAROSCOPIC SLING OPERATION FOR STRESS INCONTINENCE     UPPER GASTROINTESTINAL ENDOSCOPY         Current Outpatient Medications:     acetaZOLAMIDE (DIAMOX) 250 mg tablet, Take 250 mg by mouth 4 (four) times a day, Disp: , Rfl:     albuterol (ProAir HFA) 90 mcg/act inhaler, Inhale 2 puffs every 6 (six) hours as needed for wheezing, Disp: 8.5 g, Rfl: 5    amLODIPine (NORVASC) 5 mg tablet, Take 1 tablet (5 mg total) by mouth daily, Disp: 90 tablet, Rfl: 3    atorvastatin (LIPITOR) 40 mg tablet, Take 1 tablet (40 mg total) by mouth daily, Disp: 90 tablet, Rfl: 3    Austedo 9 MG TABS, TAKE 1  TABLET TWO TIMES A DAY, Disp: 60 tablet, Rfl: 8    brimonidine-timolol (COMBIGAN) 0.2-0.5 %, INSTILL 1 DROP INTO RIGHT EYE TWICE A DAY, Disp: , Rfl:     buPROPion (WELLBUTRIN XL) 300 mg 24 hr tablet, Take 1 tablet (300 mg total) by mouth every morning, Disp: 90 tablet, Rfl: 1    Cholecalciferol (VITAMIN D3) 2000 units CHEW, Daily, Disp: , Rfl:     diclofenac (VOLTAREN) 75 mg EC tablet, Take 1 tablet (75 mg total) by mouth 2 (two) times a day, Disp: 180 tablet, Rfl: 1    Docusate Calcium (STOOL SOFTENER PO), Take 100 mg by mouth daily , Disp: , Rfl:     escitalopram (LEXAPRO) 20 mg tablet, Take 1 tablet (20 mg total) by mouth daily, Disp: 90 tablet, Rfl: 1    fexofenadine (ALLEGRA) 180 MG tablet, Take 1 tablet (180 mg total) by mouth daily as needed (allergy), Disp: 30 tablet, Rfl: 6    folic acid (FOLVITE) 1 mg tablet, Take 1 mg by mouth daily , Disp: , Rfl:     latanoprost (XALATAN) 0.005 % ophthalmic solution, INSTILL 1 DROP INTO RIGHT EYE AT BEDTIME, Disp: , Rfl:     levothyroxine 125 mcg tablet, Take 1 tablet (125 mcg total) by mouth daily, Disp: 90 tablet, Rfl: 1    loteprednol etabonate (LOTEMAX) 0.5 % ophthalmic suspension, , Disp: , Rfl:     ofloxacin (OCUFLOX) 0.3 % ophthalmic solution, Administer 1 drop to the right eye 4 (four) times a day, Disp: , Rfl:     omeprazole (PriLOSEC) 20 mg delayed release capsule, Take 1 capsule (20 mg total) by mouth daily, Disp: 90 capsule, Rfl: 0    omeprazole (PriLOSEC) 20 mg delayed release capsule, Take 1 capsule (20 mg total) by mouth daily, Disp: 90 capsule, Rfl: 1    Polyethyl Glycol-Propyl Glycol (SYSTANE OP), , Disp: , Rfl:     prednisoLONE acetate (PRED FORTE) 1 % ophthalmic suspension, Administer 1 drop to the right eye 4 (four) times a day, Disp: , Rfl:     Rhopressa 0.02 % SOLN, INSTILL 1 DROP INTO RIGHT EYE AT BEDTIME, Disp: , Rfl:     tocilizumab (Actemra) 200 mg/10 mL, Infuse 8 mg/kg (402.8 mg) IV every 4 weeks;  Use in combination with 80 mg vials, Disp: 10  "mL, Rfl: 6    tocilizumab (Actemra) 80 mg/4 mL, Inject 4 mL (80 mg total) into a catheter in a vein every 28 days Infuse 8 mg/kg (402.8 mg) IV every 4 weeks; use in combination with 200 mg vial, Disp: 12 mL, Rfl: 6    ergocalciferol (VITAMIN D2) 50,000 units, Take 1 capsule (50,000 Units total) by mouth once a week for 12 doses, Disp: 12 capsule, Rfl: 0    famciclovir (FAMVIR) 250 MG tablet, TAKE 1 TABLET BY MOUTH EVERY DAY, Disp: 30 tablet, Rfl: 2    predniSONE 5 mg tablet, Take 1/2 tab po daily (Patient not taking: Reported on 4/2/2024), Disp: 45 tablet, Rfl: 1  Allergies   Allergen Reactions    Gold Itching    Sulfa Antibiotics Hives and Rash    Valacyclovir Palpitations       Review of Systems:   Review of Systems   Respiratory:  Negative for shortness of breath.    Cardiovascular:  Negative for chest pain, palpitations and leg swelling.   Musculoskeletal:  Positive for arthralgias and myalgias.   All other systems reviewed and are negative.      Physical Examination:     Vitals:    05/15/24 1453   BP: 128/84   BP Location: Right arm   Patient Position: Sitting   Cuff Size: Standard   Pulse: 80   SpO2: 98%   Weight: 49.4 kg (109 lb)   Height: 5' 1\" (1.549 m)       Physical Exam   Constitutional: She appears healthy. No distress.   Eyes: Pupils are equal, round, and reactive to light. Conjunctivae are normal.   Neck: No JVD present.   Cardiovascular: Normal rate, regular rhythm and normal heart sounds. Exam reveals no gallop and no friction rub.   No murmur heard.  Pulmonary/Chest: Effort normal and breath sounds normal. She has no wheezes. She has no rales.   Musculoskeletal:         General: No tenderness, deformity or edema.      Cervical back: Normal range of motion and neck supple.   Neurological: She is alert and oriented to person, place, and time.   Skin: Skin is warm and dry.        Labs:     Lab Results   Component Value Date    WBC 6.4 02/23/2024    HGB 13.3 02/23/2024    HCT 39.6 02/23/2024    MCV 92 " "02/23/2024    RDW 11.7 02/23/2024     02/23/2024     BMP:  Lab Results   Component Value Date    SODIUM 137 02/23/2024    K 4.8 02/23/2024     02/23/2024    CO2 24 02/23/2024    BUN 5 (L) 02/23/2024    CREATININE 0.70 02/23/2024    GLUC 96 02/23/2024    CALCIUM 8.8 11/05/2023    EGFR 98 02/23/2024    MG 2.1 01/22/2021     LFT:  Lab Results   Component Value Date    AST 12 02/23/2024    ALT 9 02/23/2024    ALKPHOS 37 11/05/2023    TP 6.5 02/23/2024    ALB 4.1 02/23/2024      Lab Results   Component Value Date    PBG5XGOWSUOD 0.701 09/29/2016     No results found for: \"HGBA1C\"  Lipid Profile:   Lab Results   Component Value Date    CHOLESTEROL 276 (H) 10/20/2023    HDL 61 10/20/2023    LDLCALC 192 (H) 10/20/2023    TRIG 129 10/20/2023     Lab Results   Component Value Date    CHOLESTEROL 276 (H) 10/20/2023    CHOLESTEROL 225 (H) 06/10/2021     No results found for: \"CKTOTAL\", \"CKMB\", \"CKMBINDEX\", \"TROPONINI\"  No results found for: \"NTBNP\"   No results found for this or any previous visit (from the past 672 hour(s)).    Imaging & Testing   I have personally reviewed pertinent reports.      Cardiac Testing   No results found for this or any previous visit.    EKG: Personally reviewed.    Normal Sinsu rhythm with rate 80 and nonspecific ST abnormalities      Luis Amaya DO, [facility]  [phone]  Please call with any questions.  "

## 2024-05-15 NOTE — TELEPHONE ENCOUNTER
[employer] called regarding prior authorization for [employer] and has key [id number].  Please assist.

## 2024-05-16 ENCOUNTER — TELEPHONE (OUTPATIENT)
Age: 64
End: 2024-05-16

## 2024-05-16 DIAGNOSIS — M05.79 RHEUMATOID ARTHRITIS INVOLVING MULTIPLE SITES WITH POSITIVE RHEUMATOID FACTOR (HCC): ICD-10-CM

## 2024-05-16 RX ORDER — PREDNISONE 5 MG/1
TABLET ORAL
Qty: 45 TABLET | Refills: 1 | Status: SHIPPED | OUTPATIENT
Start: 2024-05-16

## 2024-05-16 NOTE — TELEPHONE ENCOUNTER
Patient is calling because saleem told the patient that she would refill the patient's prednisone please send to [facility] pharm in [facility] and advise patient when done please thank you

## 2024-05-17 NOTE — TELEPHONE ENCOUNTER
Called [facility] and made the pharmacist aware of the approval. Message left on pt's voice mail making her aware.

## 2024-05-20 DIAGNOSIS — B00.9 HERPES: ICD-10-CM

## 2024-05-21 RX ORDER — FAMCICLOVIR 250 MG/1
TABLET ORAL
Qty: 30 TABLET | Refills: 5 | Status: SHIPPED | OUTPATIENT
Start: 2024-05-21 | End: 2024-06-20

## 2024-05-22 ENCOUNTER — HOSPITAL ENCOUNTER (OUTPATIENT)
Dept: INFUSION CENTER | Facility: CLINIC | Age: 64
Discharge: HOME | End: 2024-05-22
Payer: COMMERCIAL

## 2024-05-22 VITALS
HEART RATE: 68 BPM | OXYGEN SATURATION: 98 % | TEMPERATURE: 97.4 F | SYSTOLIC BLOOD PRESSURE: 126 MMHG | DIASTOLIC BLOOD PRESSURE: 85 MMHG | WEIGHT: 105.5 LBS | RESPIRATION RATE: 18 BRPM | BODY MASS INDEX: 19.93 KG/M2

## 2024-05-22 DIAGNOSIS — E03.9 HYPOTHYROIDISM, UNSPECIFIED TYPE: Primary | ICD-10-CM

## 2024-05-22 DIAGNOSIS — M05.79 RHEUMATOID ARTHRITIS INVOLVING MULTIPLE SITES WITH POSITIVE RHEUMATOID FACTOR (HCC): Primary | ICD-10-CM

## 2024-05-22 LAB
ALBUMIN SERPL-MCNC: 4.1 G/DL (ref 3.9–4.9)
ALBUMIN SERPL-MCNC: 4.1 G/DL (ref 3.9–4.9)
ALBUMIN/GLOB SERPL: 1.7 {RATIO} (ref 1.2–2.2)
ALBUMIN/GLOB SERPL: 1.7 {RATIO} (ref 1.2–2.2)
ALP SERPL-CCNC: 43 IU/L (ref 44–121)
ALP SERPL-CCNC: 45 IU/L (ref 44–121)
ALT SERPL-CCNC: 10 IU/L (ref 0–32)
ALT SERPL-CCNC: 8 IU/L (ref 0–32)
APPEARANCE UR: CLEAR
AST SERPL-CCNC: 14 IU/L (ref 0–40)
AST SERPL-CCNC: 16 IU/L (ref 0–40)
BACTERIA URNS QL MICRO: NORMAL
BASOPHILS # BLD AUTO: 0 X10E3/UL (ref 0–0.2)
BASOPHILS NFR BLD AUTO: 0 %
BILIRUB SERPL-MCNC: 0.3 MG/DL (ref 0–1.2)
BILIRUB SERPL-MCNC: 0.4 MG/DL (ref 0–1.2)
BILIRUB UR QL STRIP: NEGATIVE
BUN SERPL-MCNC: 10 MG/DL (ref 8–27)
BUN SERPL-MCNC: 9 MG/DL (ref 8–27)
BUN/CREAT SERPL: 12 (ref 12–28)
BUN/CREAT SERPL: 15 (ref 12–28)
CALCIUM SERPL-MCNC: 9.2 MG/DL (ref 8.7–10.3)
CALCIUM SERPL-MCNC: 9.3 MG/DL (ref 8.7–10.3)
CASTS URNS QL MICRO: NORMAL /LPF
CHLORIDE SERPL-SCNC: 101 MMOL/L (ref 96–106)
CHLORIDE SERPL-SCNC: 101 MMOL/L (ref 96–106)
CO2 SERPL-SCNC: 21 MMOL/L (ref 20–29)
CO2 SERPL-SCNC: 21 MMOL/L (ref 20–29)
COLOR UR: YELLOW
CREAT SERPL-MCNC: 0.67 MG/DL (ref 0.57–1)
CREAT SERPL-MCNC: 0.73 MG/DL (ref 0.57–1)
CRP SERPL-MCNC: <1 MG/L (ref 0–10)
EGFR: 93 ML/MIN/1.73
EGFR: 99 ML/MIN/1.73
EOSINOPHIL # BLD AUTO: 0.5 X10E3/UL (ref 0–0.4)
EOSINOPHIL NFR BLD AUTO: 9 %
EPI CELLS #/AREA URNS HPF: NORMAL /HPF (ref 0–10)
ERYTHROCYTE [DISTWIDTH] IN BLOOD BY AUTOMATED COUNT: 12.6 % (ref 11.7–15.4)
ERYTHROCYTE [SEDIMENTATION RATE] IN BLOOD BY WESTERGREN METHOD: 4 MM/HR (ref 0–40)
GLOBULIN SER-MCNC: 2.4 G/DL (ref 1.5–4.5)
GLOBULIN SER-MCNC: 2.4 G/DL (ref 1.5–4.5)
GLUCOSE SERPL-MCNC: 85 MG/DL (ref 70–99)
GLUCOSE SERPL-MCNC: 87 MG/DL (ref 70–99)
GLUCOSE UR QL: NEGATIVE
HCT VFR BLD AUTO: 42.3 % (ref 34–46.6)
HGB BLD-MCNC: 14.3 G/DL (ref 11.1–15.9)
HGB UR QL STRIP: NEGATIVE
IMM GRANULOCYTES # BLD: 0 X10E3/UL (ref 0–0.1)
IMM GRANULOCYTES NFR BLD: 0 %
KETONES UR QL STRIP: NEGATIVE
LEUKOCYTE ESTERASE UR QL STRIP: NEGATIVE
LYMPHOCYTES # BLD AUTO: 1.1 X10E3/UL (ref 0.7–3.1)
LYMPHOCYTES NFR BLD AUTO: 19 %
MCH RBC QN AUTO: 30.5 PG (ref 26.6–33)
MCHC RBC AUTO-ENTMCNC: 33.8 G/DL (ref 31.5–35.7)
MCV RBC AUTO: 90 FL (ref 79–97)
MICRO URNS: NORMAL
MICRO URNS: NORMAL
MONOCYTES # BLD AUTO: 0.5 X10E3/UL (ref 0.1–0.9)
MONOCYTES NFR BLD AUTO: 8 %
NEUTROPHILS # BLD AUTO: 3.8 X10E3/UL (ref 1.4–7)
NEUTROPHILS NFR BLD AUTO: 64 %
NITRITE UR QL STRIP: NEGATIVE
PH UR STRIP: 6.5 [PH] (ref 5–7.5)
PLATELET # BLD AUTO: 235 X10E3/UL (ref 150–450)
POTASSIUM SERPL-SCNC: 4.1 MMOL/L (ref 3.5–5.2)
POTASSIUM SERPL-SCNC: 4.2 MMOL/L (ref 3.5–5.2)
PROT SERPL-MCNC: 6.5 G/DL (ref 6–8.5)
PROT SERPL-MCNC: 6.5 G/DL (ref 6–8.5)
PROT UR QL STRIP: NEGATIVE
RBC # BLD AUTO: 4.69 X10E6/UL (ref 3.77–5.28)
RBC #/AREA URNS HPF: NORMAL /HPF (ref 0–2)
SODIUM SERPL-SCNC: 136 MMOL/L (ref 134–144)
SODIUM SERPL-SCNC: 137 MMOL/L (ref 134–144)
SP GR UR: 1.01 (ref 1–1.03)
TSH SERPL DL<=0.005 MIU/L-ACNC: 0.04 UIU/ML (ref 0.45–4.5)
UROBILINOGEN UR STRIP-ACNC: 0.2 MG/DL (ref 0.2–1)
WBC # BLD AUTO: 5.9 X10E3/UL (ref 3.4–10.8)
WBC #/AREA URNS HPF: NORMAL /HPF (ref 0–5)

## 2024-05-22 PROCEDURE — 96413 CHEMO IV INFUSION 1 HR: CPT

## 2024-05-22 RX ORDER — SODIUM CHLORIDE 9 MG/ML
20 INJECTION, SOLUTION INTRAVENOUS ONCE
Status: COMPLETED | OUTPATIENT
Start: 2024-05-22 | End: 2024-05-22

## 2024-05-22 RX ORDER — SODIUM CHLORIDE 9 MG/ML
20 INJECTION, SOLUTION INTRAVENOUS ONCE
OUTPATIENT
Start: 2024-06-19

## 2024-05-22 RX ORDER — LEVOTHYROXINE SODIUM 112 UG/1
112 TABLET ORAL
Qty: 100 TABLET | Refills: 3 | Status: SHIPPED | OUTPATIENT
Start: 2024-05-22 | End: 2024-05-30 | Stop reason: SDUPTHER

## 2024-05-22 RX ADMIN — TOCILIZUMAB 400 MG: 20 INJECTION, SOLUTION, CONCENTRATE INTRAVENOUS at 13:07

## 2024-05-22 RX ADMIN — SODIUM CHLORIDE 20 ML/HR: 0.9 INJECTION, SOLUTION INTRAVENOUS at 12:40

## 2024-05-22 NOTE — PROGRESS NOTES
Patient arrives to infusion center for Actemra today. Patient denies any S/S/ infection/illness/recent antibiotic use. PIV placed without issue, patient tolerated well. Patient resting on recliner chair, call bell within reach.

## 2024-05-22 NOTE — PROGRESS NOTES
Patient tolerated Actemra today without issue. PIV removed intact, coban wrap in place. Patient aware to schedule next appt upon DC with  staff.

## 2024-05-23 ENCOUNTER — TELEPHONE (OUTPATIENT)
Dept: FAMILY MEDICINE CLINIC | Facility: CLINIC | Age: 64
End: 2024-05-23

## 2024-05-23 NOTE — TELEPHONE ENCOUNTER
----- Message from Dory Aden MD sent at 5/22/2024  9:32 PM EDT -----  Please advise the patient that I know we see eachother next week. But her Thyroid level is over treated we will need to reduce her Thyroid medication to 112mcg. PLEASE tell her NOT to throw our Levothyroxine 125mcg, given that we might need to alternate in the future to get the perfect dose. All other labs are in range but will discuss at our next appointment

## 2024-05-30 ENCOUNTER — OFFICE VISIT (OUTPATIENT)
Dept: FAMILY MEDICINE CLINIC | Facility: CLINIC | Age: 64
End: 2024-05-30
Payer: COMMERCIAL

## 2024-05-30 VITALS
SYSTOLIC BLOOD PRESSURE: 112 MMHG | HEIGHT: 61 IN | DIASTOLIC BLOOD PRESSURE: 68 MMHG | TEMPERATURE: 97.3 F | WEIGHT: 104.2 LBS | OXYGEN SATURATION: 97 % | RESPIRATION RATE: 18 BRPM | BODY MASS INDEX: 19.67 KG/M2 | HEART RATE: 86 BPM

## 2024-05-30 DIAGNOSIS — G10 HUNTINGTON DISEASE (HCC): ICD-10-CM

## 2024-05-30 DIAGNOSIS — F32.A DEPRESSION, UNSPECIFIED DEPRESSION TYPE: ICD-10-CM

## 2024-05-30 DIAGNOSIS — E03.9 HYPOTHYROIDISM, UNSPECIFIED TYPE: Primary | ICD-10-CM

## 2024-05-30 DIAGNOSIS — H40.9 GLAUCOMA OF BOTH EYES, UNSPECIFIED GLAUCOMA TYPE: ICD-10-CM

## 2024-05-30 DIAGNOSIS — E78.00 HYPERCHOLESTEROLEMIA: ICD-10-CM

## 2024-05-30 PROCEDURE — 99214 OFFICE O/P EST MOD 30 MIN: CPT | Performed by: FAMILY MEDICINE

## 2024-05-30 RX ORDER — LEVOTHYROXINE SODIUM 112 UG/1
112 TABLET ORAL
Qty: 100 TABLET | Refills: 3 | Status: SHIPPED | OUTPATIENT
Start: 2024-05-30

## 2024-05-30 NOTE — PROGRESS NOTES
Roberta Pierre 1960 female MRN: [id number]    [facility] OFFICE VISIT  [facility]      ASSESSMENT/PLAN  Roberta Pierre is a 63 y.o. female presents to the office for    Diagnoses and all orders for this visit:    Hypothyroidism, unspecified type  -     levothyroxine 112 mcg tablet; Take 1 tablet (112 mcg total) by mouth daily in the early morning  -     TSH, 3rd generation; Future  -     T4, free; Future    Hypercholesterolemia    Glaucoma of both eyes, unspecified glaucoma type    [place] disease (HCC)    Depression, unspecified depression type       Hypothyroidism  Thyroid levels very well  Levothyroxine 112 mcg daily .  repeat TSH and T4 6 to 8 weeks after being on the medication  Hypercholesteremia continue on atorvastatin 40 mg daily  Glaucoma being managed by her specialist.  [place] disease currently stable at the patient's baseline    Depression currently stable on Wellbutrin and Lexapro             Future Appointments   Date Time Provider Department Center   6/11/2024  9:30 AM Kelley Barrow PA-C [facility] Practice-Med   6/17/2024  3:00 PM AN INF BED 1 AN Infusion AN HOSP CC   7/18/2024 11:30 AM Genna Aponte PA-C Rhe[facility] RHEUM   10/28/2024 11:15 AM Alecia Rob MD Rhe[facility] RHEUM   11/1/2024  2:00 PM Dory Aden MD [facility]          SUBJECTIVE  CC: Follow-up (Chronic conditions)      HPI:  Roberta Pierre is a 63 y.o. female who presents for chronic follow-up.  Patient with a history of [place] disease currently stable.  Patient has been undergoing treatment with glaucoma states that is why her eyes are little red and irritated today.  Patient is also here to review her labs.  She is taking her thyroid cholesterol medication as prescribed.  And all her medications on her list.  Review of Systems   Constitutional:  Negative for activity change, appetite change, chills, fatigue and fever.   HENT:  Negative  for congestion.    Eyes:  Positive for discharge and itching.   Respiratory:  Negative for cough, chest tightness and shortness of breath.    Cardiovascular:  Negative for chest pain and leg swelling.   Gastrointestinal:  Negative for abdominal distention, abdominal pain, constipation, diarrhea, nausea and vomiting.   All other systems reviewed and are negative.      Historical Information   The patient history was reviewed as follows:  Past Medical History:   Diagnosis Date   • Allergic rhinitis    • Anxiety    • Colitis    • Colon polyps     + history for colon polyps   • Depression    • Disease of thyroid gland    • Full dentures    • GERD (gastroesophageal reflux disease)    • Glaucoma    • Glaucoma    • Herpes gingivostomatitis    • Herpes simplex infection    • [place]'s disease ([facility])    • Hyperlipidemia    • Hypertension     new onset 05/2019   • Multiple benign polyps of large intestine    • Osteoarthritis    • Osteopenia    • Osteoporosis    • RA (rheumatoid arthritis) ([facility])    • Sjogren's disease ([facility])          Medications:     Current Outpatient Medications:   •  acetaZOLAMIDE (DIAMOX) 250 mg tablet, Take 250 mg by mouth 4 (four) times a day, Disp: , Rfl:   •  albuterol (ProAir HFA) 90 mcg/act inhaler, Inhale 2 puffs every 6 (six) hours as needed for wheezing, Disp: 8.5 g, Rfl: 5  •  amLODIPine (NORVASC) 5 mg tablet, Take 1 tablet (5 mg total) by mouth daily, Disp: 90 tablet, Rfl: 3  •  atorvastatin (LIPITOR) 40 mg tablet, Take 1 tablet (40 mg total) by mouth daily, Disp: 90 tablet, Rfl: 3  •  Austedo 9 MG TABS, TAKE 1 TABLET TWO TIMES A DAY, Disp: 60 tablet, Rfl: 8  •  brimonidine-timolol (COMBIGAN) 0.2-0.5 %, INSTILL 1 DROP INTO RIGHT EYE TWICE A DAY, Disp: , Rfl:   •  buPROPion (WELLBUTRIN XL) 300 mg 24 hr tablet, Take 1 tablet (300 mg total) by mouth every morning, Disp: 90 tablet, Rfl: 1  •  Cholecalciferol (VITAMIN D3) 2000 units CHEW, Daily, Disp: , Rfl:   •  diclofenac (VOLTAREN) 75 mg EC  tablet, Take 1 tablet (75 mg total) by mouth 2 (two) times a day, Disp: 180 tablet, Rfl: 1  •  Docusate Calcium (STOOL SOFTENER PO), Take 100 mg by mouth daily , Disp: , Rfl:   •  escitalopram (LEXAPRO) 20 mg tablet, Take 1 tablet (20 mg total) by mouth daily, Disp: 90 tablet, Rfl: 1  •  famciclovir (FAMVIR) 250 MG tablet, TAKE 1 TABLET BY MOUTH EVERY DAY, Disp: 30 tablet, Rfl: 5  •  fexofenadine (ALLEGRA) 180 MG tablet, Take 1 tablet (180 mg total) by mouth daily as needed (allergy), Disp: 30 tablet, Rfl: 6  •  folic acid (FOLVITE) 1 mg tablet, Take 1 mg by mouth daily , Disp: , Rfl:   •  latanoprost (XALATAN) 0.005 % ophthalmic solution, INSTILL 1 DROP INTO RIGHT EYE AT BEDTIME, Disp: , Rfl:   •  levothyroxine 112 mcg tablet, Take 1 tablet (112 mcg total) by mouth daily in the early morning, Disp: 100 tablet, Rfl: 3  •  levothyroxine 125 mcg tablet, Take 1 tablet (125 mcg total) by mouth daily, Disp: 90 tablet, Rfl: 1  •  loteprednol etabonate (LOTEMAX) 0.5 % ophthalmic suspension, , Disp: , Rfl:   •  ofloxacin (OCUFLOX) 0.3 % ophthalmic solution, Administer 1 drop to the right eye 4 (four) times a day, Disp: , Rfl:   •  omeprazole (PriLOSEC) 20 mg delayed release capsule, Take 1 capsule (20 mg total) by mouth daily, Disp: 90 capsule, Rfl: 0  •  omeprazole (PriLOSEC) 20 mg delayed release capsule, Take 1 capsule (20 mg total) by mouth daily, Disp: 90 capsule, Rfl: 1  •  Polyethyl Glycol-Propyl Glycol (SYSTANE OP), , Disp: , Rfl:   •  prednisoLONE acetate (PRED FORTE) 1 % ophthalmic suspension, Administer 1 drop to the right eye 4 (four) times a day, Disp: , Rfl:   •  predniSONE 5 mg tablet, Take 1/2 tab po daily, Disp: 45 tablet, Rfl: 1  •  Rhopressa 0.02 % SOLN, INSTILL 1 DROP INTO RIGHT EYE AT BEDTIME, Disp: , Rfl:   •  tocilizumab (Actemra) 200 mg/10 mL, Infuse 8 mg/kg (402.8 mg) IV every 4 weeks;  Use in combination with 80 mg vials, Disp: 10 mL, Rfl: 6  •  tocilizumab (Actemra) 80 mg/4 mL, Inject 4 mL (80 mg  "total) into a catheter in a vein every 28 days Infuse 8 mg/kg (402.8 mg) IV every 4 weeks; use in combination with 200 mg vial, Disp: 12 mL, Rfl: 6  •  ergocalciferol (VITAMIN D2) 50,000 units, Take 1 capsule (50,000 Units total) by mouth once a week for 12 doses, Disp: 12 capsule, Rfl: 0    Allergies   Allergen Reactions   • Gold Itching   • Sulfa Antibiotics Hives and Rash   • Valacyclovir Palpitations       OBJECTIVE  Vitals:   Vitals:    05/30/24 1601   BP: 112/68   BP Location: Left arm   Patient Position: Sitting   Cuff Size: Standard   Pulse: 86   Resp: 18   Temp: (!) 97.3 °F (36.3 °C)   TempSrc: Temporal   SpO2: 97%   Weight: 47.3 kg (104 lb 3.2 oz)   Height: 5' 1\" (1.549 m)         Physical Exam  Vitals reviewed.   Constitutional:       Appearance: She is well-developed.   HENT:      Head: Normocephalic and atraumatic.   Eyes:      General:         Right eye: Discharge present.         Left eye: Discharge present.     Extraocular Movements: EOM normal.      Conjunctiva/sclera: Conjunctivae normal.      Pupils: Pupils are equal, round, and reactive to light.   Cardiovascular:      Rate and Rhythm: Normal rate and regular rhythm.      Heart sounds: Normal heart sounds, S1 normal and S2 normal. No murmur heard.  Pulmonary:      Effort: Pulmonary effort is normal. No respiratory distress.      Breath sounds: Normal breath sounds. No wheezing.   Musculoskeletal:         General: No edema. Normal range of motion.      Cervical back: Normal range of motion and neck supple.   Skin:     General: Skin is warm.   Neurological:      Mental Status: She is alert and oriented to person, place, and time.   Psychiatric:         Mood and Affect: Mood and affect normal.         Speech: Speech normal.         Behavior: Behavior normal.         Thought Content: Thought content normal.         Judgment: Judgment normal.                    Dory Emerson MD,   [facility]  6/3/2024      "

## 2024-06-04 ENCOUNTER — IOP CHECK (OUTPATIENT)
Dept: URBAN - METROPOLITAN AREA CLINIC 6 | Facility: CLINIC | Age: 64
End: 2024-06-04

## 2024-06-04 DIAGNOSIS — E05.00: ICD-10-CM

## 2024-06-04 DIAGNOSIS — H40.1113: ICD-10-CM

## 2024-06-04 DIAGNOSIS — H25.13: ICD-10-CM

## 2024-06-04 DIAGNOSIS — Z98.890: ICD-10-CM

## 2024-06-04 DIAGNOSIS — H04.121: ICD-10-CM

## 2024-06-04 DIAGNOSIS — H40.1122: ICD-10-CM

## 2024-06-04 PROBLEM — Z12.31 ENCOUNTER FOR SCREENING MAMMOGRAM FOR MALIGNANT NEOPLASM OF BREAST: Status: RESOLVED | Noted: 2021-06-13 | Resolved: 2024-06-04

## 2024-06-04 PROBLEM — K59.00 CONSTIPATION: Status: RESOLVED | Noted: 2020-12-09 | Resolved: 2024-06-04

## 2024-06-04 PROCEDURE — 99214 OFFICE O/P EST MOD 30 MIN: CPT

## 2024-06-04 ASSESSMENT — TONOMETRY
OS_IOP_MMHG: 15
OD_IOP_MMHG: 28

## 2024-06-04 ASSESSMENT — VISUAL ACUITY
OS_CC: 20/40
OD_PH: 20/200
OD_CC: 20/200

## 2024-06-11 ENCOUNTER — TELEPHONE (OUTPATIENT)
Dept: GASTROENTEROLOGY | Facility: CLINIC | Age: 64
End: 2024-06-11

## 2024-06-11 NOTE — TELEPHONE ENCOUNTER
Pt's appt converted to Virtual appt a 1/2 hour prior to appt. Provider not notified of change, pt's last My Chart log in: Last User Login    2/3/2024

## 2024-06-11 NOTE — TELEPHONE ENCOUNTER
"Patients GI provider:  Jeancarlos Quinteros    Number to return call: [phone]    Reason for call: A message was sent via teams asking if pt was able to received a phone call due to patient not being able to get into [facility] [facility].    Per office,\"Virtual's cannot be via phone call only My Chart.\" Patient was rescheduled for 10/16/2024.     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 Can patient proceed with virtual? Please reach out to patient at the number above, thank you.    Scheduled procedure/appointment date if applicable: Apt/procedure 10/16/2024      "

## 2024-06-15 DIAGNOSIS — K21.9 GASTROESOPHAGEAL REFLUX DISEASE, UNSPECIFIED WHETHER ESOPHAGITIS PRESENT: ICD-10-CM

## 2024-06-16 RX ORDER — OMEPRAZOLE 20 MG/1
20 CAPSULE, DELAYED RELEASE ORAL DAILY
Qty: 90 CAPSULE | Refills: 1 | Status: SHIPPED | OUTPATIENT
Start: 2024-06-16

## 2024-06-17 ENCOUNTER — HOSPITAL ENCOUNTER (OUTPATIENT)
Dept: INFUSION CENTER | Facility: CLINIC | Age: 64
Discharge: HOME | End: 2024-06-17
Payer: COMMERCIAL

## 2024-06-17 VITALS
WEIGHT: 102.5 LBS | TEMPERATURE: 98.2 F | SYSTOLIC BLOOD PRESSURE: 114 MMHG | BODY MASS INDEX: 19.37 KG/M2 | HEART RATE: 71 BPM | OXYGEN SATURATION: 96 % | DIASTOLIC BLOOD PRESSURE: 78 MMHG

## 2024-06-17 DIAGNOSIS — M05.79 RHEUMATOID ARTHRITIS INVOLVING MULTIPLE SITES WITH POSITIVE RHEUMATOID FACTOR (HCC): Primary | ICD-10-CM

## 2024-06-17 PROCEDURE — 96413 CHEMO IV INFUSION 1 HR: CPT

## 2024-06-17 RX ORDER — SODIUM CHLORIDE 9 MG/ML
20 INJECTION, SOLUTION INTRAVENOUS ONCE
OUTPATIENT
Start: 2024-06-19

## 2024-06-17 RX ORDER — SODIUM CHLORIDE 9 MG/ML
20 INJECTION, SOLUTION INTRAVENOUS ONCE
Status: COMPLETED | OUTPATIENT
Start: 2024-06-17 | End: 2024-06-17

## 2024-06-17 RX ADMIN — SODIUM CHLORIDE 20 ML/HR: 0.9 INJECTION, SOLUTION INTRAVENOUS at 16:15

## 2024-06-17 RX ADMIN — TOCILIZUMAB 360 MG: 20 INJECTION, SOLUTION, CONCENTRATE INTRAVENOUS at 16:12

## 2024-06-17 NOTE — PROGRESS NOTES
Patient presents today for Actemra infusion. Patient offers no complaints. VSS. Patient denies recent illness/infection and or antibiotic use. Peripheral IV inserted without incident.

## 2024-06-17 NOTE — PROGRESS NOTES
Patient tolerated Actemra without issue. PIV removed intact, coban wrap in place. Patient confirms next appt 7/17 at 1430, AVS printed and provided.

## 2024-06-21 NOTE — TELEPHONE ENCOUNTER
We are unable to do virtual visits. I called and spoke with pt and let her know her appt has been switched to an in person. Pt is aware.

## 2024-06-28 NOTE — TELEPHONE ENCOUNTER
Patient called requesting refill for omeprazole. Patient made aware medication was refilled on 06/16/2024 for 90 with 1 refills to [facility]. Patient instructed to contact the pharmacy to obtain refills of medication. Patient verbalized understanding.

## 2024-07-02 ENCOUNTER — FOLLOW UP (OUTPATIENT)
Dept: URBAN - METROPOLITAN AREA CLINIC 6 | Facility: CLINIC | Age: 64
End: 2024-07-02

## 2024-07-02 DIAGNOSIS — H40.1122: ICD-10-CM

## 2024-07-02 DIAGNOSIS — Z98.890: ICD-10-CM

## 2024-07-02 DIAGNOSIS — H40.1113: ICD-10-CM

## 2024-07-02 PROCEDURE — 92012 INTRM OPH EXAM EST PATIENT: CPT

## 2024-07-02 ASSESSMENT — VISUAL ACUITY
OS_CC: 20/60
OD_CC: 20/150+1

## 2024-07-02 ASSESSMENT — TONOMETRY
OS_IOP_MMHG: 17
OD_IOP_MMHG: 22

## 2024-07-17 ENCOUNTER — HOSPITAL ENCOUNTER (OUTPATIENT)
Dept: INFUSION CENTER | Facility: CLINIC | Age: 64
Discharge: HOME | End: 2024-07-17
Payer: COMMERCIAL

## 2024-07-17 VITALS
TEMPERATURE: 97.9 F | WEIGHT: 102 LBS | HEART RATE: 94 BPM | BODY MASS INDEX: 19.27 KG/M2 | DIASTOLIC BLOOD PRESSURE: 88 MMHG | RESPIRATION RATE: 18 BRPM | SYSTOLIC BLOOD PRESSURE: 143 MMHG | OXYGEN SATURATION: 94 %

## 2024-07-17 DIAGNOSIS — M05.79 RHEUMATOID ARTHRITIS INVOLVING MULTIPLE SITES WITH POSITIVE RHEUMATOID FACTOR (HCC): Primary | ICD-10-CM

## 2024-07-17 PROCEDURE — 96413 CHEMO IV INFUSION 1 HR: CPT

## 2024-07-17 RX ORDER — SODIUM CHLORIDE 9 MG/ML
20 INJECTION, SOLUTION INTRAVENOUS ONCE
Status: COMPLETED | OUTPATIENT
Start: 2024-07-17 | End: 2024-07-17

## 2024-07-17 RX ORDER — SODIUM CHLORIDE 9 MG/ML
20 INJECTION, SOLUTION INTRAVENOUS ONCE
OUTPATIENT
Start: 2024-08-12

## 2024-07-17 RX ADMIN — TOCILIZUMAB 360 MG: 20 INJECTION, SOLUTION, CONCENTRATE INTRAVENOUS at 16:06

## 2024-07-17 RX ADMIN — SODIUM CHLORIDE 20 ML/HR: 0.9 INJECTION, SOLUTION INTRAVENOUS at 16:06

## 2024-07-17 NOTE — PROGRESS NOTES
Patient presents to Infusion Center for Actemra. Patient denies recent infection or antibiotic use. PIV placed with brisk blood return.

## 2024-07-17 NOTE — PROGRESS NOTES
Patient tolerated treatment without incident. Confirmed next appt on 8/14 at 15:00 at [place]. Patient provided calendar print out of next appts.

## 2024-07-18 ENCOUNTER — OFFICE VISIT (OUTPATIENT)
Age: 64
End: 2024-07-18
Payer: COMMERCIAL

## 2024-07-18 VITALS
TEMPERATURE: 98.1 F | WEIGHT: 105 LBS | BODY MASS INDEX: 21.17 KG/M2 | DIASTOLIC BLOOD PRESSURE: 70 MMHG | HEART RATE: 66 BPM | HEIGHT: 59 IN | SYSTOLIC BLOOD PRESSURE: 118 MMHG | OXYGEN SATURATION: 99 %

## 2024-07-18 DIAGNOSIS — M05.79 RHEUMATOID ARTHRITIS INVOLVING MULTIPLE SITES WITH POSITIVE RHEUMATOID FACTOR (HCC): Primary | ICD-10-CM

## 2024-07-18 DIAGNOSIS — M81.0 SENILE OSTEOPOROSIS: ICD-10-CM

## 2024-07-18 DIAGNOSIS — M47.817 LUMBOSACRAL SPONDYLOSIS WITHOUT MYELOPATHY: ICD-10-CM

## 2024-07-18 DIAGNOSIS — Z79.899 ENCOUNTER FOR LONG-TERM (CURRENT) USE OF OTHER MEDICATIONS: ICD-10-CM

## 2024-07-18 PROCEDURE — 99214 OFFICE O/P EST MOD 30 MIN: CPT | Performed by: PHYSICIAN ASSISTANT

## 2024-07-18 RX ORDER — PREDNISONE 2.5 MG/1
2.5 TABLET ORAL DAILY
Qty: 90 TABLET | Refills: 1 | Status: SHIPPED | OUTPATIENT
Start: 2024-07-18 | End: 2025-01-14

## 2024-07-18 NOTE — ASSESSMENT & PLAN NOTE
History of osteoporosis previously treated with Fosamax and IV Reclast.  She received dose #3 Reclast in January 2022.  She is due for an updated DEXA scan in November.  We will continue to monitor her bone density every 2 years.  If she does have a decrease in bone density on her upcoming DEXA scan would consider retreatment.

## 2024-07-18 NOTE — PROGRESS NOTES
Ambulatory Visit  Name: Roberta Pierre      : 1960      MRN: [id number]  Encounter Provider: Genna Aponte PA-C  Encounter Date: 2024   Encounter department: [facility] RHEUMATOLOGY [facility]    Assessment & Plan   1. Rheumatoid arthritis involving multiple sites with positive rheumatoid factor (HCC)  Assessment & Plan:  Her rheumatoid arthritis is well-controlled with Actemra infusions and low-dose prednisone.  We will continue to monitor her response to treatment.  No signs of active inflammation or synovitis on exam today.  Labs as ordered to monitor for medication side effects and toxicity.  She is up-to-date with her QuantiFERON gold testing.  Follow-up in 4 months or sooner if needed.  Orders:  -     CBC and differential; Standing  -     Comprehensive metabolic panel; Standing  -     Sedimentation rate, automated; Standing  -     C-reactive protein; Standing  -     Quantiferon TB Gold Plus Assay; Future  -     CBC and differential  -     Comprehensive metabolic panel  -     Sedimentation rate, automated  -     C-reactive protein  -     predniSONE 2.5 mg tablet; Take 1 tablet (2.5 mg total) by mouth daily  2. Senile osteoporosis  Assessment & Plan:  History of osteoporosis previously treated with Fosamax and IV Reclast.  She received dose #3 Reclast in 2022.  She is due for an updated DEXA scan in November.  We will continue to monitor her bone density every 2 years.  If she does have a decrease in bone density on her upcoming DEXA scan would consider retreatment.  Orders:  -     DXA bone density spine hip and pelvis; Future; Expected date: 2024  3. Lumbosacral spondylosis without myelopathy  Assessment & Plan:  Her lower back pain is stable at this time.  If symptoms flare recommend follow-up with pain management.  Encouraged regular home exercise program.  4. Encounter for long-term (current) use of other medications  -     CBC and differential;  Standing  -     Comprehensive metabolic panel; Standing  -     Sedimentation rate, automated; Standing  -     C-reactive protein; Standing  -     Quantiferon TB Gold Plus Assay; Future  -     CBC and differential  -     Comprehensive metabolic panel  -     Sedimentation rate, automated  -     C-reactive protein      History of Present Illness     Roberta Pierre is a 63 y.o. female.  She is here for follow-up of her seropositive rheumatoid arthritis.  She has previously been treated with methotrexate, Arava, Remicade and is now on Actemra infusions.  She had been taking Plaquenil as well however this was recently discontinued in February (2023) due to changes seen by her eye doctor.  She is currently taking low-dose prednisone 2.5 mg as well.  Her rheumatoid arthritis is generally well-controlled with Actemra infusions.  She has minimal stiffness in the morning.  She has no swelling in her joints.     She has a history of carpal tunnel symptoms in her right hand and is following with orthopedics for this.  Carpal tunnel release has been discussed.  She has a history of lumbar degenerative disc disease and follows with pain management.  She gets injections as needed which did provide relief for her.  Her back pain is stable at this time.     She has a history of Sjogren's syndrome with chronic dry eye symptoms.  She also history of glaucoma has been following closely with her eye doctor.  She has a history of [place]'s and follows closely with neurology.      She has a history of osteoporosis and was previously on Fosamax.  She is now on Reclast and received dose #3 on 01/20/2022.  She had a DEXA scan done on 11/17/2022.  Left total hip T score -1.9, femoral neck -1.8.  Right total hip T score -1.9, femoral neck -2.2.  There has been a slight decrease of 2.3% in her hips.  Left forearm T score -3.4.  FRAX hip fracture risk 2.4%, major fracture risk 13%.    She had labs done on 5/21/2024.  CBC, CMP essentially  unremarkable.  ESR, CRP within normal limits.  She is up-to-date with her QuantiFERON gold testing and had a negative test on 2/23/2024.    Review of Systems  Current Outpatient Medications on File Prior to Visit   Medication Sig Dispense Refill    acetaZOLAMIDE (DIAMOX) 250 mg tablet Take 250 mg by mouth 4 (four) times a day      albuterol (ProAir HFA) 90 mcg/act inhaler Inhale 2 puffs every 6 (six) hours as needed for wheezing 8.5 g 5    amLODIPine (NORVASC) 5 mg tablet Take 1 tablet (5 mg total) by mouth daily 90 tablet 3    atorvastatin (LIPITOR) 40 mg tablet Take 1 tablet (40 mg total) by mouth daily 90 tablet 3    Austedo 9 MG TABS TAKE 1 TABLET TWO TIMES A DAY 60 tablet 8    brimonidine-timolol (COMBIGAN) 0.2-0.5 % INSTILL 1 DROP INTO RIGHT EYE TWICE A DAY      buPROPion (WELLBUTRIN XL) 300 mg 24 hr tablet Take 1 tablet (300 mg total) by mouth every morning 90 tablet 1    Cholecalciferol (VITAMIN D3) 2000 units CHEW Daily      Docusate Calcium (STOOL SOFTENER PO) Take 100 mg by mouth daily       escitalopram (LEXAPRO) 20 mg tablet Take 1 tablet (20 mg total) by mouth daily 90 tablet 1    fexofenadine (ALLEGRA) 180 MG tablet Take 1 tablet (180 mg total) by mouth daily as needed (allergy) 30 tablet 6    folic acid (FOLVITE) 1 mg tablet Take 1 mg by mouth daily       latanoprost (XALATAN) 0.005 % ophthalmic solution INSTILL 1 DROP INTO RIGHT EYE AT BEDTIME      levothyroxine 112 mcg tablet Take 1 tablet (112 mcg total) by mouth daily in the early morning 100 tablet 3    levothyroxine 125 mcg tablet Take 1 tablet (125 mcg total) by mouth daily 90 tablet 1    loteprednol etabonate (LOTEMAX) 0.5 % ophthalmic suspension       ofloxacin (OCUFLOX) 0.3 % ophthalmic solution Administer 1 drop to the right eye 4 (four) times a day      omeprazole (PriLOSEC) 20 mg delayed release capsule Take 1 capsule (20 mg total) by mouth daily 90 capsule 1    omeprazole (PriLOSEC) 20 mg delayed release capsule TAKE 1 CAPSULE BY MOUTH  "EVERY DAY 90 capsule 1    Polyethyl Glycol-Propyl Glycol (SYSTANE OP)       prednisoLONE acetate (PRED FORTE) 1 % ophthalmic suspension Administer 1 drop to the right eye 4 (four) times a day      predniSONE 5 mg tablet Take 1/2 tab po daily 45 tablet 1    Rhopressa 0.02 % SOLN INSTILL 1 DROP INTO RIGHT EYE AT BEDTIME      tocilizumab (Actemra) 200 mg/10 mL Infuse 8 mg/kg (402.8 mg) IV every 4 weeks;  Use in combination with 80 mg vials 10 mL 6    tocilizumab (Actemra) 80 mg/4 mL Inject 4 mL (80 mg total) into a catheter in a vein every 28 days Infuse 8 mg/kg (402.8 mg) IV every 4 weeks; use in combination with 200 mg vial 12 mL 6    diclofenac (VOLTAREN) 75 mg EC tablet Take 1 tablet (75 mg total) by mouth 2 (two) times a day 180 tablet 1    ergocalciferol (VITAMIN D2) 50,000 units Take 1 capsule (50,000 Units total) by mouth once a week for 12 doses 12 capsule 0    famciclovir (FAMVIR) 250 MG tablet TAKE 1 TABLET BY MOUTH EVERY DAY 30 tablet 5     Current Facility-Administered Medications on File Prior to Visit   Medication Dose Route Frequency Provider Last Rate Last Admin    alteplase (CATHFLO) injection 2 mg  2 mg Intracatheter Q1MIN PRN Alecia Rob MD        [COMPLETED] sodium chloride 0.9 % infusion  20 mL/hr Intravenous Once Alecia Rob MD   Stopped at 07/17/24 1735    [COMPLETED] tocilizumab (ACTEMRA) 360 mg in sodium chloride 0.9 % 82 mL IVPB  360 mg Intravenous Once Alecia Rob MD   Stopped at 07/17/24 1710      Objective     /70 (BP Location: Left arm, Patient Position: Sitting, Cuff Size: Adult)   Pulse 66   Temp 98.1 °F (36.7 °C) (Temporal)   Ht 4' 11\" (1.499 m)   Wt 47.6 kg (105 lb)   SpO2 99%   BMI 21.21 kg/m²     Physical Exam  Administrative Statements       Dragon Dictation software was used to dictate this note. It may contain errors with dictating incorrect words/spelling. Please contact provider directly for any questions.      "

## 2024-07-18 NOTE — ASSESSMENT & PLAN NOTE
Her rheumatoid arthritis is well-controlled with Actemra infusions and low-dose prednisone.  We will continue to monitor her response to treatment.  No signs of active inflammation or synovitis on exam today.  Labs as ordered to monitor for medication side effects and toxicity.  She is up-to-date with her QuantiFERON gold testing.  Follow-up in 4 months or sooner if needed.

## 2024-07-18 NOTE — ASSESSMENT & PLAN NOTE
Her lower back pain is stable at this time.  If symptoms flare recommend follow-up with pain management.  Encouraged regular home exercise program.

## 2024-07-31 ENCOUNTER — PROBLEM (OUTPATIENT)
Dept: URBAN - METROPOLITAN AREA CLINIC 6 | Facility: CLINIC | Age: 64
End: 2024-07-31

## 2024-07-31 DIAGNOSIS — H04.121: ICD-10-CM

## 2024-07-31 DIAGNOSIS — H40.1122: ICD-10-CM

## 2024-07-31 DIAGNOSIS — H25.13: ICD-10-CM

## 2024-07-31 DIAGNOSIS — Z98.890: ICD-10-CM

## 2024-07-31 DIAGNOSIS — S05.01XA: ICD-10-CM

## 2024-07-31 DIAGNOSIS — H40.1113: ICD-10-CM

## 2024-07-31 PROCEDURE — 92012 INTRM OPH EXAM EST PATIENT: CPT

## 2024-07-31 ASSESSMENT — VISUAL ACUITY
OD_CC: CF 1FT
OS_CC: 20/60

## 2024-07-31 ASSESSMENT — TONOMETRY: OD_IOP_MMHG: 23

## 2024-08-14 ENCOUNTER — HOSPITAL ENCOUNTER (OUTPATIENT)
Dept: INFUSION CENTER | Facility: CLINIC | Age: 64
Discharge: HOME | End: 2024-08-14
Payer: COMMERCIAL

## 2024-08-14 ENCOUNTER — FOLLOW UP (OUTPATIENT)
Dept: URBAN - METROPOLITAN AREA CLINIC 6 | Facility: CLINIC | Age: 64
End: 2024-08-14

## 2024-08-14 VITALS
OXYGEN SATURATION: 95 % | WEIGHT: 103.5 LBS | DIASTOLIC BLOOD PRESSURE: 88 MMHG | RESPIRATION RATE: 18 BRPM | HEART RATE: 81 BPM | SYSTOLIC BLOOD PRESSURE: 126 MMHG | TEMPERATURE: 98.5 F | BODY MASS INDEX: 20.9 KG/M2

## 2024-08-14 DIAGNOSIS — H04.121: ICD-10-CM

## 2024-08-14 DIAGNOSIS — H40.1113: ICD-10-CM

## 2024-08-14 DIAGNOSIS — S05.01XD: ICD-10-CM

## 2024-08-14 DIAGNOSIS — Z98.890: ICD-10-CM

## 2024-08-14 DIAGNOSIS — H40.1122: ICD-10-CM

## 2024-08-14 DIAGNOSIS — M05.79 RHEUMATOID ARTHRITIS INVOLVING MULTIPLE SITES WITH POSITIVE RHEUMATOID FACTOR (HCC): Primary | ICD-10-CM

## 2024-08-14 DIAGNOSIS — H25.13: ICD-10-CM

## 2024-08-14 PROCEDURE — 92012 INTRM OPH EXAM EST PATIENT: CPT

## 2024-08-14 PROCEDURE — 96413 CHEMO IV INFUSION 1 HR: CPT

## 2024-08-14 RX ORDER — SODIUM CHLORIDE 9 MG/ML
20 INJECTION, SOLUTION INTRAVENOUS ONCE
Status: COMPLETED | OUTPATIENT
Start: 2024-08-14 | End: 2024-08-14

## 2024-08-14 RX ORDER — SODIUM CHLORIDE 9 MG/ML
20 INJECTION, SOLUTION INTRAVENOUS ONCE
OUTPATIENT
Start: 2024-09-11

## 2024-08-14 RX ADMIN — SODIUM CHLORIDE 20 ML/HR: 0.9 INJECTION, SOLUTION INTRAVENOUS at 14:28

## 2024-08-14 RX ADMIN — TOCILIZUMAB 360 MG: 20 INJECTION, SOLUTION, CONCENTRATE INTRAVENOUS at 14:51

## 2024-08-14 ASSESSMENT — VISUAL ACUITY
OS_CC: 20/60
OD_CC: CF 1FT

## 2024-08-14 ASSESSMENT — TONOMETRY
OS_IOP_MMHG: 14
OD_IOP_MMHG: 17

## 2024-08-14 NOTE — PROGRESS NOTES
Pt tolerated treatment without incident. Confiremd next apt for 9/11/24 @ 1:30pm @ [place]. Declines AVS.

## 2024-08-15 LAB
ALBUMIN SERPL-MCNC: 4.1 G/DL (ref 3.9–4.9)
ALP SERPL-CCNC: 46 IU/L (ref 44–121)
ALT SERPL-CCNC: 11 IU/L (ref 0–32)
AST SERPL-CCNC: 13 IU/L (ref 0–40)
BASOPHILS # BLD AUTO: 0 X10E3/UL (ref 0–0.2)
BASOPHILS NFR BLD AUTO: 0 %
BILIRUB SERPL-MCNC: 0.4 MG/DL (ref 0–1.2)
BUN SERPL-MCNC: 11 MG/DL (ref 8–27)
BUN/CREAT SERPL: 15 (ref 12–28)
CALCIUM SERPL-MCNC: 9.4 MG/DL (ref 8.7–10.3)
CHLORIDE SERPL-SCNC: 102 MMOL/L (ref 96–106)
CO2 SERPL-SCNC: 23 MMOL/L (ref 20–29)
CREAT SERPL-MCNC: 0.75 MG/DL (ref 0.57–1)
EGFR: 90 ML/MIN/1.73
EOSINOPHIL # BLD AUTO: 0.3 X10E3/UL (ref 0–0.4)
EOSINOPHIL NFR BLD AUTO: 5 %
ERYTHROCYTE [DISTWIDTH] IN BLOOD BY AUTOMATED COUNT: 11.9 % (ref 11.7–15.4)
ERYTHROCYTE [SEDIMENTATION RATE] IN BLOOD BY WESTERGREN METHOD: 2 MM/HR (ref 0–40)
GLOBULIN SER-MCNC: 2.2 G/DL (ref 1.5–4.5)
GLUCOSE SERPL-MCNC: 91 MG/DL (ref 70–99)
HCT VFR BLD AUTO: 40.6 % (ref 34–46.6)
HGB BLD-MCNC: 13.6 G/DL (ref 11.1–15.9)
IMM GRANULOCYTES # BLD: 0 X10E3/UL (ref 0–0.1)
IMM GRANULOCYTES NFR BLD: 0 %
LYMPHOCYTES # BLD AUTO: 1.1 X10E3/UL (ref 0.7–3.1)
LYMPHOCYTES NFR BLD AUTO: 19 %
MCH RBC QN AUTO: 30.2 PG (ref 26.6–33)
MCHC RBC AUTO-ENTMCNC: 33.5 G/DL (ref 31.5–35.7)
MCV RBC AUTO: 90 FL (ref 79–97)
MONOCYTES # BLD AUTO: 0.6 X10E3/UL (ref 0.1–0.9)
MONOCYTES NFR BLD AUTO: 10 %
NEUTROPHILS # BLD AUTO: 3.6 X10E3/UL (ref 1.4–7)
NEUTROPHILS NFR BLD AUTO: 66 %
PLATELET # BLD AUTO: 221 X10E3/UL (ref 150–450)
POTASSIUM SERPL-SCNC: 5 MMOL/L (ref 3.5–5.2)
PROT SERPL-MCNC: 6.3 G/DL (ref 6–8.5)
RBC # BLD AUTO: 4.51 X10E6/UL (ref 3.77–5.28)
SODIUM SERPL-SCNC: 136 MMOL/L (ref 134–144)
WBC # BLD AUTO: 5.6 X10E3/UL (ref 3.4–10.8)

## 2024-08-16 LAB — CRP SERPL-MCNC: <1 MG/L (ref 0–10)

## 2024-08-17 LAB
GAMMA INTERFERON BACKGROUND BLD IA-ACNC: 0 IU/ML
M TB IFN-G CD4+ T-CELLS BLD-ACNC: 0.02 IU/ML
M TB IFN-G CD4+ T-CELLS BLD-ACNC: 0.03 IU/ML
MITOGEN IGNF BLD-ACNC: >10 IU/ML
QUANTIFERON INCUBATION COMMENT: NORMAL
QUANTIFERON-TB GOLD PLUS: NEGATIVE
SERVICE CMNT-IMP: NORMAL

## 2024-09-11 ENCOUNTER — HOSPITAL ENCOUNTER (OUTPATIENT)
Dept: INFUSION CENTER | Facility: CLINIC | Age: 64
Discharge: HOME | End: 2024-09-11
Payer: COMMERCIAL

## 2024-09-11 VITALS
OXYGEN SATURATION: 97 % | WEIGHT: 101 LBS | SYSTOLIC BLOOD PRESSURE: 148 MMHG | TEMPERATURE: 99 F | DIASTOLIC BLOOD PRESSURE: 91 MMHG | HEART RATE: 79 BPM | BODY MASS INDEX: 20.4 KG/M2

## 2024-09-11 DIAGNOSIS — M05.79 RHEUMATOID ARTHRITIS INVOLVING MULTIPLE SITES WITH POSITIVE RHEUMATOID FACTOR (HCC): Primary | ICD-10-CM

## 2024-09-11 PROCEDURE — 96413 CHEMO IV INFUSION 1 HR: CPT

## 2024-09-11 RX ORDER — SODIUM CHLORIDE 9 MG/ML
20 INJECTION, SOLUTION INTRAVENOUS ONCE
Status: COMPLETED | OUTPATIENT
Start: 2024-09-11 | End: 2024-09-11

## 2024-09-11 RX ORDER — SODIUM CHLORIDE 9 MG/ML
20 INJECTION, SOLUTION INTRAVENOUS ONCE
OUTPATIENT
Start: 2024-10-09

## 2024-09-11 RX ADMIN — TOCILIZUMAB 360 MG: 20 INJECTION, SOLUTION, CONCENTRATE INTRAVENOUS at 14:33

## 2024-09-11 RX ADMIN — SODIUM CHLORIDE 20 ML/HR: 0.9 INJECTION, SOLUTION INTRAVENOUS at 14:31

## 2024-09-11 NOTE — PROGRESS NOTES
Pt tolerated treatment well without any adverse reactions. Aware of next appointment 10/9@ 3pm. Hortensia ACEVES.

## 2024-09-11 NOTE — PROGRESS NOTES
Pt here for Actemra, offers no new complaints, denies any recent illness/infection/abx use. Vitals stable upon admission. Call bell within reach.

## 2024-09-23 ENCOUNTER — TELEPHONE (OUTPATIENT)
Age: 64
End: 2024-09-23

## 2024-09-26 DIAGNOSIS — Z87.09 HISTORY OF ASTHMA: ICD-10-CM

## 2024-09-26 DIAGNOSIS — F32.A DEPRESSION, UNSPECIFIED DEPRESSION TYPE: Primary | ICD-10-CM

## 2024-09-26 RX ORDER — ESCITALOPRAM OXALATE 20 MG/1
20 TABLET ORAL DAILY
Qty: 30 TABLET | Refills: 0 | Status: SHIPPED | OUTPATIENT
Start: 2024-09-26

## 2024-09-26 RX ORDER — FEXOFENADINE HCL 180 MG/1
180 TABLET ORAL DAILY PRN
Qty: 90 TABLET | Refills: 1 | Status: SHIPPED | OUTPATIENT
Start: 2024-09-26

## 2024-09-26 RX ORDER — ESCITALOPRAM OXALATE 20 MG/1
20 TABLET ORAL DAILY
Qty: 90 TABLET | Refills: 1 | Status: SHIPPED | OUTPATIENT
Start: 2024-09-26

## 2024-09-26 NOTE — TELEPHONE ENCOUNTER
Reason for call:   [x] Refill   [] Prior Auth  [] Other:     Office:   [x] PCP/Provider - THAI Aden MD   [] Specialty/Provider -     Medication:     fexofenadine (ALLEGRA) 180 MG tablet       Dose/Frequency:     Take 1 tablet (180 mg total) by mouth daily as needed (allergy)       Medication: escitalopram (LEXAPRO) 20 mg tablet     Dose/Frequency:     Take 1 tablet (20 mg total) by mouth daily       [facility]     Does the patient have enough for 3 days?   [] Yes   [x] No - Send as HP to POD    Short term order   Medication:     escitalopram (LEXAPRO) 20 mg tablet       Dose/Frequency:     Take 1 tablet (20 mg total) by mouth daily       QTY: 30 days    Pharmacy: [facility]

## 2024-10-09 ENCOUNTER — HOSPITAL ENCOUNTER (OUTPATIENT)
Dept: INFUSION CENTER | Facility: CLINIC | Age: 64
Discharge: HOME | End: 2024-10-09

## 2024-10-10 ENCOUNTER — TELEPHONE (OUTPATIENT)
Dept: INFUSION CENTER | Facility: CLINIC | Age: 64
End: 2024-10-10

## 2024-10-10 NOTE — TELEPHONE ENCOUNTER
Pt called [facility] and stated she couldn't make her 2pm appt on 10/10. Rescheduled pt to 10/15 so spouse could drive her.

## 2024-10-14 DIAGNOSIS — G10 HUNTINGTON DISEASE (HCC): ICD-10-CM

## 2024-10-14 DIAGNOSIS — M05.79 RHEUMATOID ARTHRITIS INVOLVING MULTIPLE SITES WITH POSITIVE RHEUMATOID FACTOR (HCC): Primary | ICD-10-CM

## 2024-10-14 RX ORDER — SODIUM CHLORIDE 9 MG/ML
20 INJECTION, SOLUTION INTRAVENOUS ONCE
Status: CANCELLED | OUTPATIENT
Start: 2024-10-15

## 2024-10-15 ENCOUNTER — HOSPITAL ENCOUNTER (OUTPATIENT)
Dept: INFUSION CENTER | Facility: CLINIC | Age: 64
Discharge: HOME | End: 2024-10-15
Payer: COMMERCIAL

## 2024-10-15 VITALS
OXYGEN SATURATION: 100 % | HEART RATE: 71 BPM | BODY MASS INDEX: 20 KG/M2 | WEIGHT: 99 LBS | SYSTOLIC BLOOD PRESSURE: 130 MMHG | RESPIRATION RATE: 18 BRPM | DIASTOLIC BLOOD PRESSURE: 74 MMHG | TEMPERATURE: 96.7 F

## 2024-10-15 DIAGNOSIS — M05.79 RHEUMATOID ARTHRITIS INVOLVING MULTIPLE SITES WITH POSITIVE RHEUMATOID FACTOR (HCC): Primary | ICD-10-CM

## 2024-10-15 PROCEDURE — 96413 CHEMO IV INFUSION 1 HR: CPT

## 2024-10-15 RX ORDER — DEUTETRABENAZINE 9 MG/1
TABLET, COATED ORAL
Qty: 180 TABLET | Refills: 1 | Status: SHIPPED | OUTPATIENT
Start: 2024-10-15

## 2024-10-15 RX ORDER — SODIUM CHLORIDE 9 MG/ML
20 INJECTION, SOLUTION INTRAVENOUS ONCE
Status: COMPLETED | OUTPATIENT
Start: 2024-10-15 | End: 2024-10-15

## 2024-10-15 RX ORDER — SODIUM CHLORIDE 9 MG/ML
20 INJECTION, SOLUTION INTRAVENOUS ONCE
OUTPATIENT
Start: 2024-11-12

## 2024-10-15 RX ADMIN — SODIUM CHLORIDE 20 ML/HR: 0.9 INJECTION, SOLUTION INTRAVENOUS at 10:37

## 2024-10-15 RX ADMIN — TOCILIZUMAB 360 MG: 20 INJECTION, SOLUTION, CONCENTRATE INTRAVENOUS at 11:15

## 2024-10-15 NOTE — PROGRESS NOTES
Pt here for actemra, denies any s/s of illness or recent abx use. Tolerated tx well with no complaints at this time. Next appt 11/12 at 1030 at [place]. Printout provided.

## 2024-10-16 ENCOUNTER — TELEPHONE (OUTPATIENT)
Age: 64
End: 2024-10-16

## 2024-10-20 DIAGNOSIS — F32.A DEPRESSION, UNSPECIFIED DEPRESSION TYPE: ICD-10-CM

## 2024-10-20 RX ORDER — ESCITALOPRAM OXALATE 20 MG/1
20 TABLET ORAL DAILY
Qty: 90 TABLET | Refills: 1 | Status: SHIPPED | OUTPATIENT
Start: 2024-10-20

## 2024-10-23 ENCOUNTER — TELEPHONE (OUTPATIENT)
Age: 64
End: 2024-10-23

## 2024-10-23 NOTE — TELEPHONE ENCOUNTER
Spoke with pt and made her aware that per Dr. Rob she should not be driving if her glaucoma is giving her issues until she is cleared by ophthalmology. Pt placed on waiting list for sooner appt in Nov/Dec

## 2024-10-23 NOTE — TELEPHONE ENCOUNTER
Pt calling to r/s her apt 10/28 as her glaucoma is acting up. No available apts until may, pt states she does not want to wait that long and will try to drive with her condition. Advised pt to speak to nurse but declined

## 2024-10-25 ENCOUNTER — RA CDI HCC (OUTPATIENT)
Dept: OTHER | Facility: HOSPITAL | Age: 64
End: 2024-10-25

## 2024-10-25 NOTE — PROGRESS NOTES
HCC coding opportunities       Chart reviewed, no opportunity found: CHART REVIEWED, NO OPPORTUNITY FOUND        Patients Insurance        Commercial Insurance: Causes Insurance

## 2024-11-01 ENCOUNTER — OFFICE VISIT (OUTPATIENT)
Dept: FAMILY MEDICINE CLINIC | Facility: CLINIC | Age: 64
End: 2024-11-01
Payer: COMMERCIAL

## 2024-11-01 VITALS
SYSTOLIC BLOOD PRESSURE: 140 MMHG | BODY MASS INDEX: 19.76 KG/M2 | OXYGEN SATURATION: 96 % | WEIGHT: 98 LBS | HEIGHT: 59 IN | HEART RATE: 76 BPM | TEMPERATURE: 97.9 F | DIASTOLIC BLOOD PRESSURE: 86 MMHG | RESPIRATION RATE: 12 BRPM

## 2024-11-01 DIAGNOSIS — Z00.00 PE (PHYSICAL EXAM), ROUTINE: Primary | ICD-10-CM

## 2024-11-01 DIAGNOSIS — Z12.31 ENCOUNTER FOR SCREENING MAMMOGRAM FOR BREAST CANCER: ICD-10-CM

## 2024-11-01 DIAGNOSIS — Z23 ENCOUNTER FOR IMMUNIZATION: ICD-10-CM

## 2024-11-01 DIAGNOSIS — Z13.220 SCREENING CHOLESTEROL LEVEL: ICD-10-CM

## 2024-11-01 DIAGNOSIS — E03.9 ACQUIRED HYPOTHYROIDISM: ICD-10-CM

## 2024-11-01 DIAGNOSIS — G10 HUNTINGTON DISEASE (HCC): ICD-10-CM

## 2024-11-01 PROCEDURE — 90471 IMMUNIZATION ADMIN: CPT | Performed by: FAMILY MEDICINE

## 2024-11-01 PROCEDURE — 90673 RIV3 VACCINE NO PRESERV IM: CPT | Performed by: FAMILY MEDICINE

## 2024-11-01 PROCEDURE — 99396 PREV VISIT EST AGE 40-64: CPT | Performed by: FAMILY MEDICINE

## 2024-11-01 NOTE — PROGRESS NOTES
Adult Annual Physical  Name: Roberta Pierre      : 1960      MRN: [id number]  Encounter Provider: Dory Aden MD  Encounter Date: 2024   Encounter department: [facility]    Assessment & Plan  PE (physical exam), routine         Encounter for immunization    Orders:    Flublok (recombinant) 0.5 mL IM    Encounter for screening mammogram for breast cancer    Orders:    Mammo screening bilateral w 3d and cad; Future    Screening cholesterol level    Orders:    Lipid panel; Future    Lipid panel    Acquired hypothyroidism    Orders:    TSH, 3rd generation; Future    T4, free; Future    TSH, 3rd generation    T4, free    Anne Marie disease (HCC)    Orders:    CBC and differential; Future    Comprehensive metabolic panel; Future    Ambulatory Referral to Neurology; Future    CBC and differential    Comprehensive metabolic panel      [place]/             History of Present Illness     Adult Annual Physical  64-year-old female presenting to the office for physical exam.  Unfortunately patient is battling glaucoma and has been having a lot of worsening pains.  States that she feels that her Anne Marie's has been declining because she has not been recovering from her glaucoma.  She has been taking all her medications as prescribed without any side effects    Review of Systems   Constitutional:  Negative for activity change, appetite change, chills, fatigue and fever.   HENT:  Negative for congestion.    Eyes:  Positive for pain, discharge, redness and visual disturbance.   Respiratory:  Negative for cough, chest tightness and shortness of breath.    Cardiovascular:  Negative for chest pain and leg swelling.   Gastrointestinal:  Negative for abdominal distention, abdominal pain, constipation, diarrhea, nausea and vomiting.   Neurological:  Positive for weakness.   All other systems reviewed and are negative.    Current Outpatient Medications on File Prior to Visit   Medication  Sig Dispense Refill    acetaZOLAMIDE (DIAMOX) 250 mg tablet Take 250 mg by mouth 4 (four) times a day      albuterol (ProAir HFA) 90 mcg/act inhaler Inhale 2 puffs every 6 (six) hours as needed for wheezing 8.5 g 5    amLODIPine (NORVASC) 5 mg tablet Take 1 tablet (5 mg total) by mouth daily 90 tablet 3    atorvastatin (LIPITOR) 40 mg tablet Take 1 tablet (40 mg total) by mouth daily 90 tablet 3    brimonidine-timolol (COMBIGAN) 0.2-0.5 % INSTILL 1 DROP INTO RIGHT EYE TWICE A DAY      buPROPion (WELLBUTRIN XL) 300 mg 24 hr tablet Take 1 tablet (300 mg total) by mouth every morning 90 tablet 1    Cholecalciferol (VITAMIN D3) 2000 units CHEW Daily      Deutetrabenazine (Austedo) 9 MG TABS TAKE 1 TABLET TWO TIMES A  tablet 1    diclofenac (VOLTAREN) 75 mg EC tablet TAKE 1 TABLET TWICE A  tablet 3    Docusate Calcium (STOOL SOFTENER PO) Take 100 mg by mouth daily       escitalopram (LEXAPRO) 20 mg tablet Take 1 tablet (20 mg total) by mouth daily 90 tablet 1    escitalopram (LEXAPRO) 20 mg tablet TAKE 1 TABLET BY MOUTH EVERY DAY 90 tablet 1    fexofenadine (ALLEGRA) 180 MG tablet Take 1 tablet (180 mg total) by mouth daily as needed (allergy) 90 tablet 1    folic acid (FOLVITE) 1 mg tablet Take 1 mg by mouth daily       latanoprost (XALATAN) 0.005 % ophthalmic solution INSTILL 1 DROP INTO RIGHT EYE AT BEDTIME      levothyroxine 112 mcg tablet Take 1 tablet (112 mcg total) by mouth daily in the early morning 100 tablet 3    levothyroxine 125 mcg tablet Take 1 tablet (125 mcg total) by mouth daily 90 tablet 1    loteprednol etabonate (LOTEMAX) 0.5 % ophthalmic suspension       ofloxacin (OCUFLOX) 0.3 % ophthalmic solution Administer 1 drop to the right eye 4 (four) times a day      omeprazole (PriLOSEC) 20 mg delayed release capsule Take 1 capsule (20 mg total) by mouth daily 90 capsule 1    omeprazole (PriLOSEC) 20 mg delayed release capsule TAKE 1 CAPSULE BY MOUTH EVERY DAY 90 capsule 1    Polyethyl  "Glycol-Propyl Glycol (SYSTANE OP)       prednisoLONE acetate (PRED FORTE) 1 % ophthalmic suspension Administer 1 drop to the right eye 4 (four) times a day      predniSONE 2.5 mg tablet Take 1 tablet (2.5 mg total) by mouth daily 90 tablet 1    predniSONE 5 mg tablet Take 1/2 tab po daily 45 tablet 1    Rhopressa 0.02 % SOLN INSTILL 1 DROP INTO RIGHT EYE AT BEDTIME      tocilizumab (Actemra) 200 mg/10 mL Infuse 8 mg/kg (402.8 mg) IV every 4 weeks;  Use in combination with 80 mg vials 10 mL 6    tocilizumab (Actemra) 80 mg/4 mL Inject 4 mL (80 mg total) into a catheter in a vein every 28 days Infuse 8 mg/kg (402.8 mg) IV every 4 weeks; use in combination with 200 mg vial 12 mL 6    ergocalciferol (VITAMIN D2) 50,000 units Take 1 capsule (50,000 Units total) by mouth once a week for 12 doses 12 capsule 0    famciclovir (FAMVIR) 250 MG tablet TAKE 1 TABLET BY MOUTH EVERY DAY 30 tablet 5     No current facility-administered medications on file prior to visit.      Social History     Tobacco Use    Smoking status: Never    Smokeless tobacco: Never   Vaping Use    Vaping status: Never Used   Substance and Sexual Activity    Alcohol use: Not Currently     Comment: SOCIAL     Drug use: Never    Sexual activity: Not on file       Objective     /86 (BP Location: Left arm, Patient Position: Sitting, Cuff Size: Standard)   Pulse 76   Temp 97.9 °F (36.6 °C) (Temporal)   Resp 12   Ht 4' 11\" (1.499 m)   Wt 44.5 kg (98 lb)   SpO2 96%   BMI 19.79 kg/m²     Physical Exam  Vitals reviewed.   Constitutional:       Appearance: Normal appearance. She is well-developed.   HENT:      Head: Normocephalic and atraumatic.      Right Ear: Tympanic membrane, ear canal and external ear normal. There is no impacted cerumen.      Left Ear: Tympanic membrane, ear canal and external ear normal. There is no impacted cerumen.      Nose: Nose normal.      Mouth/Throat:      Mouth: Mucous membranes are moist.      Pharynx: Oropharynx is " clear.   Eyes:      Conjunctiva/sclera:      Right eye: Right conjunctiva is injected.      Left eye: Left conjunctiva is injected.      Pupils: Pupils are equal, round, and reactive to light.   Cardiovascular:      Rate and Rhythm: Normal rate and regular rhythm.      Heart sounds: Normal heart sounds.   Pulmonary:      Effort: Pulmonary effort is normal.      Breath sounds: Normal breath sounds.   Abdominal:      General: Abdomen is flat. Bowel sounds are normal.      Palpations: Abdomen is soft.   Musculoskeletal:         General: Normal range of motion.      Cervical back: Normal range of motion and neck supple.   Skin:     General: Skin is warm.      Capillary Refill: Capillary refill takes less than 2 seconds.   Neurological:      General: No focal deficit present.      Mental Status: She is alert and oriented to person, place, and time. Mental status is at baseline.   Psychiatric:         Mood and Affect: Mood normal.         Behavior: Behavior normal.         Thought Content: Thought content normal.         Judgment: Judgment normal.

## 2024-11-01 NOTE — ASSESSMENT & PLAN NOTE
Orders:    CBC and differential; Future    Comprehensive metabolic panel; Future    Ambulatory Referral to Neurology; Future    CBC and differential    Comprehensive metabolic panel

## 2024-11-13 ENCOUNTER — HOSPITAL ENCOUNTER (OUTPATIENT)
Dept: INFUSION CENTER | Facility: CLINIC | Age: 64
Discharge: HOME | End: 2024-11-13
Payer: COMMERCIAL

## 2024-11-13 ENCOUNTER — IOP CHECK (OUTPATIENT)
Dept: URBAN - METROPOLITAN AREA CLINIC 6 | Facility: CLINIC | Age: 64
End: 2024-11-13

## 2024-11-13 VITALS
HEART RATE: 74 BPM | TEMPERATURE: 97.6 F | DIASTOLIC BLOOD PRESSURE: 95 MMHG | RESPIRATION RATE: 18 BRPM | OXYGEN SATURATION: 98 % | BODY MASS INDEX: 20 KG/M2 | SYSTOLIC BLOOD PRESSURE: 166 MMHG | WEIGHT: 99 LBS

## 2024-11-13 DIAGNOSIS — H40.1122: ICD-10-CM

## 2024-11-13 DIAGNOSIS — H25.13: ICD-10-CM

## 2024-11-13 DIAGNOSIS — M05.79 RHEUMATOID ARTHRITIS INVOLVING MULTIPLE SITES WITH POSITIVE RHEUMATOID FACTOR (HCC): Primary | ICD-10-CM

## 2024-11-13 DIAGNOSIS — H04.121: ICD-10-CM

## 2024-11-13 DIAGNOSIS — H40.1113: ICD-10-CM

## 2024-11-13 PROCEDURE — 96413 CHEMO IV INFUSION 1 HR: CPT

## 2024-11-13 PROCEDURE — 92083 EXTENDED VISUAL FIELD XM: CPT

## 2024-11-13 PROCEDURE — 99214 OFFICE O/P EST MOD 30 MIN: CPT

## 2024-11-13 RX ORDER — SODIUM CHLORIDE 9 MG/ML
20 INJECTION, SOLUTION INTRAVENOUS ONCE
Status: COMPLETED | OUTPATIENT
Start: 2024-11-13 | End: 2024-11-13

## 2024-11-13 RX ORDER — SODIUM CHLORIDE 9 MG/ML
20 INJECTION, SOLUTION INTRAVENOUS ONCE
OUTPATIENT
Start: 2024-12-10

## 2024-11-13 RX ADMIN — SODIUM CHLORIDE 20 ML/HR: 9 INJECTION, SOLUTION INTRAVENOUS at 09:11

## 2024-11-13 RX ADMIN — TOCILIZUMAB 360 MG: 20 INJECTION, SOLUTION, CONCENTRATE INTRAVENOUS at 09:12

## 2024-11-13 ASSESSMENT — TONOMETRY
OD_IOP_MMHG: 12
OD_IOP_MMHG: 15
OS_IOP_MMHG: 14
OS_IOP_MMHG: 13

## 2024-11-13 ASSESSMENT — VISUAL ACUITY
OS_CC: 20/30+1
OU_CC: J7
OD_CC: CF 1FT

## 2024-11-13 NOTE — PROGRESS NOTES
Patient tolerated treatment without complications. Patient declined AVS. Reviewed upcoming appointments with patient. Patient is aware of next appointment scheduled at 12/10 1130. Patient tolerated 30 minute ordered observation.  notified for  and patient escorted to lobby.

## 2024-11-22 DIAGNOSIS — E03.9 HYPOTHYROIDISM, UNSPECIFIED TYPE: ICD-10-CM

## 2024-11-22 RX ORDER — LEVOTHYROXINE SODIUM 125 UG/1
125 TABLET ORAL DAILY
Qty: 30 TABLET | Refills: 5 | Status: SHIPPED | OUTPATIENT
Start: 2024-11-22

## 2024-12-07 DIAGNOSIS — K21.9 GASTROESOPHAGEAL REFLUX DISEASE, UNSPECIFIED WHETHER ESOPHAGITIS PRESENT: ICD-10-CM

## 2024-12-08 DIAGNOSIS — B00.9 HERPES: ICD-10-CM

## 2024-12-09 RX ORDER — FAMCICLOVIR 250 MG/1
250 TABLET ORAL DAILY
Qty: 90 TABLET | Refills: 1 | Status: SHIPPED | OUTPATIENT
Start: 2024-12-09 | End: 2025-01-08

## 2024-12-11 ENCOUNTER — HOSPITAL ENCOUNTER (OUTPATIENT)
Dept: INFUSION CENTER | Facility: CLINIC | Age: 64
Discharge: HOME | End: 2024-12-11
Payer: COMMERCIAL

## 2024-12-11 VITALS
HEART RATE: 66 BPM | OXYGEN SATURATION: 98 % | WEIGHT: 104.5 LBS | RESPIRATION RATE: 18 BRPM | TEMPERATURE: 98 F | SYSTOLIC BLOOD PRESSURE: 133 MMHG | DIASTOLIC BLOOD PRESSURE: 88 MMHG | BODY MASS INDEX: 21.11 KG/M2

## 2024-12-11 DIAGNOSIS — M05.79 RHEUMATOID ARTHRITIS INVOLVING MULTIPLE SITES WITH POSITIVE RHEUMATOID FACTOR (HCC): Primary | ICD-10-CM

## 2024-12-11 PROCEDURE — 96413 CHEMO IV INFUSION 1 HR: CPT

## 2024-12-11 RX ORDER — SODIUM CHLORIDE 9 MG/ML
20 INJECTION, SOLUTION INTRAVENOUS ONCE
Status: COMPLETED | OUTPATIENT
Start: 2024-12-11 | End: 2024-12-11

## 2024-12-11 RX ORDER — SODIUM CHLORIDE 9 MG/ML
20 INJECTION, SOLUTION INTRAVENOUS ONCE
OUTPATIENT
Start: 2025-01-08

## 2024-12-11 RX ADMIN — SODIUM CHLORIDE 20 ML/HR: 9 INJECTION, SOLUTION INTRAVENOUS at 07:41

## 2024-12-11 RX ADMIN — TOCILIZUMAB 360 MG: 20 INJECTION, SOLUTION, CONCENTRATE INTRAVENOUS at 08:33

## 2024-12-11 NOTE — PROGRESS NOTES
Pt arrives to infusion center for Actemra. Offers no complaints, s/s of illness/infection, or current antibiotic use. PIV accessed by JACK RODRIGUEZ without issue. Pt sitting comfortably in chair, wheels locked, call bell within reach.

## 2024-12-11 NOTE — PROGRESS NOTES
Attempted to contact patient  for notification of  = home number no longer in use per patient which is listed for , 's mobile not on file. Patient tolerated Actemra without issue. PIV removed intact, coban wrap in place. Patient aware of next appt 1/8 at 1530, AVS printed and provided.

## 2024-12-18 DIAGNOSIS — F32.A DEPRESSION, UNSPECIFIED DEPRESSION TYPE: ICD-10-CM

## 2024-12-18 RX ORDER — BUPROPION HYDROCHLORIDE 300 MG/1
300 TABLET ORAL EVERY MORNING
Qty: 90 TABLET | Refills: 1 | Status: SHIPPED | OUTPATIENT
Start: 2024-12-18

## 2024-12-18 NOTE — TELEPHONE ENCOUNTER
Patient would like it filled for a 90 day supply.    Reason for call:   [x] Refill   [] Prior Auth  [] Other:     Office:   [x] PCP/Provider - Dory Aden MD   [] Specialty/Provider -     Medication: buPROPion (WELLBUTRIN XL) 300 mg 24 hr tablet     Dose/Frequency: Take 1 tablet (300 mg total) by mouth every morning     Quantity: 90    Pharmacy: [facility]/pharmacy #[id number] - [place], [place] - [facility] [address]     Does the patient have enough for 3 days?   [] Yes   [x] No - Send as HP to POD

## 2024-12-31 DIAGNOSIS — F32.A DEPRESSION, UNSPECIFIED DEPRESSION TYPE: ICD-10-CM

## 2025-01-01 RX ORDER — BUPROPION HYDROCHLORIDE 300 MG/1
300 TABLET ORAL EVERY MORNING
Qty: 90 TABLET | Refills: 1 | Status: SHIPPED | OUTPATIENT
Start: 2025-01-01

## 2025-01-08 ENCOUNTER — HOSPITAL ENCOUNTER (OUTPATIENT)
Dept: INFUSION CENTER | Facility: CLINIC | Age: 65
Discharge: HOME | End: 2025-01-08
Payer: COMMERCIAL

## 2025-01-08 VITALS
RESPIRATION RATE: 20 BRPM | SYSTOLIC BLOOD PRESSURE: 124 MMHG | WEIGHT: 100 LBS | TEMPERATURE: 98.1 F | BODY MASS INDEX: 20.2 KG/M2 | DIASTOLIC BLOOD PRESSURE: 86 MMHG | HEART RATE: 73 BPM

## 2025-01-08 DIAGNOSIS — M05.79 RHEUMATOID ARTHRITIS INVOLVING MULTIPLE SITES WITH POSITIVE RHEUMATOID FACTOR (HCC): Primary | ICD-10-CM

## 2025-01-08 PROCEDURE — 96413 CHEMO IV INFUSION 1 HR: CPT

## 2025-01-08 RX ORDER — SODIUM CHLORIDE 9 MG/ML
20 INJECTION, SOLUTION INTRAVENOUS ONCE
OUTPATIENT
Start: 2025-02-05

## 2025-01-08 RX ORDER — SODIUM CHLORIDE 9 MG/ML
20 INJECTION, SOLUTION INTRAVENOUS ONCE
Status: COMPLETED | OUTPATIENT
Start: 2025-01-08 | End: 2025-01-08

## 2025-01-08 RX ADMIN — TOCILIZUMAB 360 MG: 20 INJECTION, SOLUTION, CONCENTRATE INTRAVENOUS at 15:43

## 2025-01-08 RX ADMIN — SODIUM CHLORIDE 20 ML/HR: 9 INJECTION, SOLUTION INTRAVENOUS at 15:22

## 2025-01-08 NOTE — PROGRESS NOTES
Pt here for actemra, denies and s/s of illness or recent abx use. Tolerated tx well with no complaints at this time. Plan sent to scheduling to make her next appt. Declined AVS.

## 2025-01-28 ENCOUNTER — TELEPHONE (OUTPATIENT)
Age: 65
End: 2025-01-28

## 2025-01-28 NOTE — TELEPHONE ENCOUNTER
Called patient. She had labs done recently with rheumatologist, some of which are duplicate of labs ordered by Dr Emerson In November. Please advise if updated lab order is needed. Do not see order for neuro in chart , patient says she discussed with Dr Emerson at visit last November need to see neuro Coty Blackwell for [place]'s syndrome. Please place order in chart. I advised that Dr Emerson is not in office today.

## 2025-01-28 NOTE — TELEPHONE ENCOUNTER
Pt called in stating she needed her lab orders faxed to [email or web address] in [place] at [phone]. Orders were faxed.    She would also like her Neurology referral mailed to her at her home address. She says she's going to a new office that's closer to her house.

## 2025-01-29 DIAGNOSIS — G10 HUNTINGTON DISEASE (HCC): Primary | ICD-10-CM

## 2025-01-30 NOTE — TELEPHONE ENCOUNTER
I just reviewed the orders in the chart. I referred her in the past and placed a new one for Dr Anand or Adam at [place]

## 2025-01-31 ENCOUNTER — RA CDI HCC (OUTPATIENT)
Dept: OTHER | Facility: HOSPITAL | Age: 65
End: 2025-01-31

## 2025-01-31 NOTE — PROGRESS NOTES
HCC coding opportunities       Chart reviewed, no opportunity found: CHART REVIEWED, NO OPPORTUNITY FOUND        Patients Insurance        Commercial Insurance: [employer]

## 2025-02-02 LAB
ALBUMIN SERPL-MCNC: 3.9 G/DL (ref 3.9–4.9)
ALP SERPL-CCNC: 44 IU/L (ref 44–121)
ALT SERPL-CCNC: 12 IU/L (ref 0–32)
AST SERPL-CCNC: 12 IU/L (ref 0–40)
BASOPHILS # BLD AUTO: 0 X10E3/UL (ref 0–0.2)
BASOPHILS NFR BLD AUTO: 1 %
BILIRUB SERPL-MCNC: 0.5 MG/DL (ref 0–1.2)
BUN SERPL-MCNC: 11 MG/DL (ref 8–27)
BUN/CREAT SERPL: 15 (ref 12–28)
CALCIUM SERPL-MCNC: 8.8 MG/DL (ref 8.7–10.3)
CHLORIDE SERPL-SCNC: 99 MMOL/L (ref 96–106)
CHOLEST SERPL-MCNC: 155 MG/DL (ref 100–199)
CHOLEST/HDLC SERPL: 2.7 RATIO (ref 0–4.4)
CO2 SERPL-SCNC: 25 MMOL/L (ref 20–29)
CREAT SERPL-MCNC: 0.74 MG/DL (ref 0.57–1)
EGFR: 91 ML/MIN/1.73
EOSINOPHIL # BLD AUTO: 0.4 X10E3/UL (ref 0–0.4)
EOSINOPHIL NFR BLD AUTO: 10 %
ERYTHROCYTE [DISTWIDTH] IN BLOOD BY AUTOMATED COUNT: 11.3 % (ref 11.7–15.4)
GLOBULIN SER-MCNC: 2.1 G/DL (ref 1.5–4.5)
GLUCOSE SERPL-MCNC: 81 MG/DL (ref 70–99)
HCT VFR BLD AUTO: 37 % (ref 34–46.6)
HDLC SERPL-MCNC: 58 MG/DL
HGB BLD-MCNC: 12.5 G/DL (ref 11.1–15.9)
IMM GRANULOCYTES # BLD: 0 X10E3/UL (ref 0–0.1)
IMM GRANULOCYTES NFR BLD: 0 %
LDLC SERPL CALC-MCNC: 83 MG/DL (ref 0–99)
LYMPHOCYTES # BLD AUTO: 1.3 X10E3/UL (ref 0.7–3.1)
LYMPHOCYTES NFR BLD AUTO: 31 %
MCH RBC QN AUTO: 31.5 PG (ref 26.6–33)
MCHC RBC AUTO-ENTMCNC: 33.8 G/DL (ref 31.5–35.7)
MCV RBC AUTO: 93 FL (ref 79–97)
MICRODELETION SYND BLD/T FISH: NORMAL
MONOCYTES # BLD AUTO: 0.6 X10E3/UL (ref 0.1–0.9)
MONOCYTES NFR BLD AUTO: 13 %
NEUTROPHILS # BLD AUTO: 2 X10E3/UL (ref 1.4–7)
NEUTROPHILS NFR BLD AUTO: 45 %
PLATELET # BLD AUTO: 207 X10E3/UL (ref 150–450)
POTASSIUM SERPL-SCNC: 4.8 MMOL/L (ref 3.5–5.2)
PROT SERPL-MCNC: 6 G/DL (ref 6–8.5)
RBC # BLD AUTO: 3.97 X10E6/UL (ref 3.77–5.28)
SL AMB VLDL CHOLESTEROL CALC: 14 MG/DL (ref 5–40)
SODIUM SERPL-SCNC: 134 MMOL/L (ref 134–144)
T4 FREE SERPL-MCNC: 2.47 NG/DL (ref 0.82–1.77)
TRIGL SERPL-MCNC: 75 MG/DL (ref 0–149)
TSH SERPL DL<=0.005 MIU/L-ACNC: 0.03 UIU/ML (ref 0.45–4.5)
WBC # BLD AUTO: 4.3 X10E3/UL (ref 3.4–10.8)

## 2025-02-03 ENCOUNTER — RESULTS FOLLOW-UP (OUTPATIENT)
Dept: FAMILY MEDICINE CLINIC | Facility: CLINIC | Age: 65
End: 2025-02-03

## 2025-02-03 DIAGNOSIS — E03.9 HYPOTHYROIDISM, UNSPECIFIED TYPE: Primary | ICD-10-CM

## 2025-02-05 ENCOUNTER — HOSPITAL ENCOUNTER (OUTPATIENT)
Dept: INFUSION CENTER | Facility: CLINIC | Age: 65
Discharge: HOME | End: 2025-02-05
Payer: COMMERCIAL

## 2025-02-05 VITALS
HEART RATE: 73 BPM | OXYGEN SATURATION: 98 % | TEMPERATURE: 99 F | BODY MASS INDEX: 19.69 KG/M2 | WEIGHT: 97.5 LBS | DIASTOLIC BLOOD PRESSURE: 86 MMHG | SYSTOLIC BLOOD PRESSURE: 129 MMHG

## 2025-02-05 DIAGNOSIS — M05.79 RHEUMATOID ARTHRITIS INVOLVING MULTIPLE SITES WITH POSITIVE RHEUMATOID FACTOR (HCC): ICD-10-CM

## 2025-02-05 DIAGNOSIS — M05.79 RHEUMATOID ARTHRITIS INVOLVING MULTIPLE SITES WITH POSITIVE RHEUMATOID FACTOR (HCC): Primary | ICD-10-CM

## 2025-02-05 RX ORDER — SODIUM CHLORIDE 9 MG/ML
20 INJECTION, SOLUTION INTRAVENOUS ONCE
OUTPATIENT
Start: 2025-03-05

## 2025-02-05 RX ORDER — SODIUM CHLORIDE 9 MG/ML
20 INJECTION, SOLUTION INTRAVENOUS ONCE
Status: COMPLETED | OUTPATIENT
Start: 2025-02-05 | End: 2025-02-05

## 2025-02-05 RX ADMIN — SODIUM CHLORIDE 20 ML/HR: 0.9 INJECTION, SOLUTION INTRAVENOUS at 15:42

## 2025-02-05 RX ADMIN — TOCILIZUMAB 360 MG: 20 INJECTION, SOLUTION, CONCENTRATE INTRAVENOUS at 16:17

## 2025-02-05 NOTE — PROGRESS NOTES
Pt tolerated tx without issue. PIV removed. Pt confirmed next appointment on 3/05 @1500 AN. AVS declined.

## 2025-02-05 NOTE — PROGRESS NOTES
Patient presents to the Infusion Center for the treatment of Actemra. She offers no concerns at this time. Patient declines any recent illness, infection, or antibiotic use. PIV placed in her Left AC with good blood return. Patient is resting comfortably in the chair, call bell within reach.

## 2025-02-06 ENCOUNTER — OFFICE VISIT (OUTPATIENT)
Dept: FAMILY MEDICINE CLINIC | Facility: CLINIC | Age: 65
End: 2025-02-06
Payer: COMMERCIAL

## 2025-02-06 VITALS
HEART RATE: 92 BPM | BODY MASS INDEX: 20.12 KG/M2 | TEMPERATURE: 98.2 F | RESPIRATION RATE: 16 BRPM | WEIGHT: 99.8 LBS | SYSTOLIC BLOOD PRESSURE: 120 MMHG | HEIGHT: 59 IN | OXYGEN SATURATION: 96 % | DIASTOLIC BLOOD PRESSURE: 82 MMHG

## 2025-02-06 DIAGNOSIS — G10 HUNTINGTON DISEASE (HCC): Primary | ICD-10-CM

## 2025-02-06 DIAGNOSIS — E55.9 VITAMIN D DEFICIENCY: ICD-10-CM

## 2025-02-06 DIAGNOSIS — F41.8 DEPRESSION WITH ANXIETY: ICD-10-CM

## 2025-02-06 DIAGNOSIS — J45.20 MILD INTERMITTENT ASTHMA WITHOUT COMPLICATION: ICD-10-CM

## 2025-02-06 PROCEDURE — 99214 OFFICE O/P EST MOD 30 MIN: CPT | Performed by: FAMILY MEDICINE

## 2025-02-06 RX ORDER — ERGOCALCIFEROL 1.25 MG/1
50000 CAPSULE, LIQUID FILLED ORAL WEEKLY
Qty: 8 CAPSULE | Refills: 0 | Status: SHIPPED | OUTPATIENT
Start: 2025-02-06 | End: 2025-03-28

## 2025-02-06 RX ORDER — SERTRALINE HYDROCHLORIDE 25 MG/1
25 TABLET, FILM COATED ORAL DAILY
Qty: 30 TABLET | Refills: 5 | Status: SHIPPED | OUTPATIENT
Start: 2025-02-06 | End: 2025-08-05

## 2025-02-06 RX ORDER — PREDNISONE 2.5 MG/1
2.5 TABLET ORAL DAILY
Qty: 30 TABLET | Refills: 2 | Status: SHIPPED | OUTPATIENT
Start: 2025-02-06

## 2025-02-06 RX ORDER — ESCITALOPRAM OXALATE 10 MG/1
10 TABLET ORAL DAILY
Qty: 14 TABLET | Refills: 0 | Status: SHIPPED | OUTPATIENT
Start: 2025-02-06 | End: 2025-02-11 | Stop reason: SDUPTHER

## 2025-02-06 RX ORDER — ERYTHROMYCIN 5 MG/G
0.5 OINTMENT OPHTHALMIC ONCE
COMMUNITY
Start: 2025-02-03

## 2025-02-06 NOTE — ASSESSMENT & PLAN NOTE
Orders:  •  Vitamin D 25 hydroxy; Future  •  ergocalciferol (VITAMIN D2) 50,000 units; Take 1 capsule (50,000 Units total) by mouth once a week for 8 doses Once completed please start taking OTC D3 2000 units daily

## 2025-02-06 NOTE — PROGRESS NOTES
Name: Roberta Pierre      : 1960      MRN: [id number]  Encounter Provider: Dory Aden MD  Encounter Date: 2025   Encounter department: [facility]  :  Assessment & Plan  Vitamin D deficiency    Orders:  •  Vitamin D 25 hydroxy; Future  •  ergocalciferol (VITAMIN D2) 50,000 units; Take 1 capsule (50,000 Units total) by mouth once a week for 8 doses Once completed please start taking OTC D3 2000 units daily    Mild intermittent asthma without complication  Currently stable       Idamay disease (HCC)  Referred to a new neurologist to establish care       Depression with anxiety  We will be weaning the patient off of Lexapro; will break it down to 10 mg daily and then transition to Zoloft unless patient feels that she needs a longer wean.  Orders:  •  escitalopram (LEXAPRO) 10 mg tablet; Take 1 tablet (10 mg total) by mouth daily Then start zoloft after  •  sertraline (ZOLOFT) 25 mg tablet; Take 1 tablet (25 mg total) by mouth daily           History of Present Illness   HPI  64-year-old female presenting to the office for evaluation.  Patient states that she continues to have photosensitivity being managed by the specialist.  States that she does not feel like her depression is very well-controlled.  Also has anxiety would like to try something different if possible denies any other acute concerns      Review of Systems   Constitutional:  Negative for activity change, appetite change, chills, fatigue and fever.   HENT:  Negative for congestion.    Respiratory:  Negative for cough, chest tightness and shortness of breath.    Cardiovascular:  Negative for chest pain and leg swelling.   Gastrointestinal:  Negative for abdominal distention, abdominal pain, constipation, diarrhea, nausea and vomiting.   All other systems reviewed and are negative.      Objective   /82 (BP Location: Left arm, Patient Position: Sitting, Cuff Size: Standard)   Pulse 92   Temp 98.2 °F  "(36.8 °C) (Temporal)   Resp 16   Ht 4' 11\" (1.499 m)   Wt 45.3 kg (99 lb 12.8 oz)   SpO2 96%   BMI 20.16 kg/m²      Physical Exam  Vitals reviewed.   Constitutional:       Appearance: Normal appearance. She is well-developed.   HENT:      Head: Normocephalic and atraumatic.      Right Ear: Tympanic membrane, ear canal and external ear normal. There is no impacted cerumen.      Left Ear: Tympanic membrane, ear canal and external ear normal. There is no impacted cerumen.      Nose: Nose normal.      Mouth/Throat:      Mouth: Mucous membranes are moist.      Pharynx: Oropharynx is clear.   Eyes:      Conjunctiva/sclera: Conjunctivae normal.      Pupils: Pupils are equal, round, and reactive to light.   Cardiovascular:      Rate and Rhythm: Normal rate and regular rhythm.      Heart sounds: Normal heart sounds.   Pulmonary:      Effort: Pulmonary effort is normal.      Breath sounds: Normal breath sounds.   Abdominal:      General: Abdomen is flat. Bowel sounds are normal.      Palpations: Abdomen is soft.   Musculoskeletal:         General: Normal range of motion.      Cervical back: Normal range of motion and neck supple.   Skin:     General: Skin is warm.      Capillary Refill: Capillary refill takes less than 2 seconds.   Neurological:      General: No focal deficit present.      Mental Status: She is alert and oriented to person, place, and time. Mental status is at baseline.   Psychiatric:         Mood and Affect: Mood normal.         Behavior: Behavior normal.         Thought Content: Thought content normal.         Judgment: Judgment normal.         "

## 2025-02-06 NOTE — ASSESSMENT & PLAN NOTE
We will be weaning the patient off of Lexapro; will break it down to 10 mg daily and then transition to Zoloft unless patient feels that she needs a longer wean.  Orders:  •  escitalopram (LEXAPRO) 10 mg tablet; Take 1 tablet (10 mg total) by mouth daily Then start zoloft after  •  sertraline (ZOLOFT) 25 mg tablet; Take 1 tablet (25 mg total) by mouth daily

## 2025-02-07 NOTE — TELEPHONE ENCOUNTER
Left message to call back about results ----- Message from Dory Aden MD sent at 2/3/2025  9:44 PM EST -----  Please contact the patient and advise her her thyroid levels are still slightly overtreated.  I would like her to start skipping her dose on Sunday and repeat this blood work in 6 weeks      I am extremely happy to report that her cholesterol though is on the mend and doing a lot better

## 2025-02-10 ENCOUNTER — TELEPHONE (OUTPATIENT)
Age: 65
End: 2025-02-10

## 2025-02-10 NOTE — TELEPHONE ENCOUNTER
----- Message from Dory Aden MD sent at 2/3/2025  9:44 PM EST -----  Please contact the patient and advise her her thyroid levels are still slightly overtreated.  I would like her to start skipping her dose on Sunday and repeat this blood work in 6 weeks      I am extremely happy to report that her cholesterol though is on the mend and doing a lot better

## 2025-02-11 DIAGNOSIS — F41.8 DEPRESSION WITH ANXIETY: ICD-10-CM

## 2025-02-11 RX ORDER — ESCITALOPRAM OXALATE 5 MG/1
5 TABLET ORAL DAILY
Qty: 7 TABLET | Refills: 0 | Status: SHIPPED | OUTPATIENT
Start: 2025-02-11 | End: 2025-02-18

## 2025-02-12 NOTE — TELEPHONE ENCOUNTER
----- Message from Dory Aden MD sent at 2/11/2025 11:12 PM EST -----  Sent in the 5 mg did advise her that if she was doing good on the 10 mg we could jump directly from 10 after 14 days to Zoloft.  But I sent in the 5 mg so that we can slowly wean her way into Zoloft

## 2025-02-27 ENCOUNTER — TELEPHONE (OUTPATIENT)
Dept: FAMILY MEDICINE CLINIC | Facility: CLINIC | Age: 65
End: 2025-02-27

## 2025-02-27 DIAGNOSIS — E55.9 VITAMIN D DEFICIENCY: ICD-10-CM

## 2025-02-27 RX ORDER — ERGOCALCIFEROL 1.25 MG/1
CAPSULE, LIQUID FILLED ORAL
Qty: 12 CAPSULE | Refills: 1 | Status: SHIPPED | OUTPATIENT
Start: 2025-02-27

## 2025-02-27 NOTE — TELEPHONE ENCOUNTER
Patient's  dropped off jury excuse form. Patient unable to serve due to history of rheumatoid arthritis, david's disease and Sjogren's disease. Form placed in Dr Emerson folder.

## 2025-02-28 ENCOUNTER — TELEPHONE (OUTPATIENT)
Age: 65
End: 2025-02-28

## 2025-03-06 ENCOUNTER — HOSPITAL ENCOUNTER (OUTPATIENT)
Dept: INFUSION CENTER | Facility: CLINIC | Age: 65
Discharge: HOME | End: 2025-03-06
Payer: COMMERCIAL

## 2025-03-06 VITALS
BODY MASS INDEX: 19.29 KG/M2 | SYSTOLIC BLOOD PRESSURE: 116 MMHG | HEART RATE: 76 BPM | OXYGEN SATURATION: 98 % | TEMPERATURE: 97.9 F | DIASTOLIC BLOOD PRESSURE: 81 MMHG | RESPIRATION RATE: 18 BRPM | WEIGHT: 95.5 LBS

## 2025-03-06 DIAGNOSIS — M05.79 RHEUMATOID ARTHRITIS INVOLVING MULTIPLE SITES WITH POSITIVE RHEUMATOID FACTOR (HCC): Primary | ICD-10-CM

## 2025-03-06 PROCEDURE — 96413 CHEMO IV INFUSION 1 HR: CPT

## 2025-03-06 RX ORDER — SODIUM CHLORIDE 9 MG/ML
20 INJECTION, SOLUTION INTRAVENOUS ONCE
Status: COMPLETED | OUTPATIENT
Start: 2025-03-06 | End: 2025-03-06

## 2025-03-06 RX ORDER — SODIUM CHLORIDE 9 MG/ML
20 INJECTION, SOLUTION INTRAVENOUS ONCE
OUTPATIENT
Start: 2025-04-02

## 2025-03-06 RX ADMIN — TOCILIZUMAB 360 MG: 20 INJECTION, SOLUTION, CONCENTRATE INTRAVENOUS at 16:05

## 2025-03-06 RX ADMIN — SODIUM CHLORIDE 20 ML/HR: 0.9 INJECTION, SOLUTION INTRAVENOUS at 15:20

## 2025-03-06 NOTE — PROGRESS NOTES
Patient to Infusion Center for Actemra: Offers no complaints at present time: Lab work ( 02/01/25 ) reviewed: Within parameters to treat: Denies any recent infection / illness: Right AC PIV initiated without incident

## 2025-03-12 ENCOUNTER — ESTABLISHED COMPREHENSIVE EXAM (OUTPATIENT)
Dept: URBAN - METROPOLITAN AREA CLINIC 6 | Facility: CLINIC | Age: 65
End: 2025-03-12

## 2025-03-12 DIAGNOSIS — H17.89: ICD-10-CM

## 2025-03-12 DIAGNOSIS — H04.121: ICD-10-CM

## 2025-03-12 DIAGNOSIS — H40.1122: ICD-10-CM

## 2025-03-12 DIAGNOSIS — H40.1113: ICD-10-CM

## 2025-03-12 DIAGNOSIS — H25.13: ICD-10-CM

## 2025-03-12 PROCEDURE — 92012 INTRM OPH EXAM EST PATIENT: CPT

## 2025-03-12 PROCEDURE — 92133 CPTRZD OPH DX IMG PST SGM ON: CPT

## 2025-03-12 ASSESSMENT — TONOMETRY
OD_IOP_MMHG: 16
OS_IOP_MMHG: 17

## 2025-03-12 ASSESSMENT — VISUAL ACUITY: OS_CC: 20/25

## 2025-04-02 ENCOUNTER — HOSPITAL ENCOUNTER (OUTPATIENT)
Dept: INFUSION CENTER | Facility: CLINIC | Age: 65
End: 2025-04-02

## 2025-04-08 DIAGNOSIS — M05.79 RHEUMATOID ARTHRITIS INVOLVING MULTIPLE SITES WITH POSITIVE RHEUMATOID FACTOR (HCC): Primary | ICD-10-CM

## 2025-04-08 RX ORDER — SODIUM CHLORIDE 9 MG/ML
20 INJECTION, SOLUTION INTRAVENOUS ONCE
Status: CANCELLED | OUTPATIENT
Start: 2025-04-09

## 2025-04-09 ENCOUNTER — HOSPITAL ENCOUNTER (OUTPATIENT)
Dept: INFUSION CENTER | Facility: CLINIC | Age: 65
Discharge: HOME | End: 2025-04-09
Payer: COMMERCIAL

## 2025-04-09 ENCOUNTER — OFFICE VISIT (OUTPATIENT)
Age: 65
End: 2025-04-09
Payer: COMMERCIAL

## 2025-04-09 VITALS
OXYGEN SATURATION: 97 % | TEMPERATURE: 97.2 F | DIASTOLIC BLOOD PRESSURE: 73 MMHG | RESPIRATION RATE: 16 BRPM | SYSTOLIC BLOOD PRESSURE: 112 MMHG | WEIGHT: 97 LBS | HEART RATE: 71 BPM | BODY MASS INDEX: 19.26 KG/M2

## 2025-04-09 VITALS — WEIGHT: 97 LBS | HEIGHT: 60 IN | BODY MASS INDEX: 19.04 KG/M2

## 2025-04-09 DIAGNOSIS — Z86.0100 HISTORY OF COLON POLYPS: ICD-10-CM

## 2025-04-09 DIAGNOSIS — K21.9 GASTROESOPHAGEAL REFLUX DISEASE, UNSPECIFIED WHETHER ESOPHAGITIS PRESENT: Primary | ICD-10-CM

## 2025-04-09 DIAGNOSIS — M05.79 RHEUMATOID ARTHRITIS INVOLVING MULTIPLE SITES WITH POSITIVE RHEUMATOID FACTOR (HCC): Primary | ICD-10-CM

## 2025-04-09 PROCEDURE — 99213 OFFICE O/P EST LOW 20 MIN: CPT | Performed by: PHYSICIAN ASSISTANT

## 2025-04-09 RX ORDER — SODIUM CHLORIDE 9 MG/ML
20 INJECTION, SOLUTION INTRAVENOUS ONCE
Status: COMPLETED | OUTPATIENT
Start: 2025-04-09 | End: 2025-04-09

## 2025-04-09 RX ORDER — SODIUM CHLORIDE 9 MG/ML
20 INJECTION, SOLUTION INTRAVENOUS ONCE
OUTPATIENT
Start: 2025-04-09

## 2025-04-09 RX ORDER — SODIUM CHLORIDE, SODIUM LACTATE, POTASSIUM CHLORIDE, CALCIUM CHLORIDE 600; 310; 30; 20 MG/100ML; MG/100ML; MG/100ML; MG/100ML
125 INJECTION, SOLUTION INTRAVENOUS CONTINUOUS
OUTPATIENT
Start: 2025-04-09

## 2025-04-09 RX ORDER — OMEPRAZOLE 20 MG/1
20 CAPSULE, DELAYED RELEASE ORAL DAILY
Qty: 90 CAPSULE | Refills: 3 | Status: SHIPPED | OUTPATIENT
Start: 2025-04-09

## 2025-04-09 RX ORDER — OMEPRAZOLE 20 MG/1
20 CAPSULE, DELAYED RELEASE ORAL DAILY
Qty: 30 CAPSULE | Refills: 11 | Status: SHIPPED | OUTPATIENT
Start: 2025-04-09 | End: 2025-04-09

## 2025-04-09 RX ADMIN — SODIUM CHLORIDE 20 ML/HR: 0.9 INJECTION, SOLUTION INTRAVENOUS at 15:05

## 2025-04-09 RX ADMIN — TOCILIZUMAB 360 MG: 20 INJECTION, SOLUTION, CONCENTRATE INTRAVENOUS at 15:41

## 2025-04-09 NOTE — PROGRESS NOTES
Name: Roberta Pierre      : 1960      MRN: [id number]  Encounter Provider: Charlee Quinteros PA-C  Encounter Date: 2025   Encounter department: [facility]  :  Assessment & Plan  Gastroesophageal reflux disease, unspecified whether esophagitis present  Last EGD 2019 small hiatal hernia otherwise biopsies unremarkable and no repeat recommended unless alarm symptoms.  Patient is on omeprazole 20 mg daily.  She has no symptoms at this time.    -Continue omeprazole 20 mg once daily in the morning.  She is doing very well on this.  We will continue.  - No indication for repeat EGD.  Consider in the future if any alarm symptoms.    History of colon polyps  Last colonoscopy 2019 was unremarkable and repeat recommended in 5 years due to history of polyps.  No GI symptoms.  No family history.    - Patient is due for colonoscopy. We discussed risks and benefits of pursuing this.  She is doing well at this time from Anne Marie standpoint.  -Schedule colonoscopy.  - Discussed the risk of procedure including bleeding, infection and perforation.  - MiraLAX/Dulcolax prep.    -Continue risk versus benefit of proceeding with repeat colonoscopies in the future based on disease progression of Paupack's.      Patient can follow up in 1 year or reach out earlier if needed.    History of Present Illness   Roberta Pierre is a 64 y.o. female who presents with asthma, Anne Marie's disease, RA, Sjogrens, MDD who presents for follow-up after last being seen in  for med refill.    Patient reports she has been doing very well on Prilosec 20 mg daily.  She is not having any reflux symptoms while taking this.  She has occasional difficulty swallowing however this is at baseline in the setting of Sjogren's and her Anne Marie's.  She is trying to eat maintain her weight by drinking 2 protein shakes a day.  She reports her bowel movements are regular.  She takes stool softeners twice  daily.  No blood in the stool.    No family history of colon cancer.    Most recent lab work with normal hemoglobin and CMP.    Patient's last EGD and colonoscopy 5/2019.  Colonoscopy at that time was unremarkable and repeat recommended in 5 years.  EGD with small hiatal hernia, gastritis and duodenitis.  Biopsies negative for microscopic colitis, H. pylori and celiac.    HPI    Review of Systems   All other systems reviewed and are negative.   A complete review of systems is negative other than that noted above in the HPI.      Current Outpatient Medications   Medication Sig Dispense Refill    acetaZOLAMIDE (DIAMOX) 250 mg tablet Take 250 mg by mouth 4 (four) times a day      albuterol (ProAir HFA) 90 mcg/act inhaler Inhale 2 puffs every 6 (six) hours as needed for wheezing 8.5 g 5    amLODIPine (NORVASC) 5 mg tablet Take 1 tablet (5 mg total) by mouth daily 90 tablet 3    atorvastatin (LIPITOR) 40 mg tablet Take 1 tablet (40 mg total) by mouth daily 90 tablet 3    brimonidine-timolol (COMBIGAN) 0.2-0.5 % INSTILL 1 DROP INTO RIGHT EYE TWICE A DAY      buPROPion (WELLBUTRIN XL) 300 mg 24 hr tablet Take 1 tablet (300 mg total) by mouth every morning 90 tablet 1    Cholecalciferol (VITAMIN D3) 2000 units CHEW Daily      Deutetrabenazine (Austedo) 9 MG TABS TAKE 1 TABLET TWO TIMES A  tablet 1    diclofenac (VOLTAREN) 75 mg EC tablet TAKE 1 TABLET TWICE A  tablet 3    Docusate Calcium (STOOL SOFTENER PO) Take 100 mg by mouth daily       ergocalciferol (VITAMIN D2) 50,000 units TAKE 1 CAPSULE BY MOUTH ONCE A WEEK FOR 8 DOSES ONCE COMPLETED START TAKING OTC D3 2000 UNITS DAILY 12 capsule 1    erythromycin (ILOTYCIN) ophthalmic ointment Administer 0.5 inches to both eyes once      escitalopram (LEXAPRO) 5 mg tablet Take 1 tablet (5 mg total) by mouth daily for 7 days Then start zoloft after 7 tablet 0    famciclovir (FAMVIR) 250 MG tablet Take 1 tablet (250 mg total) by mouth daily 90 tablet 1    fexofenadine  (ALLEGRA) 180 MG tablet Take 1 tablet (180 mg total) by mouth daily as needed (allergy) 90 tablet 1    folic acid (FOLVITE) 1 mg tablet Take 1 mg by mouth daily       latanoprost (XALATAN) 0.005 % ophthalmic solution INSTILL 1 DROP INTO RIGHT EYE AT BEDTIME      levothyroxine 112 mcg tablet Take 1 tablet (112 mcg total) by mouth daily in the early morning 100 tablet 3    levothyroxine 125 mcg tablet TAKE 1 TABLET BY MOUTH EVERY DAY (Patient not taking: Reported on 2/6/2025) 30 tablet 5    loteprednol etabonate (LOTEMAX) 0.5 % ophthalmic suspension       ofloxacin (OCUFLOX) 0.3 % ophthalmic solution Administer 1 drop to the right eye 4 (four) times a day      omeprazole (PriLOSEC) 20 mg delayed release capsule TAKE 1 CAPSULE BY MOUTH EVERY DAY 30 capsule 5    Polyethyl Glycol-Propyl Glycol (SYSTANE OP)  (Patient not taking: Reported on 2/6/2025)      prednisoLONE acetate (PRED FORTE) 1 % ophthalmic suspension Administer 1 drop to the right eye 4 (four) times a day (Patient not taking: Reported on 2/6/2025)      predniSONE 2.5 mg tablet TAKE 1 TABLET BY MOUTH EVERY DAY 30 tablet 2    Rhopressa 0.02 % SOLN INSTILL 1 DROP INTO RIGHT EYE AT BEDTIME (Patient not taking: Reported on 2/6/2025)      sertraline (ZOLOFT) 25 mg tablet Take 1 tablet (25 mg total) by mouth daily 30 tablet 5    tocilizumab (Actemra) 200 mg/10 mL Infuse 8 mg/kg (402.8 mg) IV every 4 weeks;  Use in combination with 80 mg vials 10 mL 6    tocilizumab (Actemra) 80 mg/4 mL Inject 4 mL (80 mg total) into a catheter in a vein every 28 days Infuse 8 mg/kg (402.8 mg) IV every 4 weeks; use in combination with 200 mg vial 12 mL 6     No current facility-administered medications for this visit.     Objective   There were no vitals taken for this visit.    Physical Exam  Vitals reviewed.   Constitutional:       General: She is not in acute distress.     Appearance: Normal appearance. She is not ill-appearing.   HENT:      Head: Normocephalic and atraumatic.    Eyes:      Conjunctiva/sclera: Conjunctivae normal.   Cardiovascular:      Rate and Rhythm: Normal rate and regular rhythm.      Heart sounds: No murmur heard.  Pulmonary:      Effort: Pulmonary effort is normal. No respiratory distress.      Breath sounds: Normal breath sounds.   Abdominal:      General: Abdomen is flat. There is no distension.      Palpations: Abdomen is soft.      Tenderness: There is no abdominal tenderness. There is no guarding or rebound.   Musculoskeletal:         General: No swelling.      Cervical back: Normal range of motion.      Right lower leg: No edema.      Left lower leg: No edema.   Skin:     General: Skin is warm.      Coloration: Skin is not jaundiced.   Neurological:      General: No focal deficit present.      Mental Status: She is alert and oriented to person, place, and time. Mental status is at baseline.   Psychiatric:         Mood and Affect: Mood normal.         Behavior: Behavior normal.            Lab Results: I personally reviewed relevant lab results.       Results for orders placed during the hospital encounter of 05/16/19    EGD    Impression  Overall Impression:  1. Diminutive hiatal hernia.  2. Mild patchy gastritis noted in the body and antrum.  3. Mild duodenitis within the duodenal bulb, duodenal sweep and D2.    RECOMMENDATION:  1. Follow-up biopsy results in 2-3 weeks.  2. Avoid the use of NSAIDs.  3. Continue PPI as previously recommended.  4. Avoid fatty foods, chocolates, caffeine, alcohol and any other triggering foods.  Avoid eating for at least 3 hours before going to bed.

## 2025-04-09 NOTE — PATIENT INSTRUCTIONS
Scheduled date of colonoscopy (as of today): May 1, 2025  Physician performing colonoscopy: Dr. Ornelas  Location of colonoscopy: [facility]  Bowel prep reviewed with patient: Miralax/Dulcolax  Instructions reviewed with patient by: ALVIN  Clearances: NA

## 2025-04-09 NOTE — PROGRESS NOTES
Patient presents to the Infusion Center for the treatment of Actemra. She offers no concerns at this time. Patient declines any recent illness, infection, or current antibiotic use. Vital signs stable. PIV placed in her Left forearm with good blood return. Patient is resting comfortably in the chair, call bell within reach.

## 2025-04-15 ENCOUNTER — TELEPHONE (OUTPATIENT)
Age: 65
End: 2025-04-15

## 2025-04-15 NOTE — TELEPHONE ENCOUNTER
Patient received a neuro referral and was confused about the phone # and name of the neurologist.  Explained and reassured patient, [facility] neurology.  Patient acknowledged understanding. NFA.

## 2025-04-16 ENCOUNTER — ANESTHESIA EVENT (OUTPATIENT)
Dept: ANESTHESIOLOGY | Facility: HOSPITAL | Age: 65
End: 2025-04-16

## 2025-04-16 ENCOUNTER — ANESTHESIA (OUTPATIENT)
Dept: ANESTHESIOLOGY | Facility: HOSPITAL | Age: 65
End: 2025-04-16

## 2025-04-29 ENCOUNTER — TELEPHONE (OUTPATIENT)
Age: 65
End: 2025-04-29

## 2025-04-29 DIAGNOSIS — G10 HUNTINGTON DISEASE (HCC): ICD-10-CM

## 2025-04-30 RX ORDER — DEUTETRABENAZINE 9 MG/1
TABLET, COATED ORAL
Qty: 180 TABLET | Refills: 1 | Status: SHIPPED | OUTPATIENT
Start: 2025-04-30

## 2025-05-01 ENCOUNTER — ANESTHESIA (OUTPATIENT)
Dept: GASTROENTEROLOGY | Facility: AMBULARY SURGERY CENTER | Age: 65
End: 2025-05-01
Payer: COMMERCIAL

## 2025-05-01 ENCOUNTER — HOSPITAL ENCOUNTER (OUTPATIENT)
Dept: GASTROENTEROLOGY | Facility: AMBULARY SURGERY CENTER | Age: 65
Setting detail: OUTPATIENT SURGERY
End: 2025-05-01
Attending: INTERNAL MEDICINE
Payer: COMMERCIAL

## 2025-05-01 VITALS
DIASTOLIC BLOOD PRESSURE: 76 MMHG | SYSTOLIC BLOOD PRESSURE: 119 MMHG | TEMPERATURE: 97.8 F | RESPIRATION RATE: 18 BRPM | OXYGEN SATURATION: 99 % | HEART RATE: 71 BPM

## 2025-05-01 DIAGNOSIS — Z86.0100 HISTORY OF COLON POLYPS: ICD-10-CM

## 2025-05-01 PROCEDURE — G0121 COLON CA SCRN NOT HI RSK IND: HCPCS | Performed by: INTERNAL MEDICINE

## 2025-05-01 RX ORDER — LIDOCAINE HYDROCHLORIDE 10 MG/ML
INJECTION, SOLUTION EPIDURAL; INFILTRATION; INTRACAUDAL; PERINEURAL AS NEEDED
Status: DISCONTINUED | OUTPATIENT
Start: 2025-05-01 | End: 2025-05-01

## 2025-05-01 RX ORDER — PROPOFOL 10 MG/ML
INJECTION, EMULSION INTRAVENOUS CONTINUOUS PRN
Status: DISCONTINUED | OUTPATIENT
Start: 2025-05-01 | End: 2025-05-01

## 2025-05-01 RX ORDER — PROPOFOL 10 MG/ML
INJECTION, EMULSION INTRAVENOUS AS NEEDED
Status: DISCONTINUED | OUTPATIENT
Start: 2025-05-01 | End: 2025-05-01

## 2025-05-01 RX ORDER — SODIUM CHLORIDE, SODIUM LACTATE, POTASSIUM CHLORIDE, CALCIUM CHLORIDE 600; 310; 30; 20 MG/100ML; MG/100ML; MG/100ML; MG/100ML
125 INJECTION, SOLUTION INTRAVENOUS CONTINUOUS
Status: DISPENSED | OUTPATIENT
Start: 2025-05-01

## 2025-05-01 RX ADMIN — PROPOFOL 100 MG: 10 INJECTION, EMULSION INTRAVENOUS at 07:32

## 2025-05-01 RX ADMIN — LIDOCAINE HYDROCHLORIDE 50 MG: 10 INJECTION, SOLUTION EPIDURAL; INFILTRATION; INTRACAUDAL; PERINEURAL at 07:32

## 2025-05-01 RX ADMIN — PROPOFOL 100 MCG/KG/MIN: 10 INJECTION, EMULSION INTRAVENOUS at 07:32

## 2025-05-01 RX ADMIN — SODIUM CHLORIDE, SODIUM LACTATE, POTASSIUM CHLORIDE, AND CALCIUM CHLORIDE: .6; .31; .03; .02 INJECTION, SOLUTION INTRAVENOUS at 07:32

## 2025-05-01 NOTE — TELEPHONE ENCOUNTER
Submitted PA for austedo via [facility], [id number]  received approval: Your request has been approved  CaseId:[id number];Status:Approved;Review Type:Prior Auth;Coverage Start Date:04/01/2025;Coverage End Date:05/01/2026    Called patient to advise; reached , left detailed message.

## 2025-05-01 NOTE — H&P
History and Physical -  [facility]  Roberta Pierre 64 y.o. female MRN: [id number]    HPI: Roberta Pierre is a 64 y.o. year old female who presents for colon cancer screening, has hx of polyps.       Review of Systems    Historical Information   Past Medical History:   Diagnosis Date    Allergic rhinitis     Anxiety     Colitis     Colon polyps     + history for colon polyps    Constipation 12/09/2020    Depression     Disease of thyroid gland     Encounter for screening mammogram for malignant neoplasm of breast 06/13/2021    Full dentures     GERD (gastroesophageal reflux disease)     Glaucoma     Glaucoma     Herpes gingivostomatitis     Herpes simplex infection     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HCC)     Hyperlipidemia     Hypertension     new onset 05/2019    Multiple benign polyps of large intestine     Osteoarthritis     Osteopenia     Osteoporosis     RA (rheumatoid arthritis) (HCC)     Sjogren's disease (HCC)      Past Surgical History:   Procedure Laterality Date    COLONOSCOPY      DENTAL SURGERY      upper and lower extractions    ESOPHAGOGASTRODUODENOSCOPY      DIAGNOSTIC    GLAUCOMA SURGERY Right 02/15/2024    INCONTINENCE SURGERY      LAPAROSCOPIC SLING OPERATION FOR STRESS INCONTINENCE     UPPER GASTROINTESTINAL ENDOSCOPY       Social History   Social History     Substance and Sexual Activity   Alcohol Use Not Currently    Comment: SOCIAL      Social History     Substance and Sexual Activity   Drug Use Never     Social History     Tobacco Use   Smoking Status Never   Smokeless Tobacco Never     Family History   Problem Relation Age of Onset    Barry's disease Father     Alcohol abuse Father     Coronary artery disease Family     Hypertension Mother     Diabetes Mother     Meniere's disease Brother     Lupus Daughter     Immunodeficiency Son     Diabetes Paternal Grandmother     Heart disease Paternal Grandmother     No Known Problems Sister     No Known Problems Sister     No Known  Problems Sister     Meniere's disease Brother     No Known Problems Maternal Aunt     No Known Problems Maternal Aunt        Meds/Allergies     Not in a hospital admission.    Allergies   Allergen Reactions    Gold Itching    Sulfa Antibiotics Hives and Rash    Valacyclovir Palpitations       Objective     /67   Pulse 78   Temp 97.8 °F (36.6 °C) (Temporal)   Resp 18   SpO2 99%       PHYSICAL EXAM    Gen: NAD  CV: RRR  CHEST: Clear  ABD: soft, NT/ND  EXT: no edema  Neuro: AAO      ASSESSMENT/PLAN:  This is a 64 y.o. year old female here for colon cancer screening, has history of colon cancer screening.     PLAN:   Procedure: Colonoscopy.

## 2025-05-01 NOTE — ANESTHESIA POSTPROCEDURE EVALUATION
Post-Op Assessment Note    CV Status:  Stable  Pain Score: 0    Pain management: adequate       Mental Status:  Sleepy and arousable   Hydration Status:  Stable   PONV Controlled:  None   Airway Patency:  Patent and adequate  Two or more mitigation strategies used for obstructive sleep apnea   Post Op Vitals Reviewed: Yes    No anethesia notable event occurred.    Staff: CRNA           Last Filed PACU Vitals:  Vitals Value Taken Time   Temp     Pulse 70 05/01/25 0757   /65 05/01/25 0757   Resp 16 05/01/25 0757   SpO2 100 % 05/01/25 0757

## 2025-05-01 NOTE — ANESTHESIA PREPROCEDURE EVALUATION
Procedure:  COLONOSCOPY    Relevant Problems   CARDIO   (+) Hypercholesterolemia      ENDO   (+) Hypothyroidism      MUSCULOSKELETAL   (+) Independence disease (HCC)   (+) Rheumatoid arthritis involving multiple sites with positive rheumatoid factor (HCC)      NEURO/PSYCH   (+) Chronic pain syndrome   (+) Depression with anxiety   (+) Frequent headaches   (+) Independence disease (HCC)   (+) Major depressive disorder, single episode, mild (HCC)      PULMONARY   (+) Mild intermittent asthma without complication        Physical Exam    Airway    Mallampati score: I  TM Distance: >3 FB  Neck ROM: full     Dental        Cardiovascular      Pulmonary      Other Findings  post-pubertal.      Anesthesia Plan  ASA Score- 3     Anesthesia Type- IV sedation with anesthesia with ASA Monitors.         Additional Monitors:     Airway Plan:            Plan Factors-    Chart reviewed.    Patient summary reviewed.                  Induction- intravenous.    Postoperative Plan-     Perioperative Resuscitation Plan - Level 1 - Full Code.       Informed Consent- Anesthetic plan and risks discussed with patient.  I personally reviewed this patient with the CRNA. Discussed and agreed on the Anesthesia Plan with the CRNA..      NPO Status:  Vitals Value Taken Time   Date of last liquid 05/01/25 05/01/25 0657   Time of last liquid 0600 05/01/25 0657   Date of last solid 04/29/25 05/01/25 0657   Time of last solid 1800 05/01/25 0657

## 2025-05-01 NOTE — PERIOPERATIVE NURSING NOTE
Bedside report given to ESMER RN . Bed in lowest locked position with both rails up. Call bell within reach.

## 2025-05-04 DIAGNOSIS — M05.79 RHEUMATOID ARTHRITIS INVOLVING MULTIPLE SITES WITH POSITIVE RHEUMATOID FACTOR (HCC): ICD-10-CM

## 2025-05-05 DIAGNOSIS — E78.00 HYPERCHOLESTEROLEMIA: ICD-10-CM

## 2025-05-05 DIAGNOSIS — R03.0 ELEVATED BP WITHOUT DIAGNOSIS OF HYPERTENSION: ICD-10-CM

## 2025-05-05 RX ORDER — PREDNISONE 2.5 MG/1
2.5 TABLET ORAL DAILY
Qty: 30 TABLET | Refills: 0 | Status: SHIPPED | OUTPATIENT
Start: 2025-05-05

## 2025-05-06 RX ORDER — AMLODIPINE BESYLATE 5 MG/1
5 TABLET ORAL DAILY
Qty: 30 TABLET | Refills: 0 | Status: SHIPPED | OUTPATIENT
Start: 2025-05-06

## 2025-05-06 NOTE — TELEPHONE ENCOUNTER
Patient needs an appointment. Please contact the patient to schedule an appointment. Last office visit: 05/15/24  Patient needs a one year appointment.

## 2025-05-07 ENCOUNTER — HOSPITAL ENCOUNTER (OUTPATIENT)
Dept: INFUSION CENTER | Facility: CLINIC | Age: 65
Discharge: HOME | End: 2025-05-07
Attending: INTERNAL MEDICINE
Payer: COMMERCIAL

## 2025-05-07 VITALS
RESPIRATION RATE: 18 BRPM | BODY MASS INDEX: 19.07 KG/M2 | SYSTOLIC BLOOD PRESSURE: 119 MMHG | TEMPERATURE: 97.9 F | HEART RATE: 77 BPM | DIASTOLIC BLOOD PRESSURE: 79 MMHG | OXYGEN SATURATION: 98 % | WEIGHT: 96 LBS

## 2025-05-07 DIAGNOSIS — M05.79 RHEUMATOID ARTHRITIS INVOLVING MULTIPLE SITES WITH POSITIVE RHEUMATOID FACTOR (HCC): Primary | ICD-10-CM

## 2025-05-07 PROCEDURE — 96413 CHEMO IV INFUSION 1 HR: CPT

## 2025-05-07 RX ORDER — SODIUM CHLORIDE 9 MG/ML
20 INJECTION, SOLUTION INTRAVENOUS ONCE
OUTPATIENT
Start: 2025-06-04

## 2025-05-07 RX ORDER — SODIUM CHLORIDE 9 MG/ML
20 INJECTION, SOLUTION INTRAVENOUS ONCE
Status: COMPLETED | OUTPATIENT
Start: 2025-05-07 | End: 2025-05-07

## 2025-05-07 RX ADMIN — TOCILIZUMAB 360 MG: 20 INJECTION, SOLUTION, CONCENTRATE INTRAVENOUS at 15:55

## 2025-05-07 RX ADMIN — SODIUM CHLORIDE 20 ML/HR: 9 INJECTION, SOLUTION INTRAVENOUS at 15:14

## 2025-05-07 NOTE — PROGRESS NOTES
Pt at [facility] for Actemra infusion. Pt denies any s/s of infection or antibiotic use. Pt has no further questions at this time. Call bell within reach.   
Pt tolerated tx well with no complaints at this time. Next appt 6/4 at 1500 at [facility]. Declined AVS.   
none

## 2025-05-15 DIAGNOSIS — Z87.09 HISTORY OF ASTHMA: ICD-10-CM

## 2025-05-15 RX ORDER — FEXOFENADINE HCL 180 MG/1
180 TABLET ORAL DAILY PRN
Qty: 90 TABLET | Refills: 1 | Status: SHIPPED | OUTPATIENT
Start: 2025-05-15

## 2025-05-15 NOTE — TELEPHONE ENCOUNTER
Reason for call:   [x] Refill   [] Prior Auth  [] Other:     Office:   [x] PCP/Provider - Idania  [] Specialty/Provider -     Medication: Fexofenadine 180mg    Dose/Frequency: 1 tab daily     Quantity: 90    Pharmacy: [facility] - [address] [phone]     Mail Away Pharmacy   Does the patient have enough for 10 days?   [x] Yes   [] No - Send as HP to POD

## 2025-05-19 DIAGNOSIS — E78.00 HYPERCHOLESTEROLEMIA: ICD-10-CM

## 2025-05-19 NOTE — TELEPHONE ENCOUNTER
Reason for call:   [x] Refill   [] Prior Auth  [] Other: Inquiring a 90 day supply. Appointment Upcoming in September.     Office:   [] PCP/Provider -   [x] Specialty/Provider - Luis Amaya DO / CARDIO ASSTROY ANDRE     Medication: atorvastatin (LIPITOR) 40 mg tablet / Take 1 tablet (40 mg total) by mouth daily    Pharmacy: [facility]/pharmacy #[id number] - [place], [place] - [facility]   Does the patient have enough for 3 days?   [] Yes   [x] No - Send as HP to POD

## 2025-05-20 RX ORDER — ATORVASTATIN CALCIUM 40 MG/1
40 TABLET, FILM COATED ORAL DAILY
Qty: 90 TABLET | Refills: 0 | Status: SHIPPED | OUTPATIENT
Start: 2025-05-20

## 2025-05-21 ENCOUNTER — PROBLEM (OUTPATIENT)
Dept: URBAN - METROPOLITAN AREA CLINIC 6 | Facility: CLINIC | Age: 65
End: 2025-05-21

## 2025-05-21 DIAGNOSIS — S05.01XD: ICD-10-CM

## 2025-05-21 DIAGNOSIS — H40.1113: ICD-10-CM

## 2025-05-21 PROCEDURE — 92012 INTRM OPH EXAM EST PATIENT: CPT

## 2025-05-21 ASSESSMENT — VISUAL ACUITY: OS_CC: 20/30

## 2025-05-21 ASSESSMENT — TONOMETRY
OS_IOP_MMHG: 15
OD_IOP_MMHG: 8

## 2025-05-22 ENCOUNTER — FOLLOW UP (OUTPATIENT)
Dept: URBAN - METROPOLITAN AREA CLINIC 6 | Facility: CLINIC | Age: 65
End: 2025-05-22

## 2025-05-22 DIAGNOSIS — S05.01XD: ICD-10-CM

## 2025-05-22 DIAGNOSIS — H04.121: ICD-10-CM

## 2025-05-22 PROCEDURE — 92012 INTRM OPH EXAM EST PATIENT: CPT

## 2025-05-22 ASSESSMENT — TONOMETRY
OD_IOP_MMHG: 4
OS_IOP_MMHG: 10

## 2025-05-22 ASSESSMENT — VISUAL ACUITY: OS_CC: 20/40

## 2025-06-01 DIAGNOSIS — M05.79 RHEUMATOID ARTHRITIS INVOLVING MULTIPLE SITES WITH POSITIVE RHEUMATOID FACTOR (HCC): ICD-10-CM

## 2025-06-01 PROBLEM — Z79.52 CURRENT CHRONIC USE OF SYSTEMIC STEROIDS: Status: ACTIVE | Noted: 2025-06-01

## 2025-06-01 PROBLEM — M05.9 SEROPOSITIVE RHEUMATOID ARTHRITIS (HCC): Status: ACTIVE | Noted: 2025-06-01

## 2025-06-01 PROBLEM — Z79.1 ENCOUNTER FOR LONG-TERM (CURRENT) USE OF NSAIDS: Status: ACTIVE | Noted: 2025-06-01

## 2025-06-01 PROBLEM — Z79.60 LONG-TERM USE OF IMMUNOSUPPRESSANT MEDICATION: Status: ACTIVE | Noted: 2025-06-01

## 2025-06-01 PROBLEM — M15.0 PRIMARY GENERALIZED (OSTEO)ARTHRITIS: Status: ACTIVE | Noted: 2025-06-01

## 2025-06-02 ENCOUNTER — OFFICE VISIT (OUTPATIENT)
Dept: RHEUMATOLOGY | Facility: CLINIC | Age: 65
End: 2025-06-02
Payer: COMMERCIAL

## 2025-06-02 VITALS
SYSTOLIC BLOOD PRESSURE: 132 MMHG | DIASTOLIC BLOOD PRESSURE: 78 MMHG | HEART RATE: 51 BPM | BODY MASS INDEX: 19.24 KG/M2 | OXYGEN SATURATION: 100 % | WEIGHT: 98 LBS | HEIGHT: 60 IN

## 2025-06-02 DIAGNOSIS — M15.0 PRIMARY GENERALIZED (OSTEO)ARTHRITIS: ICD-10-CM

## 2025-06-02 DIAGNOSIS — Z79.1 ENCOUNTER FOR LONG-TERM (CURRENT) USE OF NSAIDS: ICD-10-CM

## 2025-06-02 DIAGNOSIS — M05.9 SEROPOSITIVE RHEUMATOID ARTHRITIS (HCC): Primary | ICD-10-CM

## 2025-06-02 DIAGNOSIS — E55.9 VITAMIN D DEFICIENCY: ICD-10-CM

## 2025-06-02 DIAGNOSIS — Z79.52 CURRENT CHRONIC USE OF SYSTEMIC STEROIDS: ICD-10-CM

## 2025-06-02 DIAGNOSIS — M19.041 ARTHRITIS OF FINGER OF RIGHT HAND: ICD-10-CM

## 2025-06-02 DIAGNOSIS — M81.0 AGE-RELATED OSTEOPOROSIS WITHOUT CURRENT PATHOLOGICAL FRACTURE: ICD-10-CM

## 2025-06-02 DIAGNOSIS — Z79.60 LONG-TERM USE OF IMMUNOSUPPRESSANT MEDICATION: ICD-10-CM

## 2025-06-02 LAB
ALBUMIN SERPL-MCNC: 4.2 G/DL (ref 3.9–4.9)
ALP SERPL-CCNC: 60 IU/L (ref 44–121)
ALT SERPL-CCNC: 11 IU/L (ref 0–32)
AST SERPL-CCNC: 16 IU/L (ref 0–40)
BASOPHILS # BLD AUTO: 0 X10E3/UL (ref 0–0.2)
BASOPHILS NFR BLD AUTO: 0 %
BILIRUB SERPL-MCNC: 0.3 MG/DL (ref 0–1.2)
BUN SERPL-MCNC: 13 MG/DL (ref 8–27)
BUN/CREAT SERPL: 18 (ref 12–28)
CALCIUM SERPL-MCNC: 9.1 MG/DL (ref 8.7–10.3)
CHLORIDE SERPL-SCNC: 103 MMOL/L (ref 96–106)
CO2 SERPL-SCNC: 23 MMOL/L (ref 20–29)
CREAT SERPL-MCNC: 0.73 MG/DL (ref 0.57–1)
EGFR: 92 ML/MIN/1.73
EOSINOPHIL # BLD AUTO: 0.4 X10E3/UL (ref 0–0.4)
EOSINOPHIL NFR BLD AUTO: 7 %
ERYTHROCYTE [DISTWIDTH] IN BLOOD BY AUTOMATED COUNT: 12.5 % (ref 11.7–15.4)
ERYTHROCYTE [SEDIMENTATION RATE] IN BLOOD BY WESTERGREN METHOD: 7 MM/HR (ref 0–40)
GLOBULIN SER-MCNC: 2.4 G/DL (ref 1.5–4.5)
GLUCOSE SERPL-MCNC: 99 MG/DL (ref 70–99)
HCT VFR BLD AUTO: 38.9 % (ref 34–46.6)
HGB BLD-MCNC: 12.9 G/DL (ref 11.1–15.9)
IMM GRANULOCYTES # BLD: 0 X10E3/UL (ref 0–0.1)
IMM GRANULOCYTES NFR BLD: 0 %
LYMPHOCYTES # BLD AUTO: 1 X10E3/UL (ref 0.7–3.1)
LYMPHOCYTES NFR BLD AUTO: 16 %
MCH RBC QN AUTO: 30.6 PG (ref 26.6–33)
MCHC RBC AUTO-ENTMCNC: 33.2 G/DL (ref 31.5–35.7)
MCV RBC AUTO: 92 FL (ref 79–97)
MONOCYTES # BLD AUTO: 0.6 X10E3/UL (ref 0.1–0.9)
MONOCYTES NFR BLD AUTO: 9 %
NEUTROPHILS # BLD AUTO: 4 X10E3/UL (ref 1.4–7)
NEUTROPHILS NFR BLD AUTO: 68 %
PLATELET # BLD AUTO: 238 X10E3/UL (ref 150–450)
POTASSIUM SERPL-SCNC: 4.1 MMOL/L (ref 3.5–5.2)
PROT SERPL-MCNC: 6.6 G/DL (ref 6–8.5)
RBC # BLD AUTO: 4.22 X10E6/UL (ref 3.77–5.28)
SODIUM SERPL-SCNC: 140 MMOL/L (ref 134–144)
WBC # BLD AUTO: 6 X10E3/UL (ref 3.4–10.8)

## 2025-06-02 PROCEDURE — 99214 OFFICE O/P EST MOD 30 MIN: CPT | Performed by: INTERNAL MEDICINE

## 2025-06-02 NOTE — ASSESSMENT & PLAN NOTE
- History of osteoporosis previously treated with Fosamax and IV Reclast.  She received dose #3 Reclast in January 2022.  She is due for an updated DEXA scan.  We will continue to monitor her bone density every 2 years.  If she does have a decrease in bone density on her upcoming DEXA scan would consider retreatment.  She does take daily calcium and vitamin D supplements as well as additional vitamin D 50,000 IU once weekly.    Orders:    DXA bone density spine hip and pelvis; Future

## 2025-06-02 NOTE — PROGRESS NOTES
Name: Roberta Pierre      : 1960      MRN: [id number]  Encounter Provider: Jen Perry MD  Encounter Date: 2025   Encounter department: [facility] [place]  :  Assessment & Plan  Seropositive rheumatoid arthritis (HCC)  Ms. Pierre is a 64-year-old female with history significant for seropositive rheumatoid arthritis, primary generalized osteoarthritis and postmenopausal osteoporosis who presents for a follow-up.  She is currently on tocilizumab infusions 8 mg/kg every 4 weeks, diclofenac 75 mg twice daily and prednisone 2.5 mg once daily.  She is transferring care from Genna Aponte PA-C.    - Roberta presents today for a follow-up of seropositive rheumatoid arthritis for which she is on tocilizumab infusions 8 mg/kg every 4 weeks, diclofenac 75 mg twice daily and prednisone 2.5 mg once daily.  As she has been clinically stable, I requested she try and decrease the prednisone to 5 tablets/week and decrease the diclofenac to 75 mg once daily.  As she is complaining of pain affecting her right hand fourth PIP joint, since it is monoarticular in nature rather than change her DMARD regimen, I would like her to see hand orthopedics for consideration of an intra-articular cortisone injection.  She will update high risk medication lab monitoring to assess for drug toxicities.    Orders:    C-reactive protein; Future    Sedimentation rate, automated; Future    Ambulatory Referral to Orthopedic Surgery; Future    Long-term use of immunosuppressant medication    Orders:    CBC and differential; Future    Comprehensive metabolic panel; Future    Current chronic use of systemic steroids         Encounter for long-term (current) use of NSAIDs         Primary generalized (osteo)arthritis         Age-related osteoporosis without current pathological fracture  - History of osteoporosis previously treated with Fosamax and IV Reclast.  She received dose #3 Reclast in 2022.  She is due  for an updated DEXA scan.  We will continue to monitor her bone density every 2 years.  If she does have a decrease in bone density on her upcoming DEXA scan would consider retreatment.  She does take daily calcium and vitamin D supplements as well as additional vitamin D 50,000 IU once weekly.    Orders:    DXA bone density spine hip and pelvis; Future    Vitamin D deficiency    Orders:    Vitamin D 25 hydroxy; Future    Arthritis of finger of right hand    Orders:    Ambulatory Referral to Orthopedic Surgery; Future      Patient's rheumatologic disease(s) threaten long-term function if not appropriately managed.      History of Present Illness   HPI    Ms. Pierre is a 64-year-old female with history significant for seropositive rheumatoid arthritis, primary generalized osteoarthritis and postmenopausal osteoporosis who presents for a follow-up.  She is currently on tocilizumab infusions 8 mg/kg every 4 weeks, diclofenac 75 mg twice daily and prednisone 2.5 mg once daily.  She is transferring care from Genna Aponte PA-C.    Patient reports on her current regimen she has been doing very well and denies any concerning joint pains, swelling or stiffness, except for affecting her right hand fourth PIP joint over the past month.  Her symptoms have not progressed since its onset.  She has been tolerating her medications well without any concerns for side effects or infections.  Of note she mentions trying to taper off the prednisone in the past resulted in a flareup of bronchitis, so the prednisone was maintained at a low dose.    Of note she has previously been on methotrexate, leflunomide, infliximab and hydroxychloroquine which was discontinued in 2/2023 due to changes seen by her ophthalmologist.          Review of Systems  Constitutional: Negative for weight change, fevers, chills, night sweats, fatigue.  ENT/Mouth: Negative for hearing changes, ear pain, nasal congestion, sinus pain, hoarseness, sore throat,  rhinorrhea, swallowing difficulty.   Eyes: Negative for pain, redness, discharge, vision changes.   Cardiovascular: Negative for chest pain, SOB, palpitations.   Respiratory: Negative for cough, sputum, wheezing, dyspnea.   Gastrointestinal: Negative for nausea, vomiting, diarrhea, constipation, pain, heartburn.  Genitourinary: Negative for dysuria, urinary frequency, hematuria.   Musculoskeletal: As per HPI.  Skin: Negative for skin rash, color changes.   Psych: Negative for anxiety, depression.   Heme/Lymph: Negative for easy bruising, bleeding, lymphadenopathy.      Past Medical History   Past Medical History[1]  Past Surgical History[2]  Family History[3]   reports that she has never smoked. She has never used smokeless tobacco. She reports that she does not currently use alcohol. She reports that she does not use drugs.  Current Outpatient Medications   Medication Instructions    acetaZOLAMIDE (DIAMOX) 250 mg, 4 times daily    Actemra 80 mg, Intravenous, Every 28 days, Infuse 8 mg/kg (402.8 mg) IV every 4 weeks; use in combination with 200 mg vial    albuterol (ProAir HFA) 90 mcg/act inhaler 2 puffs, Inhalation, Every 6 hours PRN    amLODIPine (NORVASC) 5 mg, Oral, Daily    atorvastatin (LIPITOR) 40 mg, Oral, Daily    Austedo 9 MG TABS TAKE 1 TABLET TWO TIMES A DAY    brimonidine-timolol (COMBIGAN) 0.2-0.5 %     buPROPion (WELLBUTRIN XL) 300 mg, Oral, Every morning    Cholecalciferol (VITAMIN D3) 2000 units CHEW Daily    diclofenac (VOLTAREN) 75 mg, 2 times daily    Docusate Calcium (STOOL SOFTENER PO) 100 mg, Daily    ergocalciferol (VITAMIN D2) 50,000 units TAKE 1 CAPSULE BY MOUTH ONCE A WEEK FOR 8 DOSES ONCE COMPLETED START TAKING OTC D3 2000 UNITS DAILY    erythromycin (ILOTYCIN) ophthalmic ointment 0.5 inches, Once    escitalopram (LEXAPRO) 5 mg, Oral, Daily, Then start zoloft after    famciclovir (FAMVIR) 250 mg, Oral, Daily    fexofenadine (ALLEGRA) 180 mg, Oral, Daily PRN    folic acid (FOLVITE) 1 mg,  "Daily    latanoprost (XALATAN) 0.005 % ophthalmic solution     levothyroxine 112 mcg, Oral, Daily (early morning)    levothyroxine 125 mcg, Oral, Daily    loteprednol etabonate (LOTEMAX) 0.5 % ophthalmic suspension     ofloxacin (OCUFLOX) 0.3 % ophthalmic solution 1 drop, 4 times daily    omeprazole (PRILOSEC) 20 mg, Oral, Daily    Polyethyl Glycol-Propyl Glycol (SYSTANE OP)     prednisoLONE acetate (PRED FORTE) 1 % ophthalmic suspension 1 drop, 4 times daily    predniSONE 2.5 mg, Oral, Daily    Rhopressa 0.02 % SOLN     sertraline (ZOLOFT) 25 mg, Oral, Daily    tocilizumab (Actemra) 200 mg/10 mL Infuse 8 mg/kg (402.8 mg) IV every 4 weeks;  Use in combination with 80 mg vials   Allergies[4]   Medications Ordered Prior to Encounter[5]   Social History[6]     Objective   /78 (BP Location: Right arm, Patient Position: Sitting, Cuff Size: Adult)   Pulse (!) 51   Ht 4' 11.5\" (1.511 m)   Wt 44.5 kg (98 lb)   SpO2 100%   BMI 19.46 kg/m²      Physical Exam  General: Well appearing, well nourished, in no distress. Oriented x 3, normal mood and affect.  Ambulating without difficulty.  Skin: Good turgor, no rash, unusual bruising or prominent lesions.  Hair: Normal texture and distribution.  HEENT:  Head: Normocephalic, atraumatic.  Eyes: Conjunctiva clear, sclera non-icteric, EOM intact.  Nose: No external lesions.  Neck: Supple.  Extremities: No amputations or deformities, cyanosis, edema.  Musculoskeletal:   There is no peripheral joint soft tissue swelling or tenderness noted, except for mild fullness at the right hand 4th PIP joint.  Neurologic: Alert and oriented. No focal neurological deficits appreciated.   Psychiatric: Normal mood and affect.              [1]   Past Medical History:  Diagnosis Date    Allergic rhinitis     Anxiety     Colitis     Colon polyps     + history for colon polyps    Constipation 12/09/2020    Depression     Disease of thyroid gland     Encounter for screening mammogram for " malignant neoplasm of breast 06/13/2021    Full dentures     GERD (gastroesophageal reflux disease)     Glaucoma     Glaucoma     Herpes gingivostomatitis     Herpes simplex infection     Guilford's disease (HCC)     Hyperlipidemia     Hypertension     new onset 05/2019    Multiple benign polyps of large intestine     Osteoarthritis     Osteopenia     Osteoporosis     RA (rheumatoid arthritis) (HCC)     Sjogren's disease (HCC)    [2]   Past Surgical History:  Procedure Laterality Date    COLONOSCOPY      DENTAL SURGERY      upper and lower extractions    ESOPHAGOGASTRODUODENOSCOPY      DIAGNOSTIC    GLAUCOMA SURGERY Right 02/15/2024    INCONTINENCE SURGERY      LAPAROSCOPIC SLING OPERATION FOR STRESS INCONTINENCE     UPPER GASTROINTESTINAL ENDOSCOPY     [3]   Family History  Problem Relation Name Age of Onset    Anne Marie's disease Father      Alcohol abuse Father      Coronary artery disease Family      Hypertension Mother      Diabetes Mother      Meniere's disease Brother      Lupus Daughter      Immunodeficiency Son      Diabetes Paternal Grandmother      Heart disease Paternal Grandmother      No Known Problems Sister      No Known Problems Sister      No Known Problems Sister      Meniere's disease Brother      No Known Problems Maternal Aunt      No Known Problems Maternal Aunt     [4]   Allergies  Allergen Reactions    Gold Itching    Sulfa Antibiotics Hives and Rash    Valacyclovir Palpitations   [5]   Current Outpatient Medications on File Prior to Visit   Medication Sig Dispense Refill    acetaZOLAMIDE (DIAMOX) 250 mg tablet Take 250 mg by mouth in the morning and 250 mg at noon and 250 mg in the evening and 250 mg before bedtime.      albuterol (ProAir HFA) 90 mcg/act inhaler Inhale 2 puffs every 6 (six) hours as needed for wheezing 8.5 g 5    amLODIPine (NORVASC) 5 mg tablet TAKE 1 TABLET (5 MG TOTAL) BY MOUTH DAILY. 30 tablet 0    atorvastatin (LIPITOR) 40 mg tablet Take 1 tablet (40 mg total) by  mouth daily 90 tablet 0    Austedo 9 MG TABS TAKE 1 TABLET TWO TIMES A  tablet 1    brimonidine-timolol (COMBIGAN) 0.2-0.5 %       buPROPion (WELLBUTRIN XL) 300 mg 24 hr tablet Take 1 tablet (300 mg total) by mouth every morning 90 tablet 1    Cholecalciferol (VITAMIN D3) 2000 units CHEW in the morning.      diclofenac (VOLTAREN) 75 mg EC tablet TAKE 1 TABLET TWICE A  tablet 3    Docusate Calcium (STOOL SOFTENER PO) Take 100 mg by mouth in the morning.      ergocalciferol (VITAMIN D2) 50,000 units TAKE 1 CAPSULE BY MOUTH ONCE A WEEK FOR 8 DOSES ONCE COMPLETED START TAKING OTC D3 2000 UNITS DAILY 12 capsule 1    erythromycin (ILOTYCIN) ophthalmic ointment Administer 0.5 inches to both eyes once      famciclovir (FAMVIR) 250 MG tablet Take 1 tablet (250 mg total) by mouth daily 90 tablet 1    fexofenadine (ALLEGRA) 180 MG tablet Take 1 tablet (180 mg total) by mouth daily as needed (allergy) 90 tablet 1    folic acid (FOLVITE) 1 mg tablet Take 1 mg by mouth in the morning.      latanoprost (XALATAN) 0.005 % ophthalmic solution       levothyroxine 112 mcg tablet Take 1 tablet (112 mcg total) by mouth daily in the early morning 100 tablet 3    loteprednol etabonate (LOTEMAX) 0.5 % ophthalmic suspension       ofloxacin (OCUFLOX) 0.3 % ophthalmic solution Administer 1 drop to the right eye in the morning and 1 drop at noon and 1 drop in the evening and 1 drop before bedtime.      omeprazole (PriLOSEC) 20 mg delayed release capsule Take 1 capsule (20 mg total) by mouth daily 90 capsule 3    Polyethyl Glycol-Propyl Glycol (SYSTANE OP)       prednisoLONE acetate (PRED FORTE) 1 % ophthalmic suspension Administer 1 drop to the right eye in the morning and 1 drop at noon and 1 drop in the evening and 1 drop before bedtime.      predniSONE 2.5 mg tablet TAKE 1 TABLET BY MOUTH EVERY DAY 30 tablet 0    Rhopressa 0.02 % SOLN       sertraline (ZOLOFT) 25 mg tablet Take 1 tablet (25 mg total) by mouth daily 30 tablet 5     tocilizumab (Actemra) 200 mg/10 mL Infuse 8 mg/kg (402.8 mg) IV every 4 weeks;  Use in combination with 80 mg vials 10 mL 6    tocilizumab (Actemra) 80 mg/4 mL Inject 4 mL (80 mg total) into a catheter in a vein every 28 days Infuse 8 mg/kg (402.8 mg) IV every 4 weeks; use in combination with 200 mg vial 12 mL 6    escitalopram (LEXAPRO) 5 mg tablet Take 1 tablet (5 mg total) by mouth daily for 7 days Then start zoloft after (Patient not taking: Reported on 4/9/2025) 7 tablet 0    levothyroxine 125 mcg tablet TAKE 1 TABLET BY MOUTH EVERY DAY (Patient not taking: Reported on 2/6/2025) 30 tablet 5     No current facility-administered medications on file prior to visit.   [6]   Social History  Tobacco Use    Smoking status: Never    Smokeless tobacco: Never   Vaping Use    Vaping status: Never Used   Substance and Sexual Activity    Alcohol use: Not Currently     Comment: SOCIAL     Drug use: Never

## 2025-06-02 NOTE — PATIENT INSTRUCTIONS
1) Try decreasing the Voltaren to only 1 tablet once daily.  2) Take 5 days of prednisone per week, and try to skip 2 days per week.

## 2025-06-02 NOTE — ASSESSMENT & PLAN NOTE
Ms. Pierre is a 64-year-old female with history significant for seropositive rheumatoid arthritis, primary generalized osteoarthritis and postmenopausal osteoporosis who presents for a follow-up.  She is currently on tocilizumab infusions 8 mg/kg every 4 weeks, diclofenac 75 mg twice daily and prednisone 2.5 mg once daily.  She is transferring care from [facility] Samuel presents today for a follow-up of seropositive rheumatoid arthritis for which she is on tocilizumab infusions 8 mg/kg every 4 weeks, diclofenac 75 mg twice daily and prednisone 2.5 mg once daily.  As she has been clinically stable, I requested she try and decrease the prednisone to 5 tablets/week and decrease the diclofenac to 75 mg once daily.  As she is complaining of pain affecting her right hand fourth PIP joint, since it is monoarticular in nature rather than change her DMARD regimen, I would like her to see hand orthopedics for consideration of an intra-articular cortisone injection.  She will update high risk medication lab monitoring to assess for drug toxicities.    Orders:    C-reactive protein; Future    Sedimentation rate, automated; Future    Ambulatory Referral to Orthopedic Surgery; Future

## 2025-06-03 ENCOUNTER — RESULTS FOLLOW-UP (OUTPATIENT)
Dept: RHEUMATOLOGY | Facility: CLINIC | Age: 65
End: 2025-06-03

## 2025-06-03 LAB
25(OH)D3+25(OH)D2 SERPL-MCNC: 17.8 NG/ML (ref 30–100)
CRP SERPL-MCNC: <1 MG/L (ref 0–10)

## 2025-06-03 RX ORDER — PREDNISONE 2.5 MG/1
2.5 TABLET ORAL DAILY
Qty: 90 TABLET | Refills: 1 | Status: SHIPPED | OUTPATIENT
Start: 2025-06-03 | End: 2025-11-30

## 2025-06-04 ENCOUNTER — HOSPITAL ENCOUNTER (OUTPATIENT)
Dept: INFUSION CENTER | Facility: CLINIC | Age: 65
Discharge: HOME | End: 2025-06-04
Attending: INTERNAL MEDICINE
Payer: COMMERCIAL

## 2025-06-04 VITALS
HEIGHT: 60 IN | DIASTOLIC BLOOD PRESSURE: 84 MMHG | BODY MASS INDEX: 18.94 KG/M2 | TEMPERATURE: 97.8 F | RESPIRATION RATE: 18 BRPM | SYSTOLIC BLOOD PRESSURE: 124 MMHG | OXYGEN SATURATION: 99 % | WEIGHT: 96.5 LBS | HEART RATE: 83 BPM

## 2025-06-04 DIAGNOSIS — M05.79 RHEUMATOID ARTHRITIS INVOLVING MULTIPLE SITES WITH POSITIVE RHEUMATOID FACTOR (HCC): Primary | ICD-10-CM

## 2025-06-04 PROCEDURE — 96413 CHEMO IV INFUSION 1 HR: CPT

## 2025-06-04 RX ORDER — SODIUM CHLORIDE 9 MG/ML
20 INJECTION, SOLUTION INTRAVENOUS ONCE
OUTPATIENT
Start: 2025-07-02

## 2025-06-04 RX ORDER — SODIUM CHLORIDE 9 MG/ML
20 INJECTION, SOLUTION INTRAVENOUS ONCE
Status: COMPLETED | OUTPATIENT
Start: 2025-06-04 | End: 2025-06-04

## 2025-06-04 RX ADMIN — TOCILIZUMAB 360 MG: 20 INJECTION, SOLUTION, CONCENTRATE INTRAVENOUS at 15:25

## 2025-06-04 RX ADMIN — SODIUM CHLORIDE 20 ML/HR: 0.9 INJECTION, SOLUTION INTRAVENOUS at 14:53

## 2025-06-04 NOTE — PROGRESS NOTES
Patient tolerated treatment without incident. Peripheral IV removed. Next appointment confirmed for 7/2/2025 [id number]. AVS offered and declined.

## 2025-06-07 DIAGNOSIS — E78.00 HYPERCHOLESTEROLEMIA: ICD-10-CM

## 2025-06-07 DIAGNOSIS — R03.0 ELEVATED BP WITHOUT DIAGNOSIS OF HYPERTENSION: ICD-10-CM

## 2025-06-09 RX ORDER — AMLODIPINE BESYLATE 5 MG/1
5 TABLET ORAL DAILY
Qty: 30 TABLET | Refills: 0 | Status: SHIPPED | OUTPATIENT
Start: 2025-06-09

## 2025-06-27 DIAGNOSIS — F32.A DEPRESSION, UNSPECIFIED DEPRESSION TYPE: ICD-10-CM

## 2025-06-27 RX ORDER — BUPROPION HYDROCHLORIDE 300 MG/1
300 TABLET ORAL EVERY MORNING
Qty: 90 TABLET | Refills: 1 | Status: SHIPPED | OUTPATIENT
Start: 2025-06-27

## 2025-07-02 ENCOUNTER — HOSPITAL ENCOUNTER (OUTPATIENT)
Dept: INFUSION CENTER | Facility: CLINIC | Age: 65
Discharge: HOME | End: 2025-07-02
Attending: INTERNAL MEDICINE
Payer: COMMERCIAL

## 2025-07-02 VITALS
HEART RATE: 74 BPM | RESPIRATION RATE: 18 BRPM | BODY MASS INDEX: 19.46 KG/M2 | OXYGEN SATURATION: 99 % | SYSTOLIC BLOOD PRESSURE: 124 MMHG | TEMPERATURE: 97.8 F | WEIGHT: 98 LBS | DIASTOLIC BLOOD PRESSURE: 80 MMHG

## 2025-07-02 DIAGNOSIS — M05.79 RHEUMATOID ARTHRITIS INVOLVING MULTIPLE SITES WITH POSITIVE RHEUMATOID FACTOR (HCC): Primary | ICD-10-CM

## 2025-07-02 PROCEDURE — 96413 CHEMO IV INFUSION 1 HR: CPT

## 2025-07-02 RX ORDER — SODIUM CHLORIDE 9 MG/ML
20 INJECTION, SOLUTION INTRAVENOUS ONCE
Status: COMPLETED | OUTPATIENT
Start: 2025-07-02 | End: 2025-07-02

## 2025-07-02 RX ORDER — SODIUM CHLORIDE 9 MG/ML
20 INJECTION, SOLUTION INTRAVENOUS ONCE
OUTPATIENT
Start: 2025-07-30

## 2025-07-02 RX ADMIN — SODIUM CHLORIDE 20 ML/HR: 0.9 INJECTION, SOLUTION INTRAVENOUS at 15:10

## 2025-07-02 RX ADMIN — TOCILIZUMAB 360 MG: 20 INJECTION, SOLUTION, CONCENTRATE INTRAVENOUS at 16:05

## 2025-07-02 NOTE — PROGRESS NOTES
Pt tolerated treatment well without any adverse reactions. Aware of next appointment 7/30 @ 330pm. Appointment card provided. AVS declined.

## 2025-07-02 NOTE — PROGRESS NOTES
Patient to Infusion Center for Actemra: Offers no complaints at present time: Lab work ( 06/02/25 ) reviewed: Within parameters to treat: Right FA PIV initiated without incident

## 2025-07-05 DIAGNOSIS — R03.0 ELEVATED BP WITHOUT DIAGNOSIS OF HYPERTENSION: ICD-10-CM

## 2025-07-05 DIAGNOSIS — E78.00 HYPERCHOLESTEROLEMIA: ICD-10-CM

## 2025-07-07 RX ORDER — AMLODIPINE BESYLATE 5 MG/1
5 TABLET ORAL DAILY
Qty: 30 TABLET | Refills: 0 | Status: SHIPPED | OUTPATIENT
Start: 2025-07-07

## 2025-07-08 DIAGNOSIS — E03.9 HYPOTHYROIDISM, UNSPECIFIED TYPE: ICD-10-CM

## 2025-07-09 DIAGNOSIS — F41.8 DEPRESSION WITH ANXIETY: ICD-10-CM

## 2025-07-09 RX ORDER — LEVOTHYROXINE SODIUM 112 UG/1
112 TABLET ORAL EVERY MORNING
Qty: 30 TABLET | Refills: 5 | Status: SHIPPED | OUTPATIENT
Start: 2025-07-09

## 2025-07-09 RX ORDER — SERTRALINE HYDROCHLORIDE 25 MG/1
25 TABLET, FILM COATED ORAL DAILY
Qty: 90 TABLET | Refills: 1 | Status: SHIPPED | OUTPATIENT
Start: 2025-07-09 | End: 2026-01-05

## 2025-07-09 NOTE — TELEPHONE ENCOUNTER
Patient called to request a refill for their Levothyroxine 112 mcg advised a refill was requested on 07/08/25 and is pending approval. Patient verbalized understanding and is in agreement.     Does the patient have enough for 3 days?   [] Yes   [x] No - Send as HP to POD

## 2025-07-09 NOTE — TELEPHONE ENCOUNTER
Pharmacy change to mail order needs 90 day supply   Patient stated working well   Reason for call:   [x] Refill   [] Prior Auth  [] Other:     Office:   [x] PCP/Provider - Dory Moeller  [] Specialty/Provider -     Medication: Sertraline    Dose/Frequency: 25 mg Daily     Quantity: 90    Pharmacy: [facility]   Does the patient have enough for 3 days?   [] Yes   [] No - Send as HP to POD    Mail Away Pharmacy   Does the patient have enough for 10 days?   [] Yes   [x] No - Send as HP to POD

## 2025-07-30 ENCOUNTER — HOSPITAL ENCOUNTER (OUTPATIENT)
Dept: INFUSION CENTER | Facility: CLINIC | Age: 65
Discharge: HOME | End: 2025-07-30
Attending: INTERNAL MEDICINE
Payer: COMMERCIAL

## 2025-07-30 VITALS
TEMPERATURE: 97.4 F | HEART RATE: 71 BPM | SYSTOLIC BLOOD PRESSURE: 127 MMHG | BODY MASS INDEX: 19.36 KG/M2 | DIASTOLIC BLOOD PRESSURE: 85 MMHG | OXYGEN SATURATION: 97 % | WEIGHT: 97.5 LBS

## 2025-07-30 DIAGNOSIS — M05.79 RHEUMATOID ARTHRITIS INVOLVING MULTIPLE SITES WITH POSITIVE RHEUMATOID FACTOR (HCC): Primary | ICD-10-CM

## 2025-07-30 PROCEDURE — 96413 CHEMO IV INFUSION 1 HR: CPT

## 2025-07-30 RX ORDER — SODIUM CHLORIDE 9 MG/ML
20 INJECTION, SOLUTION INTRAVENOUS ONCE
Status: COMPLETED | OUTPATIENT
Start: 2025-07-30 | End: 2025-07-30

## 2025-07-30 RX ORDER — SODIUM CHLORIDE 9 MG/ML
20 INJECTION, SOLUTION INTRAVENOUS ONCE
OUTPATIENT
Start: 2025-08-27

## 2025-07-30 RX ADMIN — SODIUM CHLORIDE 20 ML/HR: 0.9 INJECTION, SOLUTION INTRAVENOUS at 15:20

## 2025-07-30 RX ADMIN — TOCILIZUMAB 360 MG: 20 INJECTION, SOLUTION, CONCENTRATE INTRAVENOUS at 16:05

## 2025-08-01 DIAGNOSIS — R03.0 ELEVATED BP WITHOUT DIAGNOSIS OF HYPERTENSION: ICD-10-CM

## 2025-08-01 DIAGNOSIS — E78.00 HYPERCHOLESTEROLEMIA: ICD-10-CM

## 2025-08-01 RX ORDER — AMLODIPINE BESYLATE 5 MG/1
5 TABLET ORAL DAILY
Qty: 90 TABLET | Refills: 1 | Status: SHIPPED | OUTPATIENT
Start: 2025-08-01

## 2025-08-05 DIAGNOSIS — E78.00 HYPERCHOLESTEROLEMIA: ICD-10-CM

## 2025-08-06 DIAGNOSIS — K21.9 GASTROESOPHAGEAL REFLUX DISEASE, UNSPECIFIED WHETHER ESOPHAGITIS PRESENT: ICD-10-CM

## 2025-08-07 ENCOUNTER — OFFICE VISIT (OUTPATIENT)
Dept: FAMILY MEDICINE CLINIC | Facility: CLINIC | Age: 65
End: 2025-08-07
Payer: COMMERCIAL

## 2025-08-07 RX ORDER — OMEPRAZOLE 20 MG/1
20 CAPSULE, DELAYED RELEASE ORAL DAILY
Qty: 90 CAPSULE | Refills: 1 | Status: SHIPPED | OUTPATIENT
Start: 2025-08-07

## 2025-08-07 RX ORDER — ATORVASTATIN CALCIUM 40 MG/1
40 TABLET, FILM COATED ORAL DAILY
Qty: 30 TABLET | Refills: 0 | Status: SHIPPED | OUTPATIENT
Start: 2025-08-07

## 2025-09-03 ENCOUNTER — FOLLOW UP (OUTPATIENT)
Dept: URBAN - METROPOLITAN AREA CLINIC 6 | Facility: CLINIC | Age: 65
End: 2025-09-03

## 2025-09-03 DIAGNOSIS — H04.121: ICD-10-CM

## 2025-09-03 DIAGNOSIS — H25.13: ICD-10-CM

## 2025-09-03 DIAGNOSIS — H40.1113: ICD-10-CM

## 2025-09-03 DIAGNOSIS — H40.1122: ICD-10-CM

## 2025-09-03 PROCEDURE — 92012 INTRM OPH EXAM EST PATIENT: CPT

## 2025-09-03 PROCEDURE — 92250 FUNDUS PHOTOGRAPHY W/I&R: CPT

## 2025-09-03 ASSESSMENT — TONOMETRY
OS_IOP_MMHG: 18
OD_IOP_MMHG: 6
OD_IOP_MMHG: 8
OS_IOP_MMHG: 16

## 2025-09-03 ASSESSMENT — VISUAL ACUITY: OS_CC: 20/30-2

## (undated) RX ORDER — OFLOXACIN 3 MG/ML
1 SOLUTION OPHTHALMIC
Start: 2024-02-16

## (undated) RX ORDER — ERYTHROMYCIN 5 MG/G
1/4 OINTMENT OPHTHALMIC
Start: 2024-07-31

## (undated) RX ORDER — MOXIFLOXACIN OPHTHALMIC 5 MG/ML
1 SOLUTION/ DROPS OPHTHALMIC
Start: 2025-05-21

## (undated) RX ORDER — PREDNISOLONE ACETATE 10 MG/ML
1 SUSPENSION/ DROPS OPHTHALMIC
Start: 2024-02-16

## (undated) RX ORDER — DORZOLAMIDE 20 MG/ML
1 SOLUTION/ DROPS OPHTHALMIC TWICE A DAY
Start: 2024-06-04

## (undated) RX ORDER — BRIMONIDINE TARTRATE, TIMOLOL MALEATE 2; 5 MG/ML; MG/ML: 1 SOLUTION/ DROPS OPHTHALMIC TWICE A DAY

## (undated) RX ORDER — LATANOPROST 50 UG/ML
1 SOLUTION/ DROPS OPHTHALMIC EVERY EVENING
Start: 2024-03-27